# Patient Record
Sex: MALE | Race: WHITE | Employment: OTHER | URBAN - METROPOLITAN AREA
[De-identification: names, ages, dates, MRNs, and addresses within clinical notes are randomized per-mention and may not be internally consistent; named-entity substitution may affect disease eponyms.]

---

## 2017-02-08 ENCOUNTER — APPOINTMENT (OUTPATIENT)
Dept: ULTRASOUND IMAGING | Facility: CLINIC | Age: 82
End: 2017-02-08
Attending: PHYSICIAN ASSISTANT
Payer: MEDICARE

## 2017-02-08 ENCOUNTER — HOSPITAL ENCOUNTER (EMERGENCY)
Facility: CLINIC | Age: 82
Discharge: HOME | End: 2017-02-08
Attending: PHYSICIAN ASSISTANT | Admitting: PHYSICIAN ASSISTANT
Payer: MEDICARE

## 2017-02-08 ENCOUNTER — APPOINTMENT (OUTPATIENT)
Dept: GENERAL RADIOLOGY | Facility: CLINIC | Age: 82
End: 2017-02-08
Attending: PHYSICIAN ASSISTANT
Payer: MEDICARE

## 2017-02-08 VITALS
RESPIRATION RATE: 15 BRPM | TEMPERATURE: 97.6 F | BODY MASS INDEX: 21.66 KG/M2 | HEIGHT: 65 IN | SYSTOLIC BLOOD PRESSURE: 118 MMHG | DIASTOLIC BLOOD PRESSURE: 72 MMHG | WEIGHT: 130 LBS | OXYGEN SATURATION: 99 % | HEART RATE: 142 BPM

## 2017-02-08 DIAGNOSIS — I47.10 PAROXYSMAL SUPRAVENTRICULAR TACHYCARDIA (H): ICD-10-CM

## 2017-02-08 DIAGNOSIS — M25.473 ANKLE EDEMA: ICD-10-CM

## 2017-02-08 DIAGNOSIS — E87.6 HYPOKALEMIA: ICD-10-CM

## 2017-02-08 LAB
ANION GAP SERPL CALCULATED.3IONS-SCNC: 11 MMOL/L (ref 3–14)
BASOPHILS # BLD AUTO: 0 10E9/L (ref 0–0.2)
BASOPHILS NFR BLD AUTO: 0.2 %
BUN SERPL-MCNC: 37 MG/DL (ref 7–30)
CALCIUM SERPL-MCNC: 8.7 MG/DL (ref 8.5–10.1)
CHLORIDE SERPL-SCNC: 105 MMOL/L (ref 94–109)
CO2 SERPL-SCNC: 23 MMOL/L (ref 20–32)
CREAT SERPL-MCNC: 1.52 MG/DL (ref 0.66–1.25)
CRP SERPL-MCNC: 22.4 MG/L (ref 0–8)
DIFFERENTIAL METHOD BLD: ABNORMAL
EOSINOPHIL # BLD AUTO: 0.1 10E9/L (ref 0–0.7)
EOSINOPHIL NFR BLD AUTO: 1.2 %
ERYTHROCYTE [DISTWIDTH] IN BLOOD BY AUTOMATED COUNT: 14.2 % (ref 10–15)
ERYTHROCYTE [SEDIMENTATION RATE] IN BLOOD BY WESTERGREN METHOD: 26 MM/H (ref 0–20)
GFR SERPL CREATININE-BSD FRML MDRD: 43 ML/MIN/1.7M2
GLUCOSE SERPL-MCNC: 122 MG/DL (ref 70–99)
HCT VFR BLD AUTO: 38.6 % (ref 40–53)
HGB BLD-MCNC: 12.5 G/DL (ref 13.3–17.7)
IMM GRANULOCYTES # BLD: 0 10E9/L (ref 0–0.4)
IMM GRANULOCYTES NFR BLD: 0.2 %
INTERPRETATION ECG - MUSE: NORMAL
LYMPHOCYTES # BLD AUTO: 1.2 10E9/L (ref 0.8–5.3)
LYMPHOCYTES NFR BLD AUTO: 13.5 %
MAGNESIUM SERPL-MCNC: 2.2 MG/DL (ref 1.6–2.3)
MCH RBC QN AUTO: 31.4 PG (ref 26.5–33)
MCHC RBC AUTO-ENTMCNC: 32.4 G/DL (ref 31.5–36.5)
MCV RBC AUTO: 97 FL (ref 78–100)
MONOCYTES # BLD AUTO: 0.6 10E9/L (ref 0–1.3)
MONOCYTES NFR BLD AUTO: 6.9 %
NEUTROPHILS # BLD AUTO: 6.6 10E9/L (ref 1.6–8.3)
NEUTROPHILS NFR BLD AUTO: 78 %
NRBC # BLD AUTO: 0 10*3/UL
NRBC BLD AUTO-RTO: 0 /100
NT-PROBNP SERPL-MCNC: 843 PG/ML (ref 0–1800)
PLATELET # BLD AUTO: 217 10E9/L (ref 150–450)
POTASSIUM SERPL-SCNC: 3.1 MMOL/L (ref 3.4–5.3)
RBC # BLD AUTO: 3.98 10E12/L (ref 4.4–5.9)
SODIUM SERPL-SCNC: 139 MMOL/L (ref 133–144)
TROPONIN I SERPL-MCNC: NORMAL UG/L (ref 0–0.04)
WBC # BLD AUTO: 8.5 10E9/L (ref 4–11)

## 2017-02-08 PROCEDURE — 96374 THER/PROPH/DIAG INJ IV PUSH: CPT

## 2017-02-08 PROCEDURE — A9270 NON-COVERED ITEM OR SERVICE: HCPCS | Mod: GY | Performed by: PHYSICIAN ASSISTANT

## 2017-02-08 PROCEDURE — 71020 XR CHEST 2 VW: CPT

## 2017-02-08 PROCEDURE — 85025 COMPLETE CBC W/AUTO DIFF WBC: CPT | Performed by: PHYSICIAN ASSISTANT

## 2017-02-08 PROCEDURE — 25000132 ZZH RX MED GY IP 250 OP 250 PS 637: Mod: GY | Performed by: PHYSICIAN ASSISTANT

## 2017-02-08 PROCEDURE — 99285 EMERGENCY DEPT VISIT HI MDM: CPT | Mod: 25

## 2017-02-08 PROCEDURE — 73610 X-RAY EXAM OF ANKLE: CPT | Mod: RT

## 2017-02-08 PROCEDURE — 80048 BASIC METABOLIC PNL TOTAL CA: CPT | Performed by: PHYSICIAN ASSISTANT

## 2017-02-08 PROCEDURE — 83735 ASSAY OF MAGNESIUM: CPT | Performed by: PHYSICIAN ASSISTANT

## 2017-02-08 PROCEDURE — 92960 CARDIOVERSION ELECTRIC EXT: CPT

## 2017-02-08 PROCEDURE — 40000065 ZZH STATISTIC EKG NON-CHARGEABLE

## 2017-02-08 PROCEDURE — 84484 ASSAY OF TROPONIN QUANT: CPT | Performed by: PHYSICIAN ASSISTANT

## 2017-02-08 PROCEDURE — 83880 ASSAY OF NATRIURETIC PEPTIDE: CPT | Performed by: PHYSICIAN ASSISTANT

## 2017-02-08 PROCEDURE — 85652 RBC SED RATE AUTOMATED: CPT | Performed by: PHYSICIAN ASSISTANT

## 2017-02-08 PROCEDURE — 86140 C-REACTIVE PROTEIN: CPT | Performed by: PHYSICIAN ASSISTANT

## 2017-02-08 PROCEDURE — 25000128 H RX IP 250 OP 636: Performed by: PHYSICIAN ASSISTANT

## 2017-02-08 PROCEDURE — 93971 EXTREMITY STUDY: CPT | Mod: RT

## 2017-02-08 PROCEDURE — 93005 ELECTROCARDIOGRAM TRACING: CPT

## 2017-02-08 RX ORDER — POTASSIUM CHLORIDE 1500 MG/1
20 TABLET, EXTENDED RELEASE ORAL ONCE
Status: COMPLETED | OUTPATIENT
Start: 2017-02-08 | End: 2017-02-08

## 2017-02-08 RX ORDER — ADENOSINE 3 MG/ML
6 INJECTION, SOLUTION INTRAVENOUS ONCE
Status: COMPLETED | OUTPATIENT
Start: 2017-02-08 | End: 2017-02-08

## 2017-02-08 RX ORDER — LIDOCAINE 40 MG/G
CREAM TOPICAL
Status: DISCONTINUED | OUTPATIENT
Start: 2017-02-08 | End: 2017-02-08 | Stop reason: HOSPADM

## 2017-02-08 RX ADMIN — ADENOSINE 6 MG: 3 INJECTION, SOLUTION INTRAVENOUS at 12:39

## 2017-02-08 RX ADMIN — POTASSIUM CHLORIDE 20 MEQ: 1500 TABLET, EXTENDED RELEASE ORAL at 14:31

## 2017-02-08 ASSESSMENT — ENCOUNTER SYMPTOMS
FEVER: 0
SHORTNESS OF BREATH: 0
PALPITATIONS: 1

## 2017-02-08 NOTE — DISCHARGE INSTRUCTIONS
Rest, elevate, wear walking boot for comfort.   Follow up with primary care in 2-3 days for recheck.   Return sooner for worsening swelling, problems breathing, skin redness, or any other new concerns.

## 2017-02-08 NOTE — ED PROVIDER NOTES
I saw this patient in conjunction with Yohana Cabral PA-C      CHIEF COMPLAINT:  Ankle pain and palpitations.      HISTORY OF PRESENT ILLNESS:  Loco Hawkins states he has had months of right lower extremity pain and swelling and was noted to have a racing heart just prior to evaluation.  He states that this has been a longstanding intermittent issue.  He denies chest pain or shortness of breath.      PAST MEDICAL HISTORY:  Reviewed.      PHYSICAL EXAMINATION:   GENERAL:  He is alert, interactive, appears younger than his stated age.   Nurses note and vital signs were reviewed.   CARDIOVASCULAR:  Tachycardic and regular.   MUSCULOSKELETAL:  Demonstrates 1+ edema to the right lower extremity and tenderness over the bilateral malleoli of the right ankle.   SKIN:  Warm and dry, with no skin rash.      RESULTS REVIEWED:  Electrocardiograms x2 were reviewed.  Initial demonstrates SVT.  Following administration of adenosine, resolution of SVT.      EMERGENCY DEPARTMENT COURSE AND MEDICAL DECISION MAKING:  Following presentation, history and physical examination was performed.  The patient was noted to be in SVT with EKG changes, therefore received electrical cardioversion without incident via adenosine, subsequently breaking the SVT and returning into a normal sinus rhythm with a rate in the 70s and 80s.  Subsequently, workup was undertaken on his leg which returned as largely unremarkable.  The patient was placed in a surgical Cam boot and will follow up with Orthopedics.  There are no signs of DVT, septic arthritis, necrotizing soft tissue infection, fracture or more concerning illness.  The patient denies chest pain or shortness of breath and I doubt acute coronary syndrome or pulmonary embolism.  Laboratory workup is largely unremarkable and the EKG changes resolved after rate control was initiated.  The patient will follow up with his primary care provider and cardiologist and will return if new symptoms develop.       IMPRESSION:   1.  Supraventricular tachycardia.   2.  Right lower extremity edema and pain.   3.  Mild hypokalemia.      PLAN:  I agree with the plan of BLAISE Cabral.         AVIVA WHEELER MD             D: 2017 15:36   T: 2017 16:06   MT: GARRETT      Name:     CARLOS POLK   MRN:      [id number]        Account:      [id number]   :      10/31/1921           Visit Date:   2017      Document: [id number]

## 2017-02-08 NOTE — ED AVS SNAPSHOT
Emergency Department    [address] [place] [id number]    Phone:  [phone]    Fax:  [phone]                                       Loco Hawkins   MRN: [id number]    Department:   Emergency Department   Date of Visit:  2/8/2017           After Visit Summary Signature Page     I have received my discharge instructions, and my questions have been answered. I have discussed any challenges I see with this plan with the nurse or doctor.    ..........................................................................................................................................  Patient/Patient Representative Signature      ..........................................................................................................................................  Patient Representative Print Name and Relationship to Patient    ..................................................               ................................................  Date                                            Time    ..........................................................................................................................................  Reviewed by Signature/Title    ...................................................              ..............................................  Date                                                            Time

## 2017-02-08 NOTE — ED PROVIDER NOTES
History     Chief Complaint:  Ankle Pain      HPI   Loco Hawkins is a 89 year old male with a history of paroxysmal SVT who presents with ankle pain. The patient states that he has had intermittent right ankle and foot swelling since last summer. He states that he awoke this morning with severe pain on the medial aspect of his right ankle, and was unable to bear weight on it without pain. He denies any falls or trauma to the ankle. He normally ambulates with a walker. The patient denies calf pain, chest pain, shortness of breath, or fevers. He does report a racing heart beat, and states that he has been medicated for tachycardia in the past, but does not remember the name of this medication. This typically goes away, however today seems to be persistent since he left for the hospital this morning.     Cardiac Risk Factors   Sex: male    Tobacco: Positive - former smoker  Hypertension: Negative  Diabetes: Negative  Hyperlipidemia: Negative  Family History: Negative    PE/DVT Risk Factors   Personal History: Negative  Recent Travel: Positive former smoker  Recent Surgery/Hospitalization: Negative  Tobacco: Negative  Family History: Negative  Hormone Use: Negative   Cancer: Positive - prostate  Trauma: Negative      Allergies:  Penicillins     Medications:  Aspirin ec 81 mg enteric coated tablet   Diltiazem cd coated beads (cardizem cd) 180 mg 24 hour release capsule   Digoxin (aka lanoxin) 125 mcg tablet   Hydrochlorothiazide (aka hydrodiuril) 25 mg tablet   Latanoprost (xalatan) 0.005 % eye drop solution   carbidopa-levodopa (sinemet)  mg tablet   Dorzolamide-timolol (cosopt) 22.3-6.8 mg/ml eye drop solution        Past Medical History:  Primary open angle glaucoma of both eyes, mild stage    Paroxysmal SVT (supraventricular tachycardia) ([facility])   Age-related macular degeneration, dry, both eyes   Pseudophakia  Pain, heel   Parkinson's disease ([facility])   Glaucoma   Senile macular degeneration  Cancer  "[facility]  Personal hx of malignant neoplasm of prostate   Arthritis     Past Surgical history:  Cataract removal    Family History:  Cancer - Mother   Macular degeneration - Sister    Social History:  Marital Status:   Presents to the ED alone  Tobacco Use: Former smoker  Alcohol Use: rarely  PCP: Evan Mena      Review of Systems   Constitutional: Negative for fever.   Respiratory: Negative for shortness of breath.    Cardiovascular: Positive for palpitations. Negative for chest pain.   Musculoskeletal:        Positive for right ankle/foot pain and swelling. Negative for calf pain   All other systems reviewed and are negative.        Physical Exam     Patient Vitals for the past 24 hrs:   BP Temp Temp src Pulse Heart Rate Resp SpO2 Height Weight   02/08/17 1436 - 97.6  F (36.4  C) - - - - - - -   02/08/17 1434 118/72 mmHg - - - 77 15 99 % - -   02/08/17 1430 99/70 mmHg - - - 82  98 % - -   02/08/17 1415 142/59 mmHg - - - 86 10 99 % - -   02/08/17 1300 125/57 mmHg - - - 86 14 99 % - -   02/08/17 1245 117/69 mmHg - - - 130 12 - - -   02/08/17 1230 125/81 mmHg - - - 137 9 98 % - -   02/08/17 1205 103/74 mmHg 99.5  F (37.5  C) Temporal 142 - 16 99 % 1.651 m (5' 5\") 58.968 kg (130 lb)      Physical Exam    Nursing note and vitals reviewed.     GENERAL: Alert, mild distress, non toxic appearing.   HEENT: Normal conjunctiva. No scleral icterus. MMM.   NECK: Supple.  CARDIAC: Tachycardiac and regular rhythm. Normal heart sounds. No murmurs, rubs, or gallops appreciated.  PULMONARY: CTA bilaterally. Normal breath sounds. No wheezing, crackles, or rhonchi appreciated. No stridor. No accessory muscle usage. Speaking full sentences without difficulty.   ABDOMEN: Soft, non distended abdomen. Non-tender. No rebound or guarding.   NEURO: Alert and oriented. Non-focal.   MUSCULOSKELETAL: Edema to right mid shin extending to ankle. Mild tenderness about right ankle. Active DF/PF intact at right ankle, normal ROM of all toes. " No calf tenderness bilaterally.  SKIN: Skin is warm and dry. No rashes. No pallor or jaundice. No overlying erythema or warmth of right ankle.   PSYCH: Normal affect and mood.     Emergency Department Course   ECG:  @ 1213  Indication: tachycardia   Vent. Rate 135 bpm. IL interval * ms. QRS duration 90 ms. QT/QTc 310/465 ms. P-R-T axis * 55 62.   Supraventricular tachycardia. Marked St abnormality, possible inferior subendocardial injury.  Abnormal ECG.   Read at 1215    @ 1250  Vent. Rate 89 bpm. IL interval 230 ms. QRS duration 88 ms. QT/QTc 354/430 ms. P-R-T axis 74 56 57.   Sinus rhythm with 1st degree AV block. Otherwise normal ECG.  Resolution of SVT from previous  Read @ 1257     Imaging:  Ankle x-ray G/E 3 views, right:    A true lateral view of the ankle is not provided. Diffuse  soft tissue swelling around the ankle. Tiny faint linear lucency in  the soft tissues of the joint space adjacent to the medial dome of the  talus could be chronic and would have an unusual appearance for an  acute fracture. Correlate clinically for any trauma or focal symptoms.  Tiny avulsion fracture not excluded. Posterior and inferior calcaneal  spurs.  Report per radiology.      Chest x-ray PA and LAT:   Nipple shadow versus possible nodule at the right lateral  lung base. Recommend repeat PA chest radiograph with nipple markers.  No pleural effusions. Hyperinflation. Otherwise negative.   Report per radiology.     US lower extremity venous duplex right:  1. No evidence of deep vein thrombosis or greater saphenous vein  thrombosis in the right leg.  2. Right popliteal cyst.  Report per radiology.     Radiographic findings were communicated with the patient who voiced understanding of the findings.      Laboratory:  CBC:  WBC 8.5, HGB 12.5 (L),     BMP: Potassium 3.1 (L), Glucose 122 (H), BUN 17 (H), GFR estimate 43 (L), otherwise WNL (Creatinine 1.52 (H))     ESR: 26 (H)    CRP inflammation: 22.4 (H)    (1230) Troponin  "I: <0.015     Nt probnp inpatient (BNP): 843    Magnesium: 2.2    Procedures:      Cardioversion:   Performed by Dr. Cedillo and Yohana Cabral PA-C    Pre Procedure Rhythm:  SVT  Consent: Consent given by: Patient who states understanding of the procedure being performed after discussing the  risks, benefits and alternatives.  Time out: Immediately prior to procedure a \"time out\" was called to verify the correct patient, procedure, equipment, support staff and site/side marked as required.  Vital signs, airway and pulse oximetry were monitored and remained stable throughout the procedure and sedation was maintained until the procedure was complete.     Procedure: The patient was placed in the supine position. Adenosine 6 mg IV administered. The Patient tolerated the procedure well with no immediate complications. ECG post procedure revealed sinus rhythm with first degree AV block. Patient felt much improved and no longer complained of palpitations.        Interventions:  (1239) Adenosine, 6 mg, IV injection  (1431) Potassium Chloride, 20 mEq, PO     Emergency Department Course:  Nursing notes and vitals reviewed.  I performed an exam of the patient as documented above.   EKG was done, interpretation as above.  IV inserted.   Blood was drawn from the patient. This was sent for laboratory testing, findings above.   The patient was sent for an ankle and chest x-ray as well as a right lower extremity ultrasound while in the emergency department, findings above.    (9435) Findings and plan explained to the patient. Patient discharged home with instructions regarding supportive care, medications, and reasons to return. The importance of close follow-up was reviewed. I personally reviewed the laboratory results with the patient and answered all related questions prior to discharge.        Impression & Plan      Medical Decision Making:  This is a 89 year old male who presents with concern for right foot and ankle swelling " since this past summer that seems to be worsening over the past few days. He denies any radiation of pain into his calf. He has had no fevers. Ultrasound of the right lower extremity reveals no evidence of DVT. He does have a right popliteal cyst which I suspect is incidental and not related to his symptoms as this does not appear burst. He is neurovascularly intact in this extremity. Very low suspicion for CHF given BNP within normal limits and this seems to be unilateral in nature. CXR shows no signs of pleural effusions. He has had no recent trauma to the ankle. X-rays do reveal soft tissue swelling and possible faint linear lucency in the soft tissues of the joint space adjacent to the medial dome of the talus. This would be an unusual appearance for an acute fracture and again he has had no trauma to this area; thus I have low suspicion that this truly represents a fracture. Inflammatory markers are slightly elevated. I have very low suspicion for septic joint given no fevers, no overlying redness, active DF/PF intact and normal white count. Given this, I do not feel that joint aspiration is indicated. I suspect that this may be related to ongoing arthritis versus potential gout. His creatinine is slightly elevated at 1.5 which seems to be his baseline, however given this we will hold off on antiinflammatories and have him use Tylenol for pain. RICE treatment reviewed with the patient. We will also put him in a Cam walking boot as he felt comfortable with this in place and will provide some immobilization to help with the swelling.     Incidentally on arrival, his heart rate was noted to be quite tachycardiac at 130. EKG showed signs consistent with SVT. Upon review of his charts in care everywhere it appears that he has a history of paroxysmal SVT. He does take diltiazem at baseline but typically does not have issues with this as it seems to spontaneously resolve after 10 seconds. This persisted, he reported  palpitations associated with this but no chest pain or shortness of breath. Given this, adenosine 6 mg administered and he converted back to normal sinus rhythm. EKG prior to conversion showed some ST elevation in inferior leads which I suspect is related to his tachycardia. His troponin was unremarkable and these resolved after the conversion. See above procedure note for further details.Potassium slightly decreased at 3.1. This was orally replaced here. Magnesium within normal limits.     I advised him to follow up with his PCP to ensure improving in the next few days. Reviewed reasons to return to ED, including worsening symptoms, fevers, skin redness, chest pain, shortness of breath, or any new concerns. The patient was in agreement with plan and discharged in satisfactory condition with all questions answered.       Diagnosis:    ICD-10-CM    1. Ankle edema, right M25.473    2. Paroxysmal supraventricular tachycardia (H) I47.1    3. Hypokalemia, 3.1 E87.6        Disposition:  Discharge to home.       Dionisio MEZA, am serving as a scribe on 2/8/2017 at 1:38 PM to personally document services performed by Yohana Cabral PA-C based on my observations and the provider's statements to me.    2/8/2017    EMERGENCY DEPARTMENT        Yohana Cabral PA-C  02/08/17 0590

## 2017-02-08 NOTE — ED AVS SNAPSHOT
Emergency Department    [address] [facility] [address]    Phone:  [phone]    Fax:  [phone]                                       Loco Hawkins   MRN: [id number]    Department:   Emergency Department   Date of Visit:  2/8/2017           Patient Information     Date Of Birth          10/31/1921        Your diagnoses for this visit were:     Ankle edema, right     Paroxysmal supraventricular tachycardia (H)     Hypokalemia, 3.1        You were seen by Yohana Cabral PA-C.      Follow-up Information     Follow up with  Emergency Department.    Specialty:  EMERGENCY MEDICINE    Why:  If symptoms worsen    Contact information:    [address] [facility] [address] [phone]        Follow up with Baljeet Londono MD In 3 days.    Specialty:  Family Practice    Contact information:    [address] [address]   [address]  [facility] [address] [phone]          Discharge Instructions       Rest, elevate, wear walking boot for comfort.   Follow up with primary care in 2-3 days for recheck.   Return sooner for worsening swelling, problems breathing, skin redness, or any other new concerns.         24 Hour Appointment Hotline       To make an appointment at any [facility] clinic, call [phone] ([phone]). If you don't have a family doctor or clinic, we will help you find one. [facility] clinics are conveniently located to serve the needs of you and your family.             Review of your medicines      Notice     You have not been prescribed any medications.            Procedures and tests performed during your visit     Ankle XR, G/E 3 views, right    Basic metabolic panel    CBC with platelets differential    CRP inflammation    Chest XR,  PA & LAT    EKG 12-lead, tracing only    Erythrocyte sedimentation rate auto    Magnesium    Nt probnp inpatient    Troponin I (now)    US Lower Extremity Venous Duplex Right      Orders Needing Specimen  Collection     None      Pending Results     No orders found from 2/7/2017 to 2/9/2017.            Pending Culture Results     No orders found from 2/7/2017 to 2/9/2017.       Test Results from your hospital stay           2/8/2017 12:48 PM - Interface, Flexilab Results      Component Results     Component Value Ref Range & Units Status    WBC 8.5 4.0 - 11.0 10e9/L Final    RBC Count 3.98 (L) 4.4 - 5.9 10e12/L Final    Hemoglobin 12.5 (L) 13.3 - 17.7 g/dL Final    Hematocrit 38.6 (L) 40.0 - 53.0 % Final    MCV 97 78 - 100 fl Final    MCH 31.4 26.5 - 33.0 pg Final    MCHC 32.4 31.5 - 36.5 g/dL Final    RDW 14.2 10.0 - 15.0 % Final    Platelet Count 217 150 - 450 10e9/L Final    Diff Method Automated Method  Final    % Neutrophils 78.0 % Final    % Lymphocytes 13.5 % Final    % Monocytes 6.9 % Final    % Eosinophils 1.2 % Final    % Basophils 0.2 % Final    % Immature Granulocytes 0.2 % Final    Nucleated RBCs 0 0 /100 Final    Absolute Neutrophil 6.6 1.6 - 8.3 10e9/L Final    Absolute Lymphocytes 1.2 0.8 - 5.3 10e9/L Final    Absolute Monocytes 0.6 0.0 - 1.3 10e9/L Final    Absolute Eosinophils 0.1 0.0 - 0.7 10e9/L Final    Absolute Basophils 0.0 0.0 - 0.2 10e9/L Final    Abs Immature Granulocytes 0.0 0 - 0.4 10e9/L Final    Absolute Nucleated RBC 0.0  Final         2/8/2017  1:03 PM - Interface, Flexilab Results      Component Results     Component Value Ref Range & Units Status    Sodium 139 133 - 144 mmol/L Final    Potassium 3.1 (L) 3.4 - 5.3 mmol/L Final    Chloride 105 94 - 109 mmol/L Final    Carbon Dioxide 23 20 - 32 mmol/L Final    Anion Gap 11 3 - 14 mmol/L Final    Glucose 122 (H) 70 - 99 mg/dL Final    Urea Nitrogen 37 (H) 7 - 30 mg/dL Final    Creatinine 1.52 (H) 0.66 - 1.25 mg/dL Final    GFR Estimate 43 (L) >60 mL/min/1.7m2 Final    Non  GFR Calc    GFR Estimate If Black 52 (L) >60 mL/min/1.7m2 Final    African American GFR Calc    Calcium 8.7 8.5 - 10.1 mg/dL Final         2/8/2017   2:12 PM - Interface, Radiant Ib      Narrative     ULTRASOUND LOWER EXTREMITY VENOUS DUPLEX RIGHT  2/8/2017 1:56 PM     HISTORY: Right leg swelling.    FINDINGS: Duplex ultrasound with waveform spectral analysis and color  flow imaging was performed.    There is normal compressibility, phasicity, augmentation, and patency,  of the right common femoral, superficial femoral, popliteal, and  visualized posterior tibial and peroneal veins, as well as the greater  saphenous vein, with no thrombus seen.    There is a 2.5 x 1.7 x 0.6 cm right popliteal cyst.        Impression     IMPRESSION:  1. No evidence of deep vein thrombosis or greater saphenous vein  thrombosis in the right leg.  2. Right popliteal cyst.    BOBBY LAZARO MD         2/8/2017  1:02 PM - Interface, Flexilab Results      Component Results     Component Value Ref Range & Units Status    Sed Rate 26 (H) 0 - 20 mm/h Final         2/8/2017  1:03 PM - Interface, Flexilab Results      Component Results     Component Value Ref Range & Units Status    CRP Inflammation 22.4 (H) 0.0 - 8.0 mg/L Final         2/8/2017  1:40 PM - Interface, Radiant Ib      Narrative     XR ANKLE RT G/E 3 VW  2/8/2017 1:25 PM    HISTORY:  ankle swelling    COMPARISON:  None.        Impression     IMPRESSION:  A true lateral view of the ankle is not provided. Diffuse  soft tissue swelling around the ankle. Tiny faint linear lucency in  the soft tissues of the joint space adjacent to the medial dome of the  talus could be chronic and would have an unusual appearance for an  acute fracture. Correlate clinically for any trauma or focal symptoms.  Tiny avulsion fracture not excluded. Posterior and inferior calcaneal  spurs.    MATTHEW PASCUAL MD         2/8/2017  1:18 PM - Interface, Flexilab Results      Component Results     Component Value Ref Range & Units Status    Troponin I ES  0.000 - 0.045 ug/L Final    <0.015  The 99th percentile for upper reference range is 0.045 ug/L.  Troponin  values in   the range of 0.045 - 0.120 ug/L may be associated with risks of adverse   clinical events.           2/8/2017  1:42 PM - Interface, Radiant Ib      Narrative     CHEST TWO VIEWS  2/8/2017 1:25 PM    HISTORY:  Evaluate for effusions.    COMPARISON:  None.        Impression     IMPRESSION:  Nipple shadow versus possible nodule at the right lateral  lung base. Recommend repeat PA chest radiograph with nipple markers.  No pleural effusions. Hyperinflation. Otherwise negative.     MATTHEW PASCUAL MD         2/8/2017  1:27 PM - Interface, Flexilab Results      Component Results     Component Value Ref Range & Units Status    N-Terminal Pro BNP Inpatient 843 0 - 1800 pg/mL Final    Reference range shown and results flagged as abnormal are suggested inpatient   cut points for confirming diagnosis if CHF in an acute setting. Establishing   a   baseline value for each individual patient is useful for follow-up. An   inpatient or emergency department NT-proPBNP <300 pg/mL effectively rules out   acute CHF, with 99% negative predictive value.  The outpatient non-acute reference range for ruling out CHF is:   0-125 pg/mL (age 18 to less than 75)   0-450 pg/mL (age 75 yrs and older)           2/8/2017  1:48 PM - Interface, Flexilab Results      Component Results     Component Value Ref Range & Units Status    Magnesium 2.2 1.6 - 2.3 mg/dL Final                Clinical Quality Measure: Blood Pressure Screening     Your blood pressure was checked while you were in the emergency department today. The last reading we obtained was  BP: 118/72 mmHg . Please read the guidelines below about what these numbers mean and what you should do about them.  If your systolic blood pressure (the top number) is less than 120 and your diastolic blood pressure (the bottom number) is less than 80, then your blood pressure is normal. There is nothing more that you need to do about it.  If your systolic blood pressure (the top number) is  "120-139 or your diastolic blood pressure (the bottom number) is 80-89, your blood pressure may be higher than it should be. You should have your blood pressure rechecked within a year by a primary care provider.  If your systolic blood pressure (the top number) is 140 or greater or your diastolic blood pressure (the bottom number) is 90 or greater, you may have high blood pressure. High blood pressure is treatable, but if left untreated over time it can put you at risk for heart attack, stroke, or kidney failure. You should have your blood pressure rechecked by a primary care provider within the next 4 weeks.  If your provider in the emergency department today gave you specific instructions to follow-up with your doctor or provider even sooner than that, you should follow that instruction and not wait for up to 4 weeks for your follow-up visit.        Thank you for choosing [facility]       Thank you for choosing [facility] for your care. Our goal is always to provide you with excellent care. Hearing back from our patients is one way we can continue to improve our services. Please take a few minutes to complete the written survey that you may receive in the mail after you visit with us. Thank you!        [employer] Information     [employer] lets you send messages to your doctor, view your test results, renew your prescriptions, schedule appointments and more. To sign up, go to [email or web address] . Click on \"Log in\" on the left side of the screen, which will take you to the Welcome page. Then click on \"Sign up Now\" on the right side of the page.     You will be asked to enter the access code listed below, as well as some personal information. Please follow the directions to create your username and password.     Your access code is: [email or web address]  Expires: 2017  2:42 PM     Your access code will  in 90 days. If you need help or a new code, please call your [facility] clinic or [phone].        Care EveryWhere " ID     This is your Care EveryWhere ID. This could be used by other organizations to access your [facility] medical records  [id number]        After Visit Summary       This is your record. Keep this with you and show to your community pharmacist(s) and doctor(s) at your next visit.

## 2017-05-15 ENCOUNTER — HOSPITAL ENCOUNTER (EMERGENCY)
Facility: CLINIC | Age: 82
Discharge: HOME | End: 2017-05-15
Attending: EMERGENCY MEDICINE | Admitting: EMERGENCY MEDICINE
Payer: MEDICARE

## 2017-05-15 ENCOUNTER — APPOINTMENT (OUTPATIENT)
Dept: GENERAL RADIOLOGY | Facility: CLINIC | Age: 82
End: 2017-05-15
Attending: EMERGENCY MEDICINE
Payer: MEDICARE

## 2017-05-15 VITALS
BODY MASS INDEX: 23.3 KG/M2 | TEMPERATURE: 97.1 F | RESPIRATION RATE: 16 BRPM | SYSTOLIC BLOOD PRESSURE: 135 MMHG | DIASTOLIC BLOOD PRESSURE: 78 MMHG | WEIGHT: 140 LBS | OXYGEN SATURATION: 99 % | HEART RATE: 75 BPM

## 2017-05-15 DIAGNOSIS — K59.00 CONSTIPATION, UNSPECIFIED CONSTIPATION TYPE: ICD-10-CM

## 2017-05-15 PROCEDURE — 99283 EMERGENCY DEPT VISIT LOW MDM: CPT

## 2017-05-15 PROCEDURE — 74020 XR ABDOMEN 2 VW: CPT

## 2017-05-15 RX ORDER — ASPIRIN 81 MG
100 TABLET, DELAYED RELEASE (ENTERIC COATED) ORAL 2 TIMES DAILY
Qty: 60 TABLET | Refills: 0 | Status: SHIPPED | OUTPATIENT
Start: 2017-05-15 | End: 2017-12-17

## 2017-05-15 RX ORDER — SENNOSIDES A AND B 8.6 MG/1
1 TABLET, FILM COATED ORAL 2 TIMES DAILY PRN
Qty: 28 TABLET | Refills: 0 | Status: SHIPPED | OUTPATIENT
Start: 2017-05-15 | End: 2017-05-29

## 2017-05-15 ASSESSMENT — ENCOUNTER SYMPTOMS
NAUSEA: 0
FEVER: 0
VOMITING: 0
SHORTNESS OF BREATH: 0
HEMATURIA: 0
BLOOD IN STOOL: 0
DYSURIA: 0
CONSTIPATION: 1

## 2017-05-15 NOTE — ED PROVIDER NOTES
History     Chief Complaint:  Constipation     HPI   Loco Hawkins is a 89 year old male with history of atrial fibrillation anticoagulated on Aspirin who presents to the emergency department today for evaluation of constipation. Patient states that he has not had a BM is approximately five days. Last few BM's have required strain. Patient reports no associated abdominal pain. He also notes that this has been a reoccurring issue for home, approximately every 6 months. Patient has had normal fluid and PO intake. He reports normal colored urine and urine decrease. Patient has not taken medication for this, but has eaten more fiber recently to try and manage these. He also denies chest pain, shortness of breath, fever, nausea, vomiting, or blood in stool. Patient's wife is currently in the hospital having some tests done.     Allergies:  No Known Drug Allergies    Medications:    Aspirin  Digoxin  Diltiazem  Sinemet  Xalatan  HCTZ  Citracal     Past Medical History:    Unspecified glaucoma      Essential hypertension, benign      Personal history of malignant neoplasm of prostate      Other and unspecified hyperlipidemia      Esophageal reflux      Atrial fibrillation    Paralysis agitans      Unspecified transient cerebral ischemia      Unspecified hearing loss      Parkinson disease      Past Surgical History:    Rotator cuff surgery  Appendectomy  Prostatectomy    Family History:    History reviewed. No pertinent family history.     Social History:  The patient was accompanied to the ED by no one.  Smoking Status: Former smoker  Alcohol Use: Positive  Marital Status:   [2]     Review of Systems   Constitutional: Negative for fever.   Respiratory: Negative for shortness of breath.    Cardiovascular: Negative for chest pain.   Gastrointestinal: Positive for constipation. Negative for blood in stool, nausea and vomiting.   Genitourinary: Positive for decreased urine volume. Negative for dysuria and  hematuria.   All other systems reviewed and are negative.    Physical Exam   First Vitals:  BP: 149/68  Heart Rate: 77  Temp: 97.1  F (36.2  C)  Resp: 18  Weight: 63.5 kg (140 lb)  SpO2: 98 %      Physical Exam  General: Alert and cooperative with exam. Patient in no distress. Normal mentation.  Head:  Scalp is NC/AT  Eyes:  No scleral icterus, PERRL  ENT:  The external nose and ears are normal. The oropharynx is normal and without erythema; mucus membranes are dry. Uvula midline, no evidence of deep space infection.  Neck:  Normal range of motion without rigidity.  CV:  Regular rate and rhythm    No pathologic murmur   Resp:  Breath sounds are clear bilaterally    Non-labored, no retractions or accessory muscle use  GI:  Abdomen is soft, no distension, no tenderness. No peritoneal signs. Well healed surgical incisions.   Rectal exam: Amelia rectal tone, no evidence of fecal impaction.   MS:  No lower extremity edema   Skin:  Warm and dry, No rash or lesions noted.  Neuro: Oriented x 3. No gross motor deficits.    Emergency Department Course     Imaging:  Radiology findings were communicated with the patient who voiced understanding of the findings.    Abdomen xray 2 views:  Nonobstructive gas pattern.  Reading per radiology    Emergency Department Course:  Nursing notes and vitals reviewed.  I performed an exam of the patient as documented above.   The patient was sent for a abdomen xray while in the emergency department, results above.     At 1630 the patient was rechecked and we discussed imaging and plan of care.    At 1659 the patient was rechecked. Symptomatic care was discussed for home.    I personally reviewed the treatment plan with the Patient and answered all related questions prior to discharge.    Impression & Plan      Medical Decision Making:  Loco Hawkins is a 89 year old male who presents for evaluation for decreased stool output without signs of serious intraabdominal problems. Signs and  symptoms are consistent with constipation. There are no signs at this point for a need for rectal disimpaction.  There are no signs of obstruction nor other intraabdominal catastrophe. Abdomen xray obtained and without unremarkable findings. I am going to send them home with instructions on medication management of this (miralax, senna, docusate, increased fluids; mag citrate and/or fleets enema PRN).  Patient is agreeable with this and questions are answered.     Diagnosis:    ICD-10-CM    1. Constipation, unspecified constipation type K59.00      Disposition:   Discharged to home. Plan for follow up with PCP    Discharge Medications:  New Prescriptions    DOCUSATE SODIUM (COLACE) 100 MG TABLET    Take 100 mg by mouth 2 times daily    SENNA (SENOKOT) 8.6 MG TABLET    Take 1 tablet by mouth 2 times daily as needed for constipation     Scribe Disclosure:  I, Shade Dawn, am serving as a scribe at 3:05 PM on 5/15/2017 to document services personally performed by Danial Oakley DO, based on my observations and the provider's statements to me.   EMERGENCY DEPARTMENT       Danial Oakley DO  05/16/17 4081

## 2017-05-15 NOTE — DISCHARGE INSTRUCTIONS

## 2017-05-15 NOTE — ED AVS SNAPSHOT
Emergency Department    [address] [place] [address]    Phone:  [phone]    Fax:  [phone]                                       Loco Hawkins   MRN: [id number]    Department:   Emergency Department   Date of Visit:  5/15/2017           After Visit Summary Signature Page     I have received my discharge instructions, and my questions have been answered. I have discussed any challenges I see with this plan with the nurse or doctor.    ..........................................................................................................................................  Patient/Patient Representative Signature      ..........................................................................................................................................  Patient Representative Print Name and Relationship to Patient    ..................................................               ................................................  Date                                            Time    ..........................................................................................................................................  Reviewed by Signature/Title    ...................................................              ..............................................  Date                                                            Time

## 2017-05-15 NOTE — ED AVS SNAPSHOT
Emergency Department    [address] [facility] [address]    Phone:  [phone]    Fax:  [phone]                                       Loco Hawkins   MRN: [id number]    Department:   Emergency Department   Date of Visit:  5/15/2017           Patient Information     Date Of Birth          10/31/1921        Your diagnoses for this visit were:     Constipation, unspecified constipation type        You were seen by Danial Oakley DO.      Follow-up Information     Follow up with Baljeet Londono MD In 4 days.    Specialty:  Family Practice    Contact information:    [facility] [address]  [address]  [facility] [address] [phone]          Follow up with  Emergency Department.    Specialty:  EMERGENCY MEDICINE    Why:  If symptoms worsen    Contact information:    [address] [facility] [address] [phone]        Discharge Instructions         Constipation (Adult)  Constipation means that you have bowel movements that are less frequent than usual. Stools often become very hard and difficult to pass.  Constipation is very common. At some point in life it affects almost everyone. Since everyone's bowel habits are different, what is constipation to one person may not be to another. Your healthcare provider may do tests to diagnose constipation. It depends on what he or she finds when evaluating you.    Symptoms of constipation include:    Abdominal pain    Bloating    Vomiting    Painful bowel movements    Itching, swelling, bleeding, or pain around the anus  Causes  Constipation can have many causes. These include:    Diet low in fiber    Too much dairy    Not drinking enough liquids    Lack of exercise or physical activity. This is especially true for older adults.    Changes in lifestyle or daily routine, including pregnancy, aging, work, and travel    Frequent use or misuse of laxatives    Ignoring the urge to have a bowel movement or  delaying it until later    Medicines, such as certain prescription pain medicines, iron supplements, antacids, certain antidepressants, and calcium supplements    Diseases like irritable bowel syndrome, bowel obstructions, stroke, diabetes, thyroid disease, Parkinson disease, hemorrhoids, and colon cancer  Complications  Potential complications of constipation can include:    Hemorrhoids    Rectal bleeding from hemorrhoids or anal fissures (skin tears)    Hernias    Dependency on laxatives    Chronic constipation    Fecal impaction    Bowel obstruction or perforation  Home care  All treatment should be done after talking with your healthcare provider. This is especially true if you have another medical problems, are taking prescription medicines, or are an older adult. Treatment most often involves lifestyle changes. You may also need medicines. Your healthcare provider will tell you which will work best for you. Follow the advice below to help avoid this problem in the future.  Lifestyle changes  These lifestyle changes can help prevent constipation:    Diet. Eat a high-fiber diet, with fresh fruit and vegetables, and reduce dairy intake, meats, and processed foods    Fluids. It's important to get enough fluids each day. Drink plenty of water when you eat more fiber. If you are on diet that limits the amount of fluid you can have, talk about this with your healthcare provider.    Regular exercise. Check with your healthcare provider first.  Medications  Take any medicines as directed. Some laxatives are safe to use only every now and then. Others can be taken on a regular basis. Talk with your doctor or pharmacist if you have questions.  Prescription pain medicines can cause constipation. If you are taking this kind of medicine, ask your healthcare provider if you should also take a stool softener.  Medicines you may take to treat constipation include:    Fiber supplements    Stool  softeners    Laxatives    Enemas    Rectal suppositories  Follow-up care  Follow up with your healthcare provider if symptoms don't get better in the next few days. You may need to have more tests or see a specialist.  Call 911  Call 911 if any of these occur:    Trouble breathing    Stiff, rigid abdomen that is severely painful to touch    Confusion    Fainting or loss of consciousness    Rapid heart rate    Chest pain  When to seek medical advice  Call your healthcare provider right away if any of these occur:    Fever over 100.4 F (38 C)    Failure to resume normal bowel movements    Pain in your abdomen or back gets worse    Nausea or vomiting    Swelling in your abdomen    Blood in the stool    Black, tarry stool    Involuntary weight loss    Weakness    8848-2048 [employer]. [address] [place] [address], [place] [address]. All rights reserved. This information is not intended as a substitute for professional medical care. Always follow your healthcare professional's instructions.    Recommend Miralax daily.    May also try magnesium citrate and or Fleets enema as needed      24 Hour Appointment Hotline       To make an appointment at any [facility] clinic, call [phone] ([phone]). If you don't have a family doctor or clinic, we will help you find one. [facility] clinics are conveniently located to serve the needs of you and your family.             Review of your medicines      START taking        Dose / Directions Last dose taken    docusate sodium 100 MG tablet   Commonly known as:  COLACE   Dose:  100 mg   Quantity:  60 tablet        Take 100 mg by mouth 2 times daily   Refills:  0        senna 8.6 MG tablet   Commonly known as:  SENOKOT   Dose:  1 tablet   Quantity:  28 tablet        Take 1 tablet by mouth 2 times daily as needed for constipation   Refills:  0                Prescriptions were sent or printed at these locations (2 Prescriptions)                   Other Prescriptions                 Printed at Department/Unit printer (2 of 2)         docusate sodium (COLACE) 100 MG tablet               senna (SENOKOT) 8.6 MG tablet                Procedures and tests performed during your visit     XR Abdomen 2 Views      Orders Needing Specimen Collection     None      Pending Results     No orders found from 5/13/2017 to 5/16/2017.            Pending Culture Results     No orders found from 5/13/2017 to 5/16/2017.            Pending Results Instructions     If you had any lab results that were not finalized at the time of your Discharge, you can call the ED Lab Result RN at [phone]. You will be contacted by this team for any positive Lab results or changes in treatment. The nurses are available 7 days a week from 10A to 6:30P.  You can leave a message 24 hours per day and they will return your call.        Test Results From Your Hospital Stay        5/15/2017  4:33 PM      Narrative     ABDOMEN TWO VIEWS   5/15/2017 3:40 PM     HISTORY: No bowel movement for 5 days; rule out evidence of  small-bowel obstruction.      COMPARISON: None.    FINDINGS: There are surgical clips in the pelvis. Bowel gas pattern is  nonobstructive. No evidence of free air. Degenerative changes noted of  the spine.        Impression     IMPRESSION: Nonobstructive gas pattern.    THADDEUS SHANNON MD                Clinical Quality Measure: Blood Pressure Screening     Your blood pressure was checked while you were in the emergency department today. The last reading we obtained was  BP: 149/68 . Please read the guidelines below about what these numbers mean and what you should do about them.  If your systolic blood pressure (the top number) is less than 120 and your diastolic blood pressure (the bottom number) is less than 80, then your blood pressure is normal. There is nothing more that you need to do about it.  If your systolic blood pressure (the top number) is 120-139 or your diastolic blood pressure (the bottom  "number) is 80-89, your blood pressure may be higher than it should be. You should have your blood pressure rechecked within a year by a primary care provider.  If your systolic blood pressure (the top number) is 140 or greater or your diastolic blood pressure (the bottom number) is 90 or greater, you may have high blood pressure. High blood pressure is treatable, but if left untreated over time it can put you at risk for heart attack, stroke, or kidney failure. You should have your blood pressure rechecked by a primary care provider within the next 4 weeks.  If your provider in the emergency department today gave you specific instructions to follow-up with your doctor or provider even sooner than that, you should follow that instruction and not wait for up to 4 weeks for your follow-up visit.        Thank you for choosing [facility]       Thank you for choosing [facility] for your care. Our goal is always to provide you with excellent care. Hearing back from our patients is one way we can continue to improve our services. Please take a few minutes to complete the written survey that you may receive in the mail after you visit with us. Thank you!        [employer] Information     [employer] lets you send messages to your doctor, view your test results, renew your prescriptions, schedule appointments and more. To sign up, go to [email or web address] . Click on \"Log in\" on the left side of the screen, which will take you to the Welcome page. Then click on \"Sign up Now\" on the right side of the page.     You will be asked to enter the access code listed below, as well as some personal information. Please follow the directions to create your username and password.     Your access code is: [email or web address]  Expires: 2017  5:08 PM     Your access code will  in 90 days. If you need help or a new code, please call your [facility] clinic or [phone].        Care EveryWhere ID     This is your Care EveryWhere ID. This could " be used by other organizations to access your [facility] medical records  [id number]        After Visit Summary       This is your record. Keep this with you and show to your community pharmacist(s) and doctor(s) at your next visit.

## 2017-12-17 ENCOUNTER — HOSPITAL ENCOUNTER (INPATIENT)
Facility: CLINIC | Age: 82
LOS: 3 days | Discharge: NURSING, REHAB OR LONG-TERM CARE | DRG: 194 | End: 2017-12-21
Attending: EMERGENCY MEDICINE | Admitting: INTERNAL MEDICINE
Payer: MEDICARE

## 2017-12-17 ENCOUNTER — APPOINTMENT (OUTPATIENT)
Dept: GENERAL RADIOLOGY | Facility: CLINIC | Age: 82
DRG: 194 | End: 2017-12-17
Attending: EMERGENCY MEDICINE
Payer: MEDICARE

## 2017-12-17 DIAGNOSIS — M77.41 METATARSALGIA OF RIGHT FOOT: ICD-10-CM

## 2017-12-17 DIAGNOSIS — M62.81 GENERALIZED MUSCLE WEAKNESS: ICD-10-CM

## 2017-12-17 DIAGNOSIS — J10.1 INFLUENZA A: Primary | ICD-10-CM

## 2017-12-17 DIAGNOSIS — E87.6 HYPOKALEMIA: ICD-10-CM

## 2017-12-17 LAB
ALBUMIN SERPL-MCNC: 3.4 G/DL (ref 3.4–5)
ALBUMIN UR-MCNC: 10 MG/DL
ALP SERPL-CCNC: 46 U/L (ref 40–150)
ALT SERPL W P-5'-P-CCNC: 10 U/L (ref 0–70)
ANION GAP SERPL CALCULATED.3IONS-SCNC: 6 MMOL/L (ref 3–14)
APPEARANCE UR: CLEAR
AST SERPL W P-5'-P-CCNC: 46 U/L (ref 0–45)
BASOPHILS # BLD AUTO: 0 10E9/L (ref 0–0.2)
BASOPHILS NFR BLD AUTO: 0.2 %
BILIRUB SERPL-MCNC: 0.7 MG/DL (ref 0.2–1.3)
BILIRUB UR QL STRIP: NEGATIVE
BUN SERPL-MCNC: 47 MG/DL (ref 7–30)
CALCIUM SERPL-MCNC: 8.6 MG/DL (ref 8.5–10.1)
CHLORIDE SERPL-SCNC: 101 MMOL/L (ref 94–109)
CO2 SERPL-SCNC: 29 MMOL/L (ref 20–32)
COLOR UR AUTO: YELLOW
CREAT SERPL-MCNC: 1.48 MG/DL (ref 0.66–1.25)
DIFFERENTIAL METHOD BLD: ABNORMAL
EOSINOPHIL # BLD AUTO: 0.1 10E9/L (ref 0–0.7)
EOSINOPHIL NFR BLD AUTO: 0.9 %
ERYTHROCYTE [DISTWIDTH] IN BLOOD BY AUTOMATED COUNT: 14.3 % (ref 10–15)
FLUAV+FLUBV AG SPEC QL: NEGATIVE
FLUAV+FLUBV AG SPEC QL: POSITIVE
GFR SERPL CREATININE-BSD FRML MDRD: 44 ML/MIN/1.7M2
GLUCOSE SERPL-MCNC: 83 MG/DL (ref 70–99)
GLUCOSE UR STRIP-MCNC: NEGATIVE MG/DL
HCT VFR BLD AUTO: 38.2 % (ref 40–53)
HGB BLD-MCNC: 12.5 G/DL (ref 13.3–17.7)
HGB UR QL STRIP: NEGATIVE
IMM GRANULOCYTES # BLD: 0 10E9/L (ref 0–0.4)
IMM GRANULOCYTES NFR BLD: 0.2 %
INTERPRETATION ECG - MUSE: NORMAL
KETONES UR STRIP-MCNC: NEGATIVE MG/DL
LEUKOCYTE ESTERASE UR QL STRIP: NEGATIVE
LYMPHOCYTES # BLD AUTO: 0.9 10E9/L (ref 0.8–5.3)
LYMPHOCYTES NFR BLD AUTO: 13.1 %
MCH RBC QN AUTO: 31.3 PG (ref 26.5–33)
MCHC RBC AUTO-ENTMCNC: 32.7 G/DL (ref 31.5–36.5)
MCV RBC AUTO: 96 FL (ref 78–100)
MONOCYTES # BLD AUTO: 1.1 10E9/L (ref 0–1.3)
MONOCYTES NFR BLD AUTO: 17.5 %
MUCOUS THREADS #/AREA URNS LPF: PRESENT /LPF
NEUTROPHILS # BLD AUTO: 4.4 10E9/L (ref 1.6–8.3)
NEUTROPHILS NFR BLD AUTO: 68.1 %
NITRATE UR QL: NEGATIVE
NRBC # BLD AUTO: 0 10*3/UL
NRBC BLD AUTO-RTO: 0 /100
PH UR STRIP: 5.5 PH (ref 5–7)
PLATELET # BLD AUTO: 199 10E9/L (ref 150–450)
POTASSIUM SERPL-SCNC: 3.8 MMOL/L (ref 3.4–5.3)
PROT SERPL-MCNC: 8.2 G/DL (ref 6.8–8.8)
RBC # BLD AUTO: 4 10E12/L (ref 4.4–5.9)
RBC #/AREA URNS AUTO: 2 /HPF (ref 0–2)
SODIUM SERPL-SCNC: 136 MMOL/L (ref 133–144)
SOURCE: ABNORMAL
SP GR UR STRIP: 1.02 (ref 1–1.03)
SPECIMEN SOURCE: ABNORMAL
UROBILINOGEN UR STRIP-MCNC: NORMAL MG/DL (ref 0–2)
WBC # BLD AUTO: 6.5 10E9/L (ref 4–11)
WBC #/AREA URNS AUTO: <1 /HPF (ref 0–2)

## 2017-12-17 PROCEDURE — A9270 NON-COVERED ITEM OR SERVICE: HCPCS | Mod: GY | Performed by: INTERNAL MEDICINE

## 2017-12-17 PROCEDURE — 93005 ELECTROCARDIOGRAM TRACING: CPT

## 2017-12-17 PROCEDURE — 99285 EMERGENCY DEPT VISIT HI MDM: CPT | Mod: 25

## 2017-12-17 PROCEDURE — 87804 INFLUENZA ASSAY W/OPTIC: CPT | Performed by: EMERGENCY MEDICINE

## 2017-12-17 PROCEDURE — 85025 COMPLETE CBC W/AUTO DIFF WBC: CPT | Performed by: EMERGENCY MEDICINE

## 2017-12-17 PROCEDURE — 80053 COMPREHEN METABOLIC PANEL: CPT | Performed by: EMERGENCY MEDICINE

## 2017-12-17 PROCEDURE — 71020 XR CHEST 2 VW: CPT

## 2017-12-17 PROCEDURE — 81001 URINALYSIS AUTO W/SCOPE: CPT | Performed by: EMERGENCY MEDICINE

## 2017-12-17 PROCEDURE — 25000132 ZZH RX MED GY IP 250 OP 250 PS 637: Mod: GY | Performed by: EMERGENCY MEDICINE

## 2017-12-17 PROCEDURE — 99220 ZZC INITIAL OBSERVATION CARE,LEVL III: CPT | Performed by: INTERNAL MEDICINE

## 2017-12-17 PROCEDURE — A9270 NON-COVERED ITEM OR SERVICE: HCPCS | Mod: GY | Performed by: EMERGENCY MEDICINE

## 2017-12-17 PROCEDURE — G0378 HOSPITAL OBSERVATION PER HR: HCPCS

## 2017-12-17 PROCEDURE — 25000132 ZZH RX MED GY IP 250 OP 250 PS 637: Mod: GY | Performed by: INTERNAL MEDICINE

## 2017-12-17 RX ORDER — OSELTAMIVIR PHOSPHATE 30 MG/1
30 CAPSULE ORAL DAILY
Status: DISCONTINUED | OUTPATIENT
Start: 2017-12-18 | End: 2017-12-21 | Stop reason: HOSPADM

## 2017-12-17 RX ORDER — CARBIDOPA AND LEVODOPA 25; 100 MG/1; MG/1
2 TABLET ORAL 3 TIMES DAILY
COMMUNITY

## 2017-12-17 RX ORDER — ONDANSETRON 4 MG/1
4 TABLET, ORALLY DISINTEGRATING ORAL EVERY 6 HOURS PRN
Status: DISCONTINUED | OUTPATIENT
Start: 2017-12-17 | End: 2017-12-21 | Stop reason: HOSPADM

## 2017-12-17 RX ORDER — MULTIPLE VITAMINS W/ MINERALS TAB 9MG-400MCG
1 TAB ORAL DAILY
COMMUNITY

## 2017-12-17 RX ORDER — AMOXICILLIN 250 MG
1 CAPSULE ORAL 2 TIMES DAILY PRN
Status: DISCONTINUED | OUTPATIENT
Start: 2017-12-17 | End: 2017-12-21 | Stop reason: HOSPADM

## 2017-12-17 RX ORDER — ONDANSETRON 2 MG/ML
4 INJECTION INTRAMUSCULAR; INTRAVENOUS EVERY 6 HOURS PRN
Status: DISCONTINUED | OUTPATIENT
Start: 2017-12-17 | End: 2017-12-21 | Stop reason: HOSPADM

## 2017-12-17 RX ORDER — DOCUSATE SODIUM 100 MG/1
100 CAPSULE, LIQUID FILLED ORAL 2 TIMES DAILY
Status: DISCONTINUED | OUTPATIENT
Start: 2017-12-17 | End: 2017-12-21 | Stop reason: HOSPADM

## 2017-12-17 RX ORDER — HYDROCHLOROTHIAZIDE 25 MG/1
25 TABLET ORAL DAILY
Status: DISCONTINUED | OUTPATIENT
Start: 2017-12-18 | End: 2017-12-19

## 2017-12-17 RX ORDER — NALOXONE HYDROCHLORIDE 0.4 MG/ML
.1-.4 INJECTION, SOLUTION INTRAMUSCULAR; INTRAVENOUS; SUBCUTANEOUS
Status: DISCONTINUED | OUTPATIENT
Start: 2017-12-17 | End: 2017-12-21 | Stop reason: HOSPADM

## 2017-12-17 RX ORDER — ASPIRIN 81 MG/1
81 TABLET ORAL DAILY
Status: DISCONTINUED | OUTPATIENT
Start: 2017-12-18 | End: 2017-12-21 | Stop reason: HOSPADM

## 2017-12-17 RX ORDER — DIGOXIN 125 MCG
125 TABLET ORAL
Status: DISCONTINUED | OUTPATIENT
Start: 2017-12-18 | End: 2017-12-21 | Stop reason: HOSPADM

## 2017-12-17 RX ORDER — DORZOLAMIDE HYDROCHLORIDE AND TIMOLOL MALEATE 20; 5 MG/ML; MG/ML
1 SOLUTION/ DROPS OPHTHALMIC 2 TIMES DAILY
COMMUNITY

## 2017-12-17 RX ORDER — DILTIAZEM HYDROCHLORIDE 180 MG/1
180 CAPSULE, COATED, EXTENDED RELEASE ORAL DAILY
Status: ON HOLD | COMMUNITY
End: 2019-01-29

## 2017-12-17 RX ORDER — ACETAMINOPHEN 325 MG/1
650 TABLET ORAL EVERY 4 HOURS PRN
Status: DISCONTINUED | OUTPATIENT
Start: 2017-12-17 | End: 2017-12-21 | Stop reason: HOSPADM

## 2017-12-17 RX ORDER — LATANOPROST 50 UG/ML
1 SOLUTION/ DROPS OPHTHALMIC AT BEDTIME
Status: DISCONTINUED | OUTPATIENT
Start: 2017-12-17 | End: 2017-12-21 | Stop reason: HOSPADM

## 2017-12-17 RX ORDER — CARBIDOPA AND LEVODOPA 25; 100 MG/1; MG/1
2 TABLET ORAL 3 TIMES DAILY
Status: DISCONTINUED | OUTPATIENT
Start: 2017-12-17 | End: 2017-12-21 | Stop reason: HOSPADM

## 2017-12-17 RX ORDER — AMOXICILLIN 250 MG
2 CAPSULE ORAL 2 TIMES DAILY PRN
Status: DISCONTINUED | OUTPATIENT
Start: 2017-12-17 | End: 2017-12-21 | Stop reason: HOSPADM

## 2017-12-17 RX ORDER — ACETAMINOPHEN 650 MG/1
650 SUPPOSITORY RECTAL EVERY 4 HOURS PRN
Status: DISCONTINUED | OUTPATIENT
Start: 2017-12-17 | End: 2017-12-21 | Stop reason: HOSPADM

## 2017-12-17 RX ORDER — ASPIRIN 81 MG/1
81 TABLET ORAL DAILY
COMMUNITY

## 2017-12-17 RX ORDER — LATANOPROST 50 UG/ML
1 SOLUTION/ DROPS OPHTHALMIC AT BEDTIME
COMMUNITY

## 2017-12-17 RX ORDER — DIGOXIN 125 MCG
125 TABLET ORAL
Status: ON HOLD | COMMUNITY
End: 2019-01-29

## 2017-12-17 RX ORDER — ASPIRIN 81 MG
100 TABLET, DELAYED RELEASE (ENTERIC COATED) ORAL 2 TIMES DAILY
COMMUNITY

## 2017-12-17 RX ORDER — DILTIAZEM HYDROCHLORIDE 180 MG/1
180 CAPSULE, EXTENDED RELEASE ORAL DAILY
Status: DISCONTINUED | OUTPATIENT
Start: 2017-12-18 | End: 2017-12-21 | Stop reason: HOSPADM

## 2017-12-17 RX ORDER — OSELTAMIVIR PHOSPHATE 30 MG/1
30 CAPSULE ORAL ONCE
Status: COMPLETED | OUTPATIENT
Start: 2017-12-17 | End: 2017-12-17

## 2017-12-17 RX ADMIN — OSELTAMIVIR PHOSPHATE 30 MG: 30 CAPSULE ORAL at 16:13

## 2017-12-17 RX ADMIN — CARBIDOPA AND LEVODOPA 2 TABLET: 25; 100 TABLET ORAL at 21:37

## 2017-12-17 RX ADMIN — DOCUSATE SODIUM 100 MG: 100 CAPSULE, LIQUID FILLED ORAL at 21:37

## 2017-12-17 RX ADMIN — VITAMIN D, TAB 1000IU (100/BT) 1000 UNITS: 25 TAB at 21:37

## 2017-12-17 ASSESSMENT — ENCOUNTER SYMPTOMS
VOMITING: 0
WEAKNESS: 1
RHINORRHEA: 1
NAUSEA: 0
FATIGUE: 1
COUGH: 1
CHILLS: 0
SORE THROAT: 0
DIARRHEA: 0
HEADACHES: 0
SHORTNESS OF BREATH: 0
MYALGIAS: 1
ABDOMINAL PAIN: 0
CONSTIPATION: 0
NUMBNESS: 0
DIAPHORESIS: 0
FEVER: 0

## 2017-12-17 ASSESSMENT — ACTIVITIES OF DAILY LIVING (ADL)
TRANSFERRING: 1-->ASSISTIVE EQUIPMENT
FALL_HISTORY_WITHIN_LAST_SIX_MONTHS: NO
DRESS: 2-->ASSISTIVE PERSON
AMBULATION: 1-->ASSISTIVE EQUIPMENT
COGNITION: 1 - ATTENTION OR MEMORY DEFICITS
BATHING: 2-->ASSISTIVE PERSON
TOILETING: 1-->ASSISTIVE EQUIPMENT
RETIRED_COMMUNICATION: 0-->UNDERSTANDS/COMMUNICATES WITHOUT DIFFICULTY
RETIRED_EATING: 0-->INDEPENDENT
SWALLOWING: 0-->SWALLOWS FOODS/LIQUIDS WITHOUT DIFFICULTY

## 2017-12-17 NOTE — ED PROVIDER NOTES
History     Chief Complaint:  Generalized weakness    HPI   History is limited due to the condition of the patient. History is provided by the patient's nephew.     Loco Hawkins is a 89 year old male who presents with his nephew to the ED for evaluation of generalized weakness. The patient's nephew reports that his wife is recovering from a recent heart valve replacement 4 weeks ago and thus the patient has been caring for himself since then. He does live in an assisted living facility where there are health care aides available at all times, however, there are no nurses this weekend. A couple days ago he began to develop a cold that his wife previously had. Today, his nephew reports that he was unable to stand up with his walker at all whereas, typically he is able to do this with slight difficulty. The patient does note that the back of his left leg has become increasingly painful while transferring from his chair. He denies any nausea, vomiting, diarrhea, constipation, abdominal pain, recent fall/trauma, headache, urinary problems, or any other symptoms.    Allergies:  No known drug allergies    Medications:    Colace     Past Medical History:    Dementia  Parkinson's disease  iamSPRAIN rotator cuff    Past Surgical History:    History reviewed. No pertinent surgical history.    Family History:    History reviewed. No pertinent family history.     Social History:  Smoking status: Unknown  Alcohol use: No  Marital Status:       Review of Systems   Constitutional: Positive for fatigue. Negative for chills, diaphoresis and fever.   HENT: Positive for congestion and rhinorrhea. Negative for sore throat.    Respiratory: Positive for cough. Negative for shortness of breath.    Gastrointestinal: Negative for abdominal pain, constipation, diarrhea, nausea and vomiting.   Genitourinary: Negative.    Musculoskeletal: Positive for myalgias (left hamstring).   Neurological: Positive for weakness. Negative for  "numbness and headaches.   All other systems reviewed and are negative.    Physical Exam   Patient Vitals for the past 24 hrs:   BP Temp Temp src Heart Rate Resp SpO2 Height Weight   12/17/17 1530 136/59 - - - - - - -   12/17/17 1500 144/61 - - - - - - -   12/17/17 1242 131/51 98.1  F (36.7  C) Oral 68 14 99 % 1.702 m (5' 7\") 61.2 kg (135 lb)     Physical Exam  GENERAL: well developed, pleasant  HEAD: atraumatic  EYES: pupils reactive, extraocular muscles intact, conjunctivae normal  ENT:  mucus membranes moist  NECK:  trachea midline, normal range of motion  RESPIRATORY: no tachypnea, breath sounds clear to auscultation   CVS: normal S1/S2, no murmurs, intact distal pulses  ABDOMEN: soft, nontender, nondistention  MUSCULOSKELETAL: no deformities  SKIN: warm and dry, no acute rashes or ulceration  NEURO: GCS 15, cranial nerves intact, alert and oriented x3  PSYCH:  Mood/affect normal    Emergency Department Course   ECG (14:51:13):  Rate 55 bpm. WI interval 172. QRS duration 82. QT/QTc 410/392. P-R-T axes -13 51 58. Sinus bradycardia. Low voltage QRS. Borderline ECG. Interpreted at 1700 by Declan Robles MD.    Imaging:  Radiographic findings were communicated with the patient and family who voiced understanding of the findings.    X-ray Chest, 2 views:  IMPRESSION: Negative.  Result per radiology.     Laboratory:  CBC: HGB 12.5 (L) o/w WNL (WBC 6.5, )   CMP: BUN 47 (H), Creatinine 1.48 (H), GFR 44 (L), AST 46 (H) o/w WNL   Influenza A/B antigen: Positive for Influenza A    Interventions:  1613: Tamiflu 30 mg capsule PO    Emergency Department Course:  Past medical records, nursing notes, and vitals reviewed.  1410: I performed an exam of the patient and obtained history, as documented above. GCS 15.    IV inserted and blood drawn.    The patient was sent for a x-ray while in the emergency department, findings above.    Findings and plan explained to the Patient and nephew who consents to admission.     1520: " Discussed the patient with Dr. Hall, who will admit the patient to a observation bed for further monitoring, evaluation, and treatment.     Impression & Plan      Medical Decision Making:  Loco Hawkins is a 89 year old male who presents with increased weakness with recent exposure to someone with a cough. He is now unable to ambulate due to the weakness. Broad differential is considered. Patient has Influenza A and is given Tamiflu, dose corrected for his age and Creatinine clearance. I spoke with the hospitalist regarding admission.    Diagnosis:    ICD-10-CM    1. Influenza A J10.1 Comprehensive metabolic panel   2. Generalized muscle weakness M62.81        Disposition:  Admitted to Dr. Hall.      Clarissa Guzman  12/17/2017    EMERGENCY DEPARTMENT  I, Clarissa Guzman, am serving as a scribe at 2:10 PM on 12/17/2017 to document services personally performed by Declan Robles MD based on my observations and the provider's statements to me.        Declan Robles MD  12/21/17 0141

## 2017-12-17 NOTE — H&P
[facility]    History and Physical  Hospitalist       Date of Admission:  12/17/2017    Assessment & Plan   Loco Hawkins is a 89 year old male with a history of Parkinson's Disease and dementia who presented with generalized weakness the past 2-3 days and was found to be influenza A positive    Influenza A  Patient was influenza A positive in the ED.  This is the likely cause of the patient's acute symptoms including his generalized weakness and cough.  His vitals were normal in the ED and he was satting well on room air.  A CXR did not show any signs of infiltrate  - Admission under obs status  - Tamiflu 30 mg BID given CrCl  - Tylenol PRN   - PT/OT/Social work consults to help with placement    Parkinson's Disease  Dementia  Patient has no formal diagnosis for dementia or more specifically Lewy Body Dementia.  - Will continue PTA Sinemet     Paroxysmal SVT   [facility] (notes in Epic).  Well controlled on Digoxin and Diltiazem.  Currently in NSR on telemetry and EKG from today showed sinus bradycardia.   - Will continue Digoxin and Diltiazem     HTN   Well controlled at this time  - Continue PTA HCTZ     DVT Prophylaxis: Pneumatic Compression Devices  Code Status: Unknown, patient has advanced directives per his nephew but he does not know what they are.  He will try to bring the paperwork in later today.     Disposition: Expected discharge in 1-2 days once evaluated by PT/OT and possible escalation in outpatient cares can be arranged.    Juan Luis Hall DO    Primary Care Physician   Baljeet Londono    Chief Complaint   Generalized weakness    History is obtained from the patient and his nephew.  His history is somewhat limited given his dementia    History of Present Illness   Loco Hawkins is a 89 year old male with a history of parkinsons disease who presents with generalized weakness for the past 2-3 days.  Patient has also had a non-productive  cough, subjective shortness of breath, chills and body aches, most prominent in left leg).  He has not had any reported fevers, chest pain, headaches, sore throat, leg swelling.  Of note, the patient's wife has been ill the past couple of weeks with URI symptoms.  It is unknown if the patient received his influenza vaccination this year.     In the ED the patient was found to be influenza A positive and was started on Tamiflu.     Past Medical History    I have reviewed this patient's medical history and updated it with pertinent information if needed.   Past Medical History:   Diagnosis Date     Dementia      Parkinson's disease (H)    Paroxysmal SVT  HTN     Past Surgical History   I have reviewed this patient's surgical history and updated it with pertinent information if needed.  Unable to obtain due to dementia    Prior to Admission Medications   Prior to Admission Medications   Prescriptions Last Dose Informant Patient Reported? Taking?   ASPIRIN EC PO 12/17/2017 at am  Yes Yes   Sig: Take 81 mg by mouth daily   DIGOXIN PO 12/15/2017  Yes Yes   Sig: Take 125 mcg by mouth five times a week Monday-Friday   HYDROCHLOROTHIAZIDE PO 12/17/2017 at am  Yes Yes   Sig: Take 25 mg by mouth daily   carbidopa-levodopa (SINEMET)  MG per tablet   Yes Yes   Sig: Take 2 tablets by mouth   diltiazem (CARTIA XT) 180 MG 24 hr capsule 12/17/2017 at am  Yes Yes   Sig: Take 180 mg by mouth daily   dorzolamide-timolol (COSOPT) 2-0.5 % ophthalmic solution 12/17/2017 at am x 1 dose  Yes Yes   Sig: Place 1 drop into both eyes 2 times daily   latanoprost (XALATAN) 0.005 % ophthalmic solution 12/16/2017 at pm  Yes Yes   Sig: Place 1 drop into both eyes At Bedtime   multivitamin, therapeutic with minerals (MULTI-VITAMIN) TABS tablet 12/17/2017 at am  Yes Yes   Sig: Take 1 tablet by mouth daily      Facility-Administered Medications: None     Allergies   No Known Allergies    Social History   I have reviewed this patient's social  history and updated it with pertinent information if needed. He has never used smokeless tobacco. He does not drink alcohol or use illicit drugs.  Currently lives in an assisted living with his wife facility but does not have weekend care.    Family History   I have reviewed this patient's family history and updated it with pertinent information if needed.   Unable to obtain due to dementia     Review of Systems   The 10 point Review of Systems is negative other than noted in the HPI    Physical Exam   Temp: 98.1  F (36.7  C) Temp src: Oral BP: 144/61   Heart Rate: 68 Resp: 14 SpO2: 99 % O2 Device: None (Room air)    Vital Signs with Ranges  Temp:  [98.1  F (36.7  C)] 98.1  F (36.7  C)  Heart Rate:  [68] 68  Resp:  [14] 14  BP: (131-144)/(51-61) 144/61  SpO2:  [99 %] 99 %  135 lbs 0 oz    Constitutional: Awake, alert, cooperative, no apparent distress.  Eyes: Conjunctiva and pupils examined and normal.  HEENT: Moist mucous membranes, normal dentition.  Respiratory: Poor respiratory effort but clear to auscultation bilaterally, no crackles or wheezing.  Cardiovascular: Regular rate and rhythm, normal S1 and S2, and no murmur noted.  GI: Soft, non-distended, non-tender, normal bowel sounds.  Lymph/Hematologic: No abnormal bruising  Skin: No rashes, no cyanosis, no edema.  Musculoskeletal: No joint swelling, erythema or tenderness.  Neurologic: 4/5 strength to all 4 extremities   Psychiatric: Alert, no obvious anxiety or depression.    Data   Data reviewed today:  I personally reviewed   CXR:  No obvious infiltrates.  No cardiomegaly.  No pleural effusions  EKG:  Sinus bradycardia.  Artifact present.  No obvious ST elevations.    Recent Labs  Lab 12/17/17  1420   WBC 6.5   HGB 12.5*   MCV 96         POTASSIUM 3.8   CHLORIDE 101   CO2 29   BUN 47*   CR 1.48*   ANIONGAP 6   DON 8.6   GLC 83   ALBUMIN 3.4   PROTTOTAL 8.2   BILITOTAL 0.7   ALKPHOS 46   ALT 10   AST 46*       Imaging:  Recent Results (from the  past 24 hour(s))   XR Chest 2 Views    Narrative    CHEST TWO VIEWS   12/17/2017 2:43 PM     HISTORY: Shortness of breath.     COMPARISON: 2/8/2017.    FINDINGS: Negative chest. No interval change.      Impression    IMPRESSION: Negative.

## 2017-12-17 NOTE — IP AVS SNAPSHOT
` `     [place] [address] [facility]: [phone]            Medication Administration Report for oLco Hawkins as of 12/20/17 1321   Legend:    Given Hold Not Given Due Canceled Entry Other Actions    Time Time (Time) Time  Time-Action       Inactive    Active    Linked        Medications 12/14/17 12/15/17 12/16/17 12/17/17 12/18/17 12/19/17 12/20/17    acetaminophen (TYLENOL) Suppository 650 mg  Dose: 650 mg Freq: EVERY 4 HOURS PRN Route: RE  PRN Reason: mild pain  Start: 12/17/17 1852   Admin Instructions: Alternate ibuprofen (if ordered) with acetaminophen.  Maximum acetaminophen dose from all sources = 75 mg/kg/day not to exceed 4 grams/day.               acetaminophen (TYLENOL) tablet 650 mg  Dose: 650 mg Freq: EVERY 4 HOURS PRN Route: PO  PRN Reason: mild pain  Start: 12/17/17 1852   Admin Instructions: Alternate ibuprofen (if ordered) with acetaminophen.  Maximum acetaminophen dose from all sources = 75 mg/kg/day not to exceed 4 grams/day.         0358 (650 mg)-Given       1111 (650 mg)-Given       1604 (650 mg)-Given       2118 (650 mg)-Given        0156 (650 mg)-Given [C]       1028 (650 mg)-Given            aspirin EC EC tablet 81 mg  Dose: 81 mg Freq: DAILY Route: PO  Start: 12/18/17 0900        0839 (81 mg)-Given        1012 (81 mg)-Given        0851 (81 mg)-Given           carbidopa-levodopa (SINEMET)  MG per tablet 2 tablet  Dose: 2 tablet Freq: 3 TIMES DAILY Route: PO  Start: 12/17/17 2200       2137 (2 tablet)-Given        0839 (2 tablet)-Given       1604 (2 tablet)-Given       2117 (2 tablet)-Given        1011 (2 tablet)-Given       1709 (2 tablet)-Given       2127 (2 tablet)-Given        0850 (2 tablet)-Given       [ ] 1600       [ ] 2200           cholecalciferol (vitamin D3) tablet 1,000 Units  Dose: 1,000 Units Freq: 2 TIMES DAILY Route: PO  Start: 12/17/17 2100       2137 (1,000 Units)-Given        0839 (1,000 Units)-Given       2117 (1,000 Units)-Given        1011  (1,000 Units)-Given       2127 (1,000 Units)-Given        0851 (1,000 Units)-Given       [ ] 2100           digoxin (LANOXIN) tablet 125 mcg  Dose: 125 mcg Freq: Once per day on Mon Tue Wed Thu Fri Route: PO  Start: 12/18/17 0900        1111 (125 mcg)-Given        1012 (125 mcg)-Given        0854 (125 mcg)-Given           diltiazem (DILACOR XR) 24 hr capsule 180 mg  Dose: 180 mg Freq: DAILY Route: PO  Start: 12/18/17 0900   Admin Instructions: DO NOT CRUSH.    Hold if SBP < 110, DBP < 40         (0946)-Not Given        (0830)-Not Given        0854 (180 mg)-Given           docusate sodium (COLACE) capsule 100 mg  Dose: 100 mg Freq: 2 TIMES DAILY Route: PO  Start: 12/17/17 2100       2137 (100 mg)-Given        0839 (100 mg)-Given       (2307)-Not Given [C]        1011 (100 mg)-Given       2127 (100 mg)-Given        0851 (100 mg)-Given       [ ] 2100           latanoprost (XALATAN) 0.005 % ophthalmic solution 1 drop  Dose: 1 drop Freq: AT BEDTIME Route: Both Eyes  Start: 12/17/17 2200   Admin Instructions: Refrigerated product.        (2221)-Not Given        2117 (1 drop)-Given        2255 (1 drop)-Given        [ ] 2200           naloxone (NARCAN) injection 0.1-0.4 mg  Dose: 0.1-0.4 mg Freq: EVERY 2 MIN PRN Route: IV  PRN Reason: opioid reversal  Start: 12/17/17 1852   Admin Instructions: For respiratory rate LESS than or EQUAL to 8.  Partial reversal dose:  0.1 mg titrated q 2 minutes for Analgesia Side Effects Monitoring Sedation Level of 3 (frequently drowsy, arousable, drifts to sleep during conversation).Full reversal dose:  0.4 mg bolus for Analgesia Side Effects Monitoring Sedation Level of 4 (somnolent, minimal or no response to stimulation).  For ordered doses up to 2mg give IVP. Give each 0.4mg over 15 seconds in emergency situations. For non-emergent situations further dilute in 9mL of NS to facilitate titration of response.               ondansetron (ZOFRAN-ODT) ODT tab 4 mg  Dose: 4 mg Freq: EVERY 6 HOURS  PRN Route: PO  PRN Reasons: nausea,vomiting  Start: 12/17/17 1852   Admin Instructions: This is Step 1 of nausea and vomiting management.  If nausea not resolved in 15 minutes, go to Step 2 prochlorperazine (COMPAZINE). Do not push through foil backing. Peel back foil and gently remove. Place on tongue immediately. Administration with liquid unnecessary              Or  ondansetron (ZOFRAN) injection 4 mg  Dose: 4 mg Freq: EVERY 6 HOURS PRN Route: IV  PRN Reasons: nausea,vomiting  Start: 12/17/17 1852   Admin Instructions: This is Step 1 of nausea and vomiting management.  If nausea not resolved in 15 minutes, go to Step 2 prochlorperazine (COMPAZINE).  Irritant. For ordered doses up to 4 mg, give IV Push undiluted over 2-5 minutes.               oseltamivir (TAMIFLU) capsule 30 mg  Dose: 30 mg Freq: DAILY Route: PO  Indications of Use: INFLUENZA  Start: 12/18/17 0900   Admin Instructions: Dose adjusted for CRCL = 25.3ml/min         0839 (30 mg)-Given        1011 (30 mg)-Given        0851 (30 mg)-Given           potassium chloride (KLOR-CON) Packet 20-40 mEq  Dose: 20-40 mEq Freq: EVERY 2 HOURS PRN Route: ORAL OR FEED  PRN Reason: potassium supplementation  Start: 12/19/17 0946   Admin Instructions: Use if unable to tolerate tablets.  If Serum K+ 3.0-3.3, dose = 60 mEq po total dose (40 mEq x1 followed in 2 hours by 20 mEq x1). Recheck K+ level 4 hours after dose and the next AM.  If Serum K+ 2.5-2.9, dose = 80 mEq po total dose (40 mEq Q2H x2). Recheck K+ level 4 hours after dose and the next AM.  If Serum K+ less than 2.5, See IV order.  Dissolve packet contents in 4-8 ounces of cold water or juice.               potassium chloride 10 mEq in 100 mL intermittent infusion with 10 mg lidocaine  Dose: 10 mEq Freq: EVERY 1 HOUR PRN Route: IV  PRN Reason: potassium supplementation  Start: 12/19/17 0946   Admin Instructions: Infuse via PERIPHERAL LINE. Use potassium with lidocaine for pain with peripheral  administration.  If Serum K+ 3.0-3.3, dose = 10 mEq/hr x4 doses (40 mEq IV total dose). Recheck K+ level 2 hours after dose and the next AM.  If Serum K+ less than 3.0, dose = 10 mEq/hr x6 doses (60 mEq IV total dose). Recheck K+ level 2 hours after dose and the next AM.               potassium chloride 10 mEq in 100 mL sterile water intermittent infusion (premix)  Dose: 10 mEq Freq: EVERY 1 HOUR PRN Route: IV  PRN Reason: potassium supplementation  Start: 12/19/17 0946   Admin Instructions: Infuse via PERIPHERAL LINE or CENTRAL LINE. Use for central line replacement if patient weight less than 65 kg, if patient is on TPN with high potassium content or if unit does not stock 20 mEq bags.   If Serum K+ 3.0-3.3, dose = 10 mEq/hr x4 doses (40 mEq IV total dose). Recheck K+ level 2 hours after dose and the next AM.   If Serum K+ less than 3.0, dose = 10 mEq/hr x6 doses (60 mEq IV total dose). Recheck K+ level 2 hours after dose and the next AM.               potassium chloride 20 mEq in 50 mL intermittent infusion  Dose: 20 mEq Freq: EVERY 1 HOUR PRN Route: IV  PRN Reason: potassium supplementation  Start: 12/19/17 0946   Admin Instructions: Infuse via CENTRAL LINE Only. May need EKG if less than 65 kg or on TPN - Max rate is 0.3 mEq/kg/hr for patients not on EKG monitoring.   If Serum K+ 3.0-3.3, dose = 20 mEq/hr x2 doses (40 mEq IV total dose). Recheck K+ level 2 hours after dose and the next AM.  If Serum K+ less than 3.0, dose = 20 mEq/hr x3 doses (60 mEq IV total dose). Recheck K+ level 2 hours after dose and the next AM.               potassium chloride SA (K-DUR/KLOR-CON M) CR tablet 20-40 mEq  Dose: 20-40 mEq Freq: EVERY 2 HOURS PRN Route: PO  PRN Reason: potassium supplementation  Start: 12/19/17 0946   Admin Instructions: Use if able to take PO.   If Serum K+ 3.0-3.3, dose = 60 mEq po total dose (40 mEq x1 followed in 2 hours by 20 mEq x1). Recheck K+ level 4 hours after dose and the next AM.  If Serum K+  2.5-2.9, dose = 80 mEq po total dose (40 mEq Q2H x2). Recheck K+ level 4 hours after dose and the next AM.  If Serum K+ less than 2.5, See IV order.  DO NOT CRUSH          1028 (40 mEq)-Given       1336 (20 mEq)-Given            senna-docusate (SENOKOT-S;PERICOLACE) 8.6-50 MG per tablet 1 tablet  Dose: 1 tablet Freq: 2 TIMES DAILY PRN Route: PO  PRN Reason: constipation  Start: 12/17/17 1852   Admin Instructions: If no bowel movement in 24 hours, increase to 2 tablets PO.  Hold for loose stools.  This is the first step of a three step constipation treatment.         2118 (1 tablet)-Given            Or  senna-docusate (SENOKOT-S;PERICOLACE) 8.6-50 MG per tablet 2 tablet  Dose: 2 tablet Freq: 2 TIMES DAILY PRN Route: PO  PRN Reason: constipation  Start: 12/17/17 1852   Admin Instructions: Hold for loose stools.  This is the first step of a three step constipation treatment.                     Completed Medications  Medications 12/14/17 12/15/17 12/16/17 12/17/17 12/18/17 12/19/17 12/20/17         Dose: 0.5 mL Freq: PRIOR TO DISCHARGE Route: IM  Start: 12/19/17 1000   End: 12/20/17 1058   Admin Instructions: Administer when afebrile (less than 100.4F) x 24 hours, or Prior to Discharge.  If not administering when scheduled , change the due time by following the instructions in the reference link below.  If patient refuses vaccine, chart as Vaccine Refused.           1058 (0.5 mL)-Given             Dose: 30 mg Freq: ONCE Route: PO  Indications of Use: INFLUENZA  Start: 12/17/17 1513   End: 12/17/17 1613       1613 (30 mg)-Given             Discontinued Medications  Medications 12/14/17 12/15/17 12/16/17 12/17/17 12/18/17 12/19/17 12/20/17         Dose: 25 mg Freq: DAILY Route: PO  Start: 12/18/17 0900   End: 12/19/17 1011   Admin Instructions: Hold if SBP < 110, DBP < 40         (0947)-Not Given        1011-Med Discontinued  (1014)-Not Given

## 2017-12-17 NOTE — IP AVS SNAPSHOT
MRN:[id number]                      After Visit Summary   12/17/2017    Loco Hawkins    MRN: [id number]           Thank you!     Thank you for choosing [facility] for your care. Our goal is always to provide you with excellent care. Hearing back from our patients is one way we can continue to improve our services. Please take a few minutes to complete the written survey that you may receive in the mail after you visit with us. Thank you!        Patient Information     Date Of Birth          10/31/1921        Designated Caregiver       Most Recent Value    Caregiver    Will someone help with your care after discharge? no    Name of designated caregiver -- [Home Health]      About your hospital stay     You were admitted on:  December 17, 2017 You last received care in the:  [facility] [address] [facility]    You were discharged on:  December 20, 2017        Reason for your hospital stay       Influenza causing generalized weakness                  Who to Call     For medical emergencies, please call 911.  For non-urgent questions about your medical care, please call your primary care provider or clinic, [phone]          Attending Provider     Provider Specialty    Declan Robles MD Emergency Medicine    Hobson, Juan Luis Otero, DO Internal Medicine    RobkaranStef, DO Internal Medicine       Primary Care Provider Office Phone # Fax #    Baljeet Londono [phone]      After Care Instructions     Activity - Up with nursing assistance           Activity - Up with nursing assistance           Additional Discharge Instructions       Please follow up with your orthopedic surgeon for further treatment of the right foot pain  Will need follow up bmp in 3 days            Advance Diet as Tolerated       Follow this diet upon discharge: Orders Placed This Encounter      Regular Diet Adult            Fall precautions           General info for SNF       Length  "of Stay Estimate: Short Term Care: Estimated # of Days <30  Condition at Discharge: Stable  Level of care:skilled   Rehabilitation Potential: Fair  Admission H&P remains valid and up-to-date: Yes  Recent Chemotherapy: N/A  Use Nursing Home Standing Orders: Yes            Mantoux instructions       Give two-step Mantoux (PPD) Per Facility Policy Yes                  Follow-up Appointments     Follow Up and recommended labs and tests       Follow up with snf physician.  The following labs/tests are recommended: bmp within 3 days.                  Additional Services     Nutrition Services Adult IP Consult       Reason:  Elderly patient            Occupational Therapy Adult Consult       Evaluate and treat as clinically indicated.    Reason:  Generalized weakness            Physical Therapy Adult Consult       Evaluate and treat as clinically indicated.    Reason:  Generalized weaknees                  Pending Results     No orders found from 12/15/2017 to 12/18/2017.            Statement of Approval     Ordered          12/20/17 0820  I have reviewed and agree with all the recommendations and orders detailed in this document.  EFFECTIVE NOW     Approved and electronically signed by:  Stef Pineda DO           12/19/17 1024  I have reviewed and agree with all the recommendations and orders detailed in this document.  EFFECTIVE NOW     Approved and electronically signed by:  Stef Pineda DO             Admission Information     Date & Time Provider Department Dept. Phone    12/17/2017 Stef Pineda DO [facility] [address] [facility] [phone]      Your Vitals Were     Blood Pressure Pulse Temperature Respirations Height Weight    109/62 (BP Location: Right arm) 62 98.5  F (36.9  C) (Axillary) 16 1.702 m (5' 7\") 61.2 kg (135 lb)    Pulse Oximetry BMI (Body Mass Index)                97% 21.14 kg/m2          MyChart Information     [employer] lets you send " "messages to your doctor, view your test results, renew your prescriptions, schedule appointments and more. To sign up, go to [email or web address] . Click on \"Log in\" on the left side of the screen, which will take you to the Welcome page. Then click on \"Sign up Now\" on the right side of the page.     You will be asked to enter the access code listed below, as well as some personal information. Please follow the directions to create your username and password.     Your access code is: [email or web address]  Expires: 3/19/2018 11:26 AM     Your access code will  in 90 days. If you need help or a new code, please call your [facility] clinic or [phone].        Care EveryWhere ID     This is your Care EveryWhere ID. This could be used by other organizations to access your [facility] medical records  [id number]        Equal Access to Services     JANE PAYAN : Elina Matos, waleo barrera, mary kaalmada cande, neelima alarcon . So [facility] [phone].    ATENCIÓN: Si habla español, tiene a holliday disposición servicios gratuitos de asistencia lingüística. Sherice al [phone].    We comply with applicable federal civil rights laws and [place] laws. We do not discriminate on the basis of race, color, national origin, age, disability, sex, sexual orientation, or gender identity.               Review of your medicines      START taking        Dose / Directions    oseltamivir 30 MG capsule   Commonly known as:  TAMIFLU   Indication:  Flu        Dose:  30 mg   Take 1 capsule (30 mg) by mouth daily for 3 days   Quantity:  2 capsule   Refills:  0       potassium chloride 20 MEQ Packet   Commonly known as:  KLOR-CON   Used for:  Hypokalemia        Dose:  20 mEq   20 mEq by Oral or Feeding Tube route daily for 3 days   Quantity:  3 packet   Refills:  0       senna-docusate 8.6-50 MG per tablet   Commonly known as:  SENOKOT-S;PERICOLACE   Used for:  Generalized muscle weakness        " Dose:  2 tablet   Take 2 tablets by mouth 2 times daily as needed for constipation   Quantity:  100 tablet   Refills:  0         CONTINUE these medicines which have NOT CHANGED        Dose / Directions    ASPIRIN EC PO        Dose:  81 mg   Take 81 mg by mouth daily   Refills:  0       CARTIA  MG 24 hr capsule   Generic drug:  diltiazem        Dose:  180 mg   Take 180 mg by mouth daily   Refills:  0       DIGOXIN PO        Dose:  125 mcg   Take 125 mcg by mouth five times a week Monday-Friday   Refills:  0       DOCUSATE SODIUM PO        Dose:  100 mg   Take 100 mg by mouth 2 times daily   Refills:  0       dorzolamide-timolol 2-0.5 % ophthalmic solution   Commonly known as:  COSOPT        Dose:  1 drop   Place 1 drop into both eyes 2 times daily   Refills:  0       latanoprost 0.005 % ophthalmic solution   Commonly known as:  XALATAN        Dose:  1 drop   Place 1 drop into both eyes At Bedtime   Refills:  0       Multi-vitamin Tabs tablet        Dose:  1 tablet   Take 1 tablet by mouth daily   Refills:  0       SINEMET  MG per tablet   Generic drug:  carbidopa-levodopa        Dose:  2 tablet   Take 2 tablets by mouth 3 times daily   Refills:  0       VITAMIN D (CHOLECALCIFEROL) PO        Dose:  1 tablet   Take 1 tablet by mouth 2 times daily (OTC: Nurse did not know dose)   Refills:  0         STOP taking     HYDROCHLOROTHIAZIDE PO                Where to get your medicines      Some of these will need a paper prescription and others can be bought over the counter. Ask your nurse if you have questions.     You don't need a prescription for these medications     oseltamivir 30 MG capsule    potassium chloride 20 MEQ Packet    senna-docusate 8.6-50 MG per tablet               ANTIBIOTIC INSTRUCTION     You've Been Prescribed an Antibiotic - Now What?  Your healthcare team thinks that you or your loved one might have an infection. Some infections can be treated with antibiotics, which are powerful,  life-saving drugs. Like all medications, antibiotics have side effects and should only be used when necessary. There are some important things you should know about your antibiotic treatment.      Your healthcare team may run tests before you start taking an antibiotic.    Your team may take samples (e.g., from your blood, urine or other areas) to run tests to look for bacteria. These test can be important to determine if you need an antibiotic at all and, if you do, which antibiotic will work best.      Within a few days, your healthcare team might change or even stop your antibiotic.    Your team may start you on an antibiotic while they are working to find out what is making you sick.    Your team might change your antibiotic because test results show that a different antibiotic would be better to treat your infection.    In some cases, once your team has more information, they learn that you do not need an antibiotic at all. They may find out that you don't have an infection, or that the antibiotic you're taking won't work against your infection. For example, an infection caused by a virus can't be treated with antibiotics. Staying on an antibiotic when you don't need it is more likely to be harmful than helpful.      You may experience side effects from your antibiotic.    Like all medications, antibiotics have side effects. Some of these can be serious.    Let you healthcare team know if you have any known allergies when you are admitted to the hospital.    One significant side effect of nearly all antibiotics is the risk of severe and sometimes deadly diarrhea caused by Clostridium difficile (C. Difficile). This occurs when a person takes antibiotics because some good germs are destroyed. Antibiotic use allows C. diificile to take over, putting patients at high risk for this serious infection.    As a patient or caregiver, it is important to understand your or your loved one's antibiotic treatment. It is  especially important for caregivers to speak up when patients can't speak for themselves. Here are some important questions to ask your healthcare team.    What infection is this antibiotic treating and how do you know I have that infection?    What side effects might occur from this antibiotic?    How long will I need to take this antibiotic?    Is it safe to take this antibiotic with other medications or supplements (e.g., vitamins) that I am taking?     Are there any special directions I need to know about taking this antibiotic? For example, should I take it with food?    How will I be monitored to know whether my infection is responding to the antibiotic?    What tests may help to make sure the right antibiotic is prescribed for me?      Information provided by:  www.cdc.gov/getsmart  U.S. Department of Health and Human Services  Centers for disease Control and Prevention  National Center for Emerging and Zoonotic Infectious Diseases  Division of Healthcare Quality Promotion         Protect others around you: Learn how to safely use, store and throw away your medicines at www.disposemymeds.org.             Medication List: This is a list of all your medications and when to take them. Check marks below indicate your daily home schedule. Keep this list as a reference.      Medications           Morning Afternoon Evening Bedtime As Needed    ASPIRIN EC PO   Take 81 mg by mouth daily   Last time this was given:  81 mg on 12/20/2017  8:51 AM                                CARTIA  MG 24 hr capsule   Take 180 mg by mouth daily   Generic drug:  diltiazem                                DIGOXIN PO   Take 125 mcg by mouth five times a week Monday-Friday   Last time this was given:  125 mcg on 12/20/2017  8:54 AM                                DOCUSATE SODIUM PO   Take 100 mg by mouth 2 times daily   Last time this was given:  100 mg on 12/20/2017  8:51 AM                                dorzolamide-timolol 2-0.5 %  ophthalmic solution   Commonly known as:  COSOPT   Place 1 drop into both eyes 2 times daily                                latanoprost 0.005 % ophthalmic solution   Commonly known as:  XALATAN   Place 1 drop into both eyes At Bedtime   Last time this was given:  1 drop on 12/19/2017 10:55 PM                                Multi-vitamin Tabs tablet   Take 1 tablet by mouth daily                                oseltamivir 30 MG capsule   Commonly known as:  TAMIFLU   Take 1 capsule (30 mg) by mouth daily for 3 days   Last time this was given:  30 mg on 12/20/2017  8:51 AM                                potassium chloride 20 MEQ Packet   Commonly known as:  KLOR-CON   20 mEq by Oral or Feeding Tube route daily for 3 days                                senna-docusate 8.6-50 MG per tablet   Commonly known as:  SENOKOT-S;PERICOLACE   Take 2 tablets by mouth 2 times daily as needed for constipation   Last time this was given:  1 tablet on 12/18/2017  9:18 PM                                SINEMET  MG per tablet   Take 2 tablets by mouth 3 times daily   Last time this was given:  2 tablets on 12/20/2017  8:50 AM   Generic drug:  carbidopa-levodopa                                VITAMIN D (CHOLECALCIFEROL) PO   Take 1 tablet by mouth 2 times daily (OTC: Nurse did not know dose)   Last time this was given:  1,000 Units on 12/20/2017  8:51 AM                                          More Information        The Flu (Influenza)     The virus that causes the flu spreads through the air in droplets when someone who has the flu coughs, sneezes, laughs, or talks.   The flu (influenza) is an infection that affects your respiratory tract. This tract is made up of your mouth, nose, and lungs, and the passages between them. Unlike a cold, the flu can make you very ill. And it can lead to pneumonia, a serious lung infection. The flu can have serious complications and even cause death.  Who is at risk for the flu?  Anyone can get  the flu. But you are more likely to become infected if you:    Have a weakened immune system    Work in a healthcare setting where you may be exposed to flu germs    Live or work with someone who has the flu    Haven t had an annual flu shot  How does the flu spread?  The flu is caused by a virus. The virus spreads through the air in droplets when someone who has the flu coughs, sneezes, laughs, or talks. You can become infected when you inhale these viruses directly. You can also become infected when you touch a surface on which the droplets have landed and then transfer the germs to your eyes, nose, or mouth. Touching used tissues, or sharing utensils, drinking glasses, or a toothbrush from an infected person can expose you to flu viruses, too.  What are the symptoms of the flu?  Flu symptoms tend to come on quickly and may last a few days to a few weeks. They include:    Fever usually higher than 100.4 F  (38 C) and chills    Sore throat and headache    Dry cough    Runny nose    Tiredness and weakness    Muscle aches  Who is at risk for flu complications?  For some people, the flu can be very serious. The risk for complications is greater for:    Children younger than age 5    Adults ages 65 and older    People with a chronic illness such as diabetes or heart, kidney, or lung disease    People who live in a nursing home or long-term care facility   How is the flu treated?  The flu usually gets better after 7 days or so. In some cases, your healthcare provider may prescribe an antiviral medicine. This may help you get well a little sooner. For the medicine to help, you need to take it as soon as possible (ideally within 48 hours) after your symptoms start. If you develop pneumonia or other serious illness, you may need to stay in the hospital.  Easing flu symptoms    Drink lots of fluids such as water, juice, and warm soup. A good rule is to drink enough so that you urinate your normal amount.    Get plenty of  rest.    Ask your healthcare provider what to take for fever and pain.    Call your provider if your fever is 100.4 F (38 C) or higher, or you become dizzy, lightheaded, or short of breath.  Taking steps to protect others    Wash your hands often, especially after coughing or sneezing. Or clean your hands with an alcohol-based hand  containing at least 60% alcohol.    Cough or sneeze into a tissue. Then throw the tissue away and wash your hands. If you don t have a tissue, cough and sneeze into your elbow.    Stay home until at least 24 hours after you no longer have a fever or chills. Be sure the fever isn t being hidden by fever-reducing medicine.    Don t share food, utensils, drinking glasses, or a toothbrush with others.    Ask your healthcare provider if others in your household should get antiviral medicine to help them avoid infection.  How can the flu be prevented?    One of the best ways to avoid the flu is to get a flu vaccine each year. The virus that causes the flu changes from year to year. For that reason, healthcare providers recommend getting the flu vaccine each year, as soon as it's available in your area. The vaccine is given as a shot. Your healthcare provider can tell you which vaccine is right for you. A nasal spray is also available but is not recommended for the 1152-4952 flu season. The CDC says this is because the nasal spray did not seem to protect against the flu over the last several flu seasons. In the past, it was meant for people ages 2 to 49.    Wash your hands often. Frequent handwashing is a proven way to help prevent infection.    Carry an alcohol-based hand gel containing at least 60% alcohol. Use it when you can't use soap and water. Then wash your hands as soon as you can.    Avoid touching your eyes, nose, and mouth.    At home and work, clean phones, computer keyboards, and toys often with disinfectant wipes.    If possible, avoid close contact with others who have  the flu or symptoms of the flu.  Handwashing tips  Handwashing is one of the best ways to prevent many common infections. If you are caring for or visiting someone with the flu, wash your hands each time you enter and leave the room. Follow these steps:    Use warm water and plenty of soap. Rub your hands together well.    Clean the whole hand, including under your nails, between your fingers, and up the wrists.    Wash for at least 15 seconds.    Rinse, letting the water run down your fingers, not up your wrists.    Dry your hands well. Use a paper towel to turn off the faucet and open the door.  Using alcohol-based hand   Alcohol-based hand  are also a good choice. Use them when you can't use soap and water. Follow these steps:    Squeeze about a tablespoon of gel into the palm of one hand.    Rub your hands together briskly, cleaning the backs of your hands, the palms, between your fingers, and up the wrists.    Rub until the gel is gone and your hands are completely dry.  Preventing the flu in healthcare settings  The flu is a special concern for people in hospitals and long-term care facilities. To help prevent the spread of flu, many hospitals and nursing homes take these steps:    Healthcare providers wash their hands or use an alcohol-based hand  before and after treating each patient.    People with the flu have private rooms and bathrooms or share a room with someone with the same infection.    People who are at high risk for the flu but don't have it are encouraged to get the flu and pneumonia vaccines.    All healthcare workers are encouraged or required to get flu shots.   Date Last Reviewed: 12/1/2016 2000-2017 [employer]. [address] [place] [address], [place] [address]. All rights reserved. This information is not intended as a substitute for professional medical care. Always follow your healthcare professional's instructions.                What is  Metatarsalgia?  Metatarsalgia is pain in the ball of your foot. It is often caused by wearing shoes with thin soles and high heels. This puts extra pressure on the bones in the ball of the foot. Standing or walking on a hard surface for long periods also puts added pressure on the bones, causing pain. The pain can occur under any of the five metatarsal bones, although it's most common in the second. Bent or twisted toes and bunions can make the problem worse. So can being overweight. Sometimes high arches or arthritis can also cause metatarsalgia.    Inside the ball of your foot  The long bones in the middle of your foot are called the metatarsal bones. Each metatarsal bone ends in the ball of the foot. When you walk, these bones bear the weight of your body as your foot pushes off the ground. If there is more pressure on the end of one bone, it presses on the skin beneath it. This causes pain and inflammation in the ball of the foot (metatarsalgia). A callus (a hard growth of skin) may also form on the ball of the foot.    Symptoms  The most common symptom of metatarsalgia is pain in the ball of the foot. It may feel as if you have a stone in your shoe. The ball of the foot may also become red and inflamed, and a callus may form under the end of the metatarsal bone.   Date Last Reviewed: 9/29/2015 2000-2017 [employer] [address] [place] [address], [place] [address]. All rights reserved. This information is not intended as a substitute for professional medical care. Always follow your healthcare professional's instructions.

## 2017-12-17 NOTE — IP AVS SNAPSHOT
Loco Hawkins #[id number] (CSN: [id number])  (89 year old M)  (Adm: 17)     [id number]               [place] [address] [facility]: [phone]            Patient Demographics     Patient Name Sex          Age SSN Address Phone    RossTristanLoco E Male 10/31/1921 (89 year old) [id number]   [place] [address] [phone] (Home)      Emergency Contact(s)     Name Relation Home Work Mobile    Gissell Hawkins Spouse [phone]      Yeison Bolanos Relative [phone]      Admission Information     Attending Provider Admitting Provider Admission Type Admission Date/Time    Stef Pineda, Stef Berger, DO Emergency 17  1354    Discharge Date Hospital Service Auth/Cert Status Service Area     Hospitalist [facility] HEALTH SERVICES    Unit Room/Bed Admission Status       [facility] MED SPEC UNIT 6625/6625-01 Admission (Confirmed)       Admission     Complaint    Influenza A, Influenza      Hospital Account     Name Acct ID Class Status Primary Coverage    Loco Hawkins [id number] Inpatient Open MEDICARE - MEDICARE FOR HB SUPPLEMENT            Guarantor Account (for Hospital Account #[id number])     Name Relation to Pt Service Area Active? Acct Type    Loco Hawkins  FCS Yes Personal/Family    Address Phone          [address]   [place] [address] [phone](H)              Coverage Information (for Hospital Account #[id number])     1. MEDICARE/MEDICARE FOR HB SUPPLEMENT     F/O Payor/Plan Precert #    MEDICARE/MEDICARE FOR HB SUPPLEMENT     Subscriber Subscriber #    Loco Hawkins [id number]    Address Phone    [employer]  [address]  [place] [address] [phone]          2. [employer] PLAN     F/O Payor/Plan Precert #    [employer]ED[employer]AN     Subscriber Subscriber #    Loco Hawkins [id number]    Address  "Phone    [address]  [place] [address] [phone]                                                      INTERAGENCY TRANSFER FORM - PHYSICIAN ORDERS   12/17/2017                       [place] [address] [facility]IT: [phone]            Attending Provider: Stef Pineda DO     Allergies:  No Known Allergies    Infection:  None   Service:  HOSPITALIST    Ht:  1.702 m (5' 7\")   Wt:  61.2 kg (135 lb)   Admission Wt:  61.2 kg (135 lb)    BMI:  21.14 kg/m 2   BSA:  1.7 m 2            ED Clinical Impression     Diagnosis Description Comment Added By Time Added    Influenza A [J10.1] Influenza A [J10.1]  Declan Robles MD 12/17/2017  3:11 PM    Generalized muscle weakness [M62.81] Generalized muscle weakness [M62.81]  Declan Robles MD 12/17/2017  3:11 PM      Hospital Problems as of 12/21/2017              Priority Class Noted POA    Influenza A Medium  12/17/2017 Yes    Influenza Medium  12/18/2017 Yes      Non-Hospital Problems as of 12/21/2017              Priority Class Noted    iamSPRAIN ROTATOR CUFF Medium  8/2/2005      Code Status History     Date Active Date Inactive Code Status Order ID Comments User Context    12/19/2017 10:22 AM  Full Code [id number]  Stef Pineda DO Outpatient    12/18/2017  9:56 AM 12/19/2017 10:22 AM Full Code [id number]  Stef Pineda DO Inpatient      Current Code Status     Date Active Code Status Order ID Comments User Context       Prior      Summary of Visit     Reason for your hospital stay       Influenza causing generalized weakness                Medication Review      START taking        Dose / Directions Comments    oseltamivir 30 MG capsule   Commonly known as:  TAMIFLU   Indication:  Flu        Dose:  30 mg   Take 1 capsule (30 mg) by mouth daily for 3 days   Quantity:  2 capsule   Refills:  0        potassium chloride 20 MEQ Packet   Commonly known as:  KLOR-CON   Used for:  Hypokalemia        Dose:  20 mEq   20 " mEq by Oral or Feeding Tube route daily for 3 days   Quantity:  3 packet   Refills:  0        senna-docusate 8.6-50 MG per tablet   Commonly known as:  SENOKOT-S;PERICOLACE   Used for:  Generalized muscle weakness        Dose:  2 tablet   Take 2 tablets by mouth 2 times daily as needed for constipation   Quantity:  100 tablet   Refills:  0          CONTINUE these medications which have NOT CHANGED        Dose / Directions Comments    ASPIRIN EC PO        Dose:  81 mg   Take 81 mg by mouth daily   Refills:  0        CARTIA  MG 24 hr capsule   Generic drug:  diltiazem        Dose:  180 mg   Take 180 mg by mouth daily   Refills:  0        DIGOXIN PO        Dose:  125 mcg   Take 125 mcg by mouth five times a week Monday-Friday   Refills:  0        DOCUSATE SODIUM PO        Dose:  100 mg   Take 100 mg by mouth 2 times daily   Refills:  0        dorzolamide-timolol 2-0.5 % ophthalmic solution   Commonly known as:  COSOPT        Dose:  1 drop   Place 1 drop into both eyes 2 times daily   Refills:  0        latanoprost 0.005 % ophthalmic solution   Commonly known as:  XALATAN        Dose:  1 drop   Place 1 drop into both eyes At Bedtime   Refills:  0        Multi-vitamin Tabs tablet        Dose:  1 tablet   Take 1 tablet by mouth daily   Refills:  0        SINEMET  MG per tablet   Generic drug:  carbidopa-levodopa        Dose:  2 tablet   Take 2 tablets by mouth 3 times daily   Refills:  0        VITAMIN D (CHOLECALCIFEROL) PO        Dose:  1 tablet   Take 1 tablet by mouth 2 times daily (OTC: Nurse did not know dose)   Refills:  0          STOP taking     HYDROCHLOROTHIAZIDE PO                   After Care     Activity - Up with nursing assistance           Activity - Up with nursing assistance           Additional Discharge Instructions       Please follow up with your orthopedic surgeon for further treatment of the right foot pain  Will need follow up bmp in 3 days       Advance Diet as Tolerated       Follow  this diet upon discharge: Orders Placed This Encounter      Regular Diet Adult       Fall precautions           General info for SNF       Length of Stay Estimate: Short Term Care: Estimated # of Days <30  Condition at Discharge: Stable  Level of care:skilled   Rehabilitation Potential: Fair  Admission H&P remains valid and up-to-date: Yes  Recent Chemotherapy: N/A  Use Nursing Home Standing Orders: Yes       Mantoux instructions       Give two-step Mantoux (PPD) Per Facility Policy Yes             Referrals     Nutrition Services Adult IP Consult       Reason:  Elderly patient       Occupational Therapy Adult Consult       Evaluate and treat as clinically indicated.    Reason:  Generalized weakness       Physical Therapy Adult Consult       Evaluate and treat as clinically indicated.    Reason:  Generalized weaknees             Follow-Up Appointment Instructions     Follow Up and recommended labs and tests       Follow up with [facility] physician.  The following labs/tests are recommended: bmp within 3 days.             Statement of Approval     Ordered          12/21/17 0759  I have reviewed and agree with all the recommendations and orders detailed in this document.  EFFECTIVE NOW     Approved and electronically signed by:  Stef Pineda DO           12/20/17 0820  I have reviewed and agree with all the recommendations and orders detailed in this document.  EFFECTIVE NOW     Approved and electronically signed by:  Stef Pineda DO           12/19/17 1024  I have reviewed and agree with all the recommendations and orders detailed in this document.  EFFECTIVE NOW     Approved and electronically signed by:  Stef Pineda DO                                                 INTERAGENCY TRANSFER FORM - NURSING   12/17/2017                       [place] [address] [facility]IT: [phone]            Attending Provider: Stef Pineda DO     Allergies:  No  "Known Allergies    Infection:  None   Service:  HOSPITALIST    Ht:  1.702 m (5' 7\")   Wt:  61.2 kg (135 lb)   Admission Wt:  61.2 kg (135 lb)    BMI:  21.14 kg/m 2   BSA:  1.7 m 2            Advance Directives        Does patient have a scanned Advance Directive/ACP document in EPIC?           Yes        Immunizations     Name Date      Influenza (High Dose) 3 valent vaccine 12/20/17       ASSESSMENT     Discharge Profile Flowsheet     EXPECTED DISCHARGE     SKIN      Expected Discharge Date  12/21/17 (tcu if mri stable) 12/20/17 1414   Inspection of bony prominences  Full 12/20/17 2243    DISCHARGE NEEDS ASSESSMENT     Inspection under devices  Full 12/20/17 2243    Equipment Currently Used at Home  walker, rolling;wheelchair, manual 12/18/17 1347   Skin WDL  ex 12/20/17 2243    Transportation Available  family or friend will provide 12/18/17 1347   Skin Color/Characteristics  bruised (ecchymotic) 12/20/17 2243    # of Referrals Placed by CTS  Post Acute Facilities;Transportation 12/21/17 1108   Skin Temperature  warm 12/20/17 2243    GASTROINTESTINAL (ADULT,PEDIATRIC,OB)     Skin Moisture  dry 12/20/17 2243    GI WDL  WDL 12/20/17 2243   Skin Elasticity  slow return to original state 12/20/17 2243    All Quadrants Bowel Sounds  audible and normoactive 12/20/17 2243   Skin Integrity  bruise(s);scab(s);wound(s) 12/20/17 2243    Last Bowel Movement  12/19/17 12/19/17 1630   Additional Documentation  Wound (LDA) 12/19/17 0626    GI Signs/Symptoms  fecal incontinence 12/19/17 1630   SAFETY      Passing flatus  yes 12/20/17 2243   Safety WDL  WDL 12/20/17 2243    COMMUNICATION ASSESSMENT     Safety Factors  call light in reach;ID band on;bed in low position;upper side rails raised x 2 12/20/17 2243    Patient's communication style  spoken language (English or Bilingual) 12/17/17 1249   All Alarms  alarm(s) activated and audible 12/20/17 2122                 Assessment WDL (Within Defined Limits) Definitions         " "  Safety WDL     Effective: 09/28/15    Row Information: <b>WDL Definition:</b> Bed in low position, wheels locked; call light in reach; upper side rails up x 2; ID band on<br> <font color=\"gray\"><i>Item=AS safety wdl>>List=AS safety wdl>>Version=F14</i></font>      Skin WDL     Effective: 09/28/15    Row Information: <b>WDL Definition:</b> Warm; dry; intact; elastic; without discoloration; pressure points without redness<br> <font color=\"gray\"><i>Item=AS skin wdl>>List=AS skin wdl>>Version=F14</i></font>      Vitals     Vital Signs Flowsheet     VITAL SIGNS     Pain Management Interventions  medication offered but refused;relaxation techniques promoted 12/20/17 0031    Temp  97.8  F (36.6  C) 12/21/17 0734   Pain Intervention(s)  Medication (See eMAR);Repositioned 12/19/17 1620    Temp src  Oral 12/21/17 0734   Response to Interventions  Decrease in pain 12/19/17 1338    Resp  16 12/21/17 0734   ANALGESIA SIDE EFFECTS MONITORING      Pulse  59 12/21/17 0252   Side Effects Monitoring: Respiratory Quality  R 12/19/17 1620    Heart Rate  55 12/21/17 0939   Side Effects Monitoring: Respiratory Depth  N 12/19/17 1620    Pulse/Heart Rate Source  Monitor 12/21/17 0734   Side Effects Monitoring: Sedation Level  1 12/19/17 1620    BP  120/53 12/21/17 0734   HEIGHT AND WEIGHT      BP Location  Right arm 12/21/17 0734   Height  1.702 m (5' 7\") 12/17/17 1243    OXYGEN THERAPY     Height Method  Stated 12/17/17 1243    SpO2  98 % 12/21/17 0734   Weight  61.2 kg (135 lb) 12/17/17 1243    O2 Device  None (Room air) 12/21/17 0734   BSA (Calculated - sq m)  1.7 12/17/17 1243    PAIN/COMFORT     BMI (Calculated)  21.19 12/17/17 1243    Patient Currently in Pain  denies 12/21/17 0252   POSITIONING      Preferred Pain Scale  word (verbal rating pain scale) 12/20/17 0031   Body Position  side-lying, left 12/21/17 1112    0-10 Pain Scale  3 12/19/17 1828   Head of Bed (HOB)  HOB at 30-45 degrees 12/21/17 1112    Word Pain Scale  2 " 12/20/17 0031   Positioning/Transfer Devices  pillows;in use 12/21/17 1112    Pain Location  Back 12/20/17 0031   DAILY CARE      Pain Orientation  Right 12/19/17 1620   Activity Management  activity adjusted per tolerance 12/21/17 0601    Pain Descriptors  -- (chronic) 12/20/17 0031   Activity Assistance Provided  assistance, 2 people 12/21/17 0601            Patient Lines/Drains/Airways Status    Active LINES/DRAINS/AIRWAYS     Name: Placement date: Placement time: Site: Days: Last dressing change:    Peripheral IV 12/17/17 Right Lower forearm 12/17/17   1419   Lower forearm   3     Pressure Injury 12/18/17 Posterior Buttocks small non-blanchable red area on buttocks, mepilex applied. 12/18/17   0400    3     Wound 12/18/17 Toe (Comment  which one) Scabbing wound on right 3rd toe 12/18/17   2322   Toe (Comment  which one)   2             Patient Lines/Drains/Airways Status    Active PICC/CVC     None            Intake/Output Detail Report     Date Intake   Output Net    Shift P.O. IV Piggyback Total Urine Total       Noc 12/19/17 2300 - 12/20/17 0659 -- -- -- -- -- 0    Day 12/20/17 0700 - 12/20/17 1459 -- -- -- -- -- 0    Nancy 12/20/17 1500 - 12/20/17 2259 -- -- -- -- -- 0    Noc 12/20/17 2300 - 12/21/17 0659 -- -- -- -- -- 0    Day 12/21/17 0700 - 12/21/17 1459 240 -- 240 -- -- 240      Last Void/BM       Most Recent Value    Urine Occurrence 1 at 12/21/2017 0910    Stool Occurrence 0 at 12/20/2017 1050      Case Management/Discharge Planning     Case Management/Discharge Planning Flowsheet     REFERRAL INFORMATION     DISCHARGE PLANNING      # of Referrals Placed by CTS  Post Acute Facilities;Transportation 12/21/17 1108   Transportation Available  family or friend will provide 12/18/17 1347    Post Acute Facilities  TCU 12/21/17 1108   FINAL RESOURCES      CTS Assigned to Case  Madina Huan 12/21/17 1108   Equipment Currently Used at Home  walker, rolling;wheelchair, manual 12/18/17 1347    LIVING  ENVIRONMENT     ABUSE RISK SCREEN      Lives With  spouse (Gissell) 12/18/17 1344   QUESTION TO PATIENT:  Has a member of your family or a partner(now or in the past) intimidated, hurt, manipulated, or controlled you in any way?  patient declined to answer or is unable to answer 12/17/17 1251    Living Arrangements  assisted living 12/18/17 1345   QUESTION TO PATIENT: Do you feel safe going back to the place where you are living?  patient declined to answer or is unable to answer 12/17/17 1251    COPING/STRESS     OBSERVATION: Is there reason to believe there has been maltreatment of a vulnerable adult (ie. Physical/Sexual/Emotional abuse, self neglect, lack of adequate food, shelter, medical care, or financial exploitation)?  no 12/17/17 1251    Major Change/Loss/Stressor  none 12/17/17 1928   (R) MENTAL HEALTH SUICIDE RISK      EXPECTED DISCHARGE     Are you depressed or being treated for depression?  No 12/17/17 2040    Expected Discharge Date  12/21/17 (tcu if mri stable) 12/20/17 1414                       [place] [address] [facility]TY UNIT: [phone]            Medication Administration Report for Loco Hawkins as of 12/21/17 1123   Legend:    Given Hold Not Given Due Canceled Entry Other Actions    Time Time (Time) Time  Time-Action       Inactive    Active    Linked        Medications 12/15/17 12/16/17 12/17/17 12/18/17 12/19/17 12/20/17 12/21/17    acetaminophen (TYLENOL) Suppository 650 mg  Dose: 650 mg Freq: EVERY 4 HOURS PRN Route: RE  PRN Reason: mild pain  Start: 12/17/17 1852   Admin Instructions: Alternate ibuprofen (if ordered) with acetaminophen.  Maximum acetaminophen dose from all sources = 75 mg/kg/day not to exceed 4 grams/day.               acetaminophen (TYLENOL) tablet 650 mg  Dose: 650 mg Freq: EVERY 4 HOURS PRN Route: PO  PRN Reason: mild pain  Start: 12/17/17 1852   Admin Instructions: Alternate ibuprofen (if ordered) with acetaminophen.  Maximum acetaminophen dose from  all sources = 75 mg/kg/day not to exceed 4 grams/day.        0358 (650 mg)-Given       1111 (650 mg)-Given       1604 (650 mg)-Given       2118 (650 mg)-Given        0156 (650 mg)-Given [C]       1028 (650 mg)-Given             aspirin EC EC tablet 81 mg  Dose: 81 mg Freq: DAILY Route: PO  Start: 12/18/17 0900       0839 (81 mg)-Given        1012 (81 mg)-Given        0851 (81 mg)-Given        0849 (81 mg)-Given           carbidopa-levodopa (SINEMET)  MG per tablet 2 tablet  Dose: 2 tablet Freq: 3 TIMES DAILY Route: PO  Start: 12/17/17 2200      2137 (2 tablet)-Given        0839 (2 tablet)-Given       1604 (2 tablet)-Given       2117 (2 tablet)-Given        1011 (2 tablet)-Given       1709 (2 tablet)-Given       2127 (2 tablet)-Given        0850 (2 tablet)-Given       1702 (2 tablet)-Given       2107 (2 tablet)-Given        0848 (2 tablet)-Given       [ ] 1600       [ ] 2200           cholecalciferol (vitamin D3) tablet 1,000 Units  Dose: 1,000 Units Freq: 2 TIMES DAILY Route: PO  Start: 12/17/17 2100      2137 (1,000 Units)-Given        0839 (1,000 Units)-Given       2117 (1,000 Units)-Given        1011 (1,000 Units)-Given       2127 (1,000 Units)-Given        0851 (1,000 Units)-Given       2107 (1,000 Units)-Given        0849 (1,000 Units)-Given       [ ] 2100           digoxin (LANOXIN) tablet 125 mcg  Dose: 125 mcg Freq: Once per day on Mon Tue Wed Thu Fri Route: PO  Start: 12/18/17 0900       1111 (125 mcg)-Given        1012 (125 mcg)-Given        0854 (125 mcg)-Given        (0938)-Not Given           diltiazem (DILACOR XR) 24 hr capsule 180 mg  Dose: 180 mg Freq: DAILY Route: PO  Start: 12/18/17 0900   Admin Instructions: DO NOT CRUSH.    Hold if SBP < 110, DBP < 40        (0946)-Not Given        (0830)-Not Given        0854 (180 mg)-Given        (0849)-Not Given [C]           docusate sodium (COLACE) capsule 100 mg  Dose: 100 mg Freq: 2 TIMES DAILY Route: PO  Start: 12/17/17 4424      2132 (100  mg)-Given        0839 (100 mg)-Given       (2307)-Not Given [C]        1011 (100 mg)-Given       2127 (100 mg)-Given        0851 (100 mg)-Given       2107 (100 mg)-Given        0848 (100 mg)-Given       [ ] 2100           latanoprost (XALATAN) 0.005 % ophthalmic solution 1 drop  Dose: 1 drop Freq: AT BEDTIME Route: Both Eyes  Start: 12/17/17 2200   Admin Instructions: Refrigerated product.       (2221)-Not Given        2117 (1 drop)-Given        2255 (1 drop)-Given        2107 (1 drop)-Given        [ ] 2200           naloxone (NARCAN) injection 0.1-0.4 mg  Dose: 0.1-0.4 mg Freq: EVERY 2 MIN PRN Route: IV  PRN Reason: opioid reversal  Start: 12/17/17 1852   Admin Instructions: For respiratory rate LESS than or EQUAL to 8.  Partial reversal dose:  0.1 mg titrated q 2 minutes for Analgesia Side Effects Monitoring Sedation Level of 3 (frequently drowsy, arousable, drifts to sleep during conversation).Full reversal dose:  0.4 mg bolus for Analgesia Side Effects Monitoring Sedation Level of 4 (somnolent, minimal or no response to stimulation).  For ordered doses up to 2mg give IVP. Give each 0.4mg over 15 seconds in emergency situations. For non-emergent situations further dilute in 9mL of NS to facilitate titration of response.               ondansetron (ZOFRAN-ODT) ODT tab 4 mg  Dose: 4 mg Freq: EVERY 6 HOURS PRN Route: PO  PRN Reasons: nausea,vomiting  Start: 12/17/17 1852   Admin Instructions: This is Step 1 of nausea and vomiting management.  If nausea not resolved in 15 minutes, go to Step 2 prochlorperazine (COMPAZINE). Do not push through foil backing. Peel back foil and gently remove. Place on tongue immediately. Administration with liquid unnecessary              Or  ondansetron (ZOFRAN) injection 4 mg  Dose: 4 mg Freq: EVERY 6 HOURS PRN Route: IV  PRN Reasons: nausea,vomiting  Start: 12/17/17 1852   Admin Instructions: This is Step 1 of nausea and vomiting management.  If nausea not resolved in 15 minutes, go  to Step 2 prochlorperazine (COMPAZINE).  Irritant. For ordered doses up to 4 mg, give IV Push undiluted over 2-5 minutes.               oseltamivir (TAMIFLU) capsule 30 mg  Dose: 30 mg Freq: DAILY Route: PO  Indications of Use: INFLUENZA  Start: 12/18/17 0900   Admin Instructions: Dose adjusted for CRCL = 25.3ml/min        0839 (30 mg)-Given        1011 (30 mg)-Given        0851 (30 mg)-Given        0849 (30 mg)-Given           potassium chloride (KLOR-CON) Packet 20-40 mEq  Dose: 20-40 mEq Freq: EVERY 2 HOURS PRN Route: ORAL OR FEED  PRN Reason: potassium supplementation  Start: 12/19/17 0946   Admin Instructions: Use if unable to tolerate tablets.  If Serum K+ 3.0-3.3, dose = 60 mEq po total dose (40 mEq x1 followed in 2 hours by 20 mEq x1). Recheck K+ level 4 hours after dose and the next AM.  If Serum K+ 2.5-2.9, dose = 80 mEq po total dose (40 mEq Q2H x2). Recheck K+ level 4 hours after dose and the next AM.  If Serum K+ less than 2.5, See IV order.  Dissolve packet contents in 4-8 ounces of cold water or juice.               potassium chloride 10 mEq in 100 mL intermittent infusion with 10 mg lidocaine  Dose: 10 mEq Freq: EVERY 1 HOUR PRN Route: IV  PRN Reason: potassium supplementation  Start: 12/19/17 0946   Admin Instructions: Infuse via PERIPHERAL LINE. Use potassium with lidocaine for pain with peripheral administration.  If Serum K+ 3.0-3.3, dose = 10 mEq/hr x4 doses (40 mEq IV total dose). Recheck K+ level 2 hours after dose and the next AM.  If Serum K+ less than 3.0, dose = 10 mEq/hr x6 doses (60 mEq IV total dose). Recheck K+ level 2 hours after dose and the next AM.               potassium chloride 10 mEq in 100 mL sterile water intermittent infusion (premix)  Dose: 10 mEq Freq: EVERY 1 HOUR PRN Route: IV  PRN Reason: potassium supplementation  Start: 12/19/17 0946   Admin Instructions: Infuse via PERIPHERAL LINE or CENTRAL LINE. Use for central line replacement if patient weight less than 65 kg, if  patient is on TPN with high potassium content or if unit does not stock 20 mEq bags.   If Serum K+ 3.0-3.3, dose = 10 mEq/hr x4 doses (40 mEq IV total dose). Recheck K+ level 2 hours after dose and the next AM.   If Serum K+ less than 3.0, dose = 10 mEq/hr x6 doses (60 mEq IV total dose). Recheck K+ level 2 hours after dose and the next AM.               potassium chloride 20 mEq in 50 mL intermittent infusion  Dose: 20 mEq Freq: EVERY 1 HOUR PRN Route: IV  PRN Reason: potassium supplementation  Start: 12/19/17 0946   Admin Instructions: Infuse via CENTRAL LINE Only. May need EKG if less than 65 kg or on TPN - Max rate is 0.3 mEq/kg/hr for patients not on EKG monitoring.   If Serum K+ 3.0-3.3, dose = 20 mEq/hr x2 doses (40 mEq IV total dose). Recheck K+ level 2 hours after dose and the next AM.  If Serum K+ less than 3.0, dose = 20 mEq/hr x3 doses (60 mEq IV total dose). Recheck K+ level 2 hours after dose and the next AM.               potassium chloride SA (K-DUR/KLOR-CON M) CR tablet 20-40 mEq  Dose: 20-40 mEq Freq: EVERY 2 HOURS PRN Route: PO  PRN Reason: potassium supplementation  Start: 12/19/17 0946   Admin Instructions: Use if able to take PO.   If Serum K+ 3.0-3.3, dose = 60 mEq po total dose (40 mEq x1 followed in 2 hours by 20 mEq x1). Recheck K+ level 4 hours after dose and the next AM.  If Serum K+ 2.5-2.9, dose = 80 mEq po total dose (40 mEq Q2H x2). Recheck K+ level 4 hours after dose and the next AM.  If Serum K+ less than 2.5, See IV order.  DO NOT CRUSH         1028 (40 mEq)-Given       1336 (20 mEq)-Given             senna-docusate (SENOKOT-S;PERICOLACE) 8.6-50 MG per tablet 1 tablet  Dose: 1 tablet Freq: 2 TIMES DAILY PRN Route: PO  PRN Reason: constipation  Start: 12/17/17 1852   Admin Instructions: If no bowel movement in 24 hours, increase to 2 tablets PO.  Hold for loose stools.  This is the first step of a three step constipation treatment.        2118 (1 tablet)-Given              Or  senna-docusate (SENOKOT-S;PERICOLACE) 8.6-50 MG per tablet 2 tablet  Dose: 2 tablet Freq: 2 TIMES DAILY PRN Route: PO  PRN Reason: constipation  Start: 12/17/17 1852   Admin Instructions: Hold for loose stools.  This is the first step of a three step constipation treatment.                     Completed Medications  Medications 12/15/17 12/16/17 12/17/17 12/18/17 12/19/17 12/20/17 12/21/17         Dose: 0.5 mL Freq: PRIOR TO DISCHARGE Route: IM  Start: 12/19/17 1000   End: 12/20/17 1058   Admin Instructions: Administer when afebrile (less than 100.4F) x 24 hours, or Prior to Discharge.  If not administering when scheduled , change the due time by following the instructions in the reference link below.  If patient refuses vaccine, chart as Vaccine Refused.          1058 (0.5 mL)-Given           Discontinued Medications  Medications 12/15/17 12/16/17 12/17/17 12/18/17 12/19/17 12/20/17 12/21/17         Dose: 25 mg Freq: DAILY Route: PO  Start: 12/18/17 0900   End: 12/19/17 1011   Admin Instructions: Hold if SBP < 110, DBP < 40        (0947)-Not Given        1011-Med Discontinued  (1014)-Not Given                      INTERAGENCY TRANSFER FORM - NOTES (H&P, Discharge Summary, Consults, Procedures, Therapies)   12/17/2017                       [place] [address] [facility]: [phone]               History & Physicals      H&P by Juan Luis Hall DO at 12/17/2017  3:48 PM     Author:  Juan Luis Hall DO Service:  Hospitalist Author Type:  Physician    Filed:  12/17/2017  4:50 PM Date of Service:  12/17/2017  3:48 PM Creation Time:  12/17/2017  3:48 PM    Status:  Signed :  Juan Luis Hall DO (Physician)         [facility]    History and Physical  Hospitalist       Date of Admission:  12/17/2017    Assessment & Plan   Loco Hawkins is a 89 year old male with a history of Parkinson's Disease and dementia who presented with generalized weakness the  past 2-3 days and was found to be influenza A positive    Influenza A  Patient was influenza A positive in the ED.  This is the likely cause of the patient's acute symptoms including his generalized weakness and cough.  His vitals were normal in the ED and he was satting well on room air.  A CXR did not show any signs of infiltrate  -[JH1.1] Admission under obs status  - Tamiflu 30 mg BID given CrCl  - Tylenol PRN   - PT/OT/Social work consults to help with placement[JH1.2]    Parkinson's Disease  Dementia  Patient has no formal diagnosis for dementia or more specifically Lewy Body Dementia.[JH1.1]  - Will continue PTA Sinemet     Paroxysmal SVT   [facility] (notes in Epic).  Well controlled on Digoxin and Diltiazem.  Currently in NSR on telemetry and EKG from today showed sinus bradycardia.   - Will continue Digoxin and Diltiazem[JH1.2]     HTN[JH1.1]   Well controlled at this time  - Continue PTA HCTZ[JH1.2]     DVT Prophylaxis:[JH1.1] Pneumatic Compression Devices[JH1.2]  Code Status:[JH1.1] Unknown, patient has advanced directives per his nephew but he does not know what they are.  He will try to bring the paperwork in later today.[JH1.2]     Disposition: Expected discharge in[JH1.1] 1-2[JH1.2] days once[JH1.1] evaluated by PT/OT and possible escalation in outpatient cares can be arranged[JH1.2].    Juan Luis Hall,     Primary Care Physician   Baljeet Londono    Chief Complaint   Generalized weakness    History is obtained from the patient and his nephew.  His history is somewhat limited given his dementia    History of Present Illness   Loco Hawkins is a 89 year old male with a history of parkinsons disease who presents with[JH1.1] generalized weakness for the past 2-3 days.  Patient has also had a non-productive cough, subjective shortness of breath, chills and body aches, most prominent in left leg).  He has not had any reported fevers, chest pain, headaches,  sore throat, leg swelling.  Of note, the patient's wife has been ill the past couple of weeks with URI symptoms.  It is unknown if the patient received his influenza vaccination this year.     In the ED the patient was found to be influenza A positive and was started on Tamiflu.[JH1.2]     Past Medical History[JH1.1]    I have reviewed this patient's medical history and updated it with pertinent information if needed.[JH1.2]   Past Medical History:   Diagnosis Date     Dementia      Parkinson's disease (H)[JH1.3]    Paroxysmal SVT  HTN[JH1.2]     Past Surgical History[JH1.1]   I have reviewed this patient's surgical history and updated it with pertinent information if needed.  Unable to obtain due to dementia[JH1.2]    Prior to Admission Medications   Prior to Admission Medications   Prescriptions Last Dose Informant Patient Reported? Taking?   ASPIRIN EC PO 12/17/2017 at am  Yes Yes   Sig: Take 81 mg by mouth daily   DIGOXIN PO 12/15/2017  Yes Yes   Sig: Take 125 mcg by mouth five times a week Monday-Friday   HYDROCHLOROTHIAZIDE PO 12/17/2017 at am  Yes Yes   Sig: Take 25 mg by mouth daily   carbidopa-levodopa (SINEMET)  MG per tablet   Yes Yes   Sig: Take 2 tablets by mouth   diltiazem (CARTIA XT) 180 MG 24 hr capsule 12/17/2017 at am  Yes Yes   Sig: Take 180 mg by mouth daily   dorzolamide-timolol (COSOPT) 2-0.5 % ophthalmic solution 12/17/2017 at am x 1 dose  Yes Yes   Sig: Place 1 drop into both eyes 2 times daily   latanoprost (XALATAN) 0.005 % ophthalmic solution 12/16/2017 at pm  Yes Yes   Sig: Place 1 drop into both eyes At Bedtime   multivitamin, therapeutic with minerals (MULTI-VITAMIN) TABS tablet 12/17/2017 at am  Yes Yes   Sig: Take 1 tablet by mouth daily      Facility-Administered Medications: None     Allergies   No Known Allergies    Social History[JH1.1]   I have reviewed this patient's social history and updated it with pertinent information if needed.[JH1.2] He has never used smokeless  tobacco. He does not drink alcohol or use illicit drugs.[JH1.4]  Currently lives in an assisted living with his wife facility but does not have weekend care.[JH1.2]    Family History[JH1.1]   I have reviewed this patient's family history and updated it with pertinent information if needed.   Unable to obtain due to dementia[JH1.2]     Review of Systems[JH1.1]   The 10 point Review of Systems is negative other than noted in the HPI[JH1.2]    Physical Exam   Temp: 98.1  F (36.7  C) Temp src: Oral BP: 144/61   Heart Rate: 68 Resp: 14 SpO2: 99 % O2 Device: None (Room air)    Vital Signs with Ranges  Temp:  [98.1  F (36.7  C)] 98.1  F (36.7  C)  Heart Rate:  [68] 68  Resp:  [14] 14  BP: (131-144)/(51-61) 144/61  SpO2:  [99 %] 99 %  135 lbs 0 oz    Constitutional: Awake, alert, cooperative, no apparent distress.  Eyes: Conjunctiva and pupils examined and normal.  HEENT: Moist mucous membranes, normal dentition.  Respiratory:[JH1.1] Poor respiratory effort but c[JH1.2]lear to auscultation bilaterally, no crackles or wheezing.  Cardiovascular: Regular rate and rhythm, normal S1 and S2, and no murmur noted.  GI: Soft, non-distended, non-tender, normal bowel sounds.  Lymph/Hematologic: No a[JH1.1]bnormal bruising[JH1.2]  Skin: No rashes, no cyanosis, no edema.  Musculoskeletal: No joint swelling, erythema or tenderness.  Neurologic:[JH1.1] 4/5 strength to all 4 extremities[JH1.2]   Psychiatric: Alert, no obvious anxiety or depression.    Data   Data reviewed today:  I personally reviewed[JH1.1]   CXR:  No obvious infiltrates.  No cardiomegaly.  No pleural effusions  EKG:  Sinus bradycardia.  Artifact present.  No obvious ST elevations.[JH1.2]    Recent Labs  Lab 12/17/17  1420   WBC 6.5   HGB 12.5*   MCV 96         POTASSIUM 3.8   CHLORIDE 101   CO2 29   BUN 47*   CR 1.48*   ANIONGAP 6   DON 8.6   GLC 83   ALBUMIN 3.4   PROTTOTAL 8.2   BILITOTAL 0.7   ALKPHOS 46   ALT 10   AST 46*       Imaging:  Recent Results  (from the past 24 hour(s))   XR Chest 2 Views    Narrative    CHEST TWO VIEWS   12/17/2017 2:43 PM     HISTORY: Shortness of breath.     COMPARISON: 2/8/2017.    FINDINGS: Negative chest. No interval change.      Impression    IMPRESSION: Negative.[JH1.1]         Revision History        User Key Date/Time User Provider Type Action    > JH1.4 12/17/2017  4:50 PM Juan Luis Hall, DO Physician Sign     JH1.3 12/17/2017  4:18 PM Juan Luis Hall,  Physician      JH1.2 12/17/2017  4:11 PM Juan Luis Hall, DO Physician      JH1.1 12/17/2017  3:48 PM Juan Luis Hall,  Physician                   Discharge Summaries     No notes of this type exist for this encounter.      Consult Notes     No notes of this type exist for this encounter.         Progress Notes - Physician (Notes for yesterday and today)      ED Provider Notes by Declan Robles MD at 12/17/2017 12:25 PM     Author:  Declan Robles MD Service:  Emergency Medicine Author Type:  Physician    Filed:  12/21/2017  1:41 AM Date of Service:  12/17/2017 12:25 PM Creation Time:  12/17/2017  2:07 PM    Status:  Signed :  Declan Robles MD (Physician)           History     Chief Complaint:  Generalized weakness    HPI   History is limited due to the condition of the patient. History is provided by the patient's nephew.     Loco Hawkins is a 89 year old male who presents with his nephew to the ED for evaluation of generalized weakness. The patient's nephew reports that his wife is recovering from a recent heart valve replacement 4 weeks ago and thus the patient has been caring for himself since then. He does live in an assisted living facility where there are health care aides available at all times, however, there are no nurses this weekend. A couple days ago he began to develop a cold that his wife previously had. Today, his nephew reports that he was unable to stand up with his walker at all whereas, typically he is able  "to do this with slight difficulty. The patient does note that the back of his left leg has become increasingly painful while transferring from his chair. He denies any nausea, vomiting, diarrhea, constipation, abdominal pain, recent fall/trauma, headache, urinary problems, or any other symptoms.    Allergies:  No known drug allergies    Medications:    Colace     Past Medical History:    Dementia  Parkinson's disease  iamSPRAIN rotator cuff    Past Surgical History:    History reviewed. No pertinent surgical history.    Family History:    History reviewed. No pertinent family history.     Social History:  Smoking status: Unknown  Alcohol use: No  Marital Status:       Review of Systems   Constitutional: Positive for fatigue. Negative for chills, diaphoresis and fever.   HENT: Positive for congestion and rhinorrhea. Negative for sore throat.    Respiratory: Positive for cough. Negative for shortness of breath.    Gastrointestinal: Negative for abdominal pain, constipation, diarrhea, nausea and vomiting.   Genitourinary: Negative.    Musculoskeletal: Positive for myalgias (left hamstring).   Neurological: Positive for weakness. Negative for numbness and headaches.   All other systems reviewed and are negative.    Physical Exam[EO1.1]   Patient Vitals for the past 24 hrs:   BP Temp Temp src Heart Rate Resp SpO2 Height Weight   12/17/17 1530 136/59 - - - - - - -   12/17/17 1500 144/61 - - - - - - -   12/17/17 1242 131/51 98.1  F (36.7  C) Oral 68 14 99 % 1.702 m (5' 7\") 61.2 kg (135 lb)[EO1.2]     Physical Exam  GENERAL: well developed, pleasant  HEAD: atraumatic  EYES: pupils reactive, extraocular muscles intact, conjunctivae normal  ENT:  mucus membranes moist  NECK:  trachea midline, normal range of motion  RESPIRATORY: no tachypnea, breath sounds clear to auscultation   CVS: normal S1/S2, no murmurs, intact distal pulses  ABDOMEN: soft, nontender, nondistention  MUSCULOSKELETAL: no deformities  SKIN: warm and " dry, no acute rashes or ulceration  NEURO: GCS 15, cranial nerves intact, alert and oriented x3  PSYCH:  Mood/affect normal    Emergency Department Course[EO1.1]   ECG (14:51:13):  Rate 55 bpm. IN interval 172. QRS duration 82. QT/QTc 410/392. P-R-T axes -13 51 58. Sinus bradycardia. Low voltage QRS. Borderline ECG. Interpreted[EO1.3] at 1700[EO1.4] by Declan Robles MD.[EO1.3]    Imaging:[EO1.1]  Radiographic findings were communicated with the patient and family who voiced understanding of the findings.    X-ray Chest, 2 views:  IMPRESSION: Negative.  Result per radiology.[EO1.5]     Laboratory:[EO1.1]  CBC:[EO1.5] HGB 12.5 (L) o/w[EO1.6] WNL (WBC 6.5, )   CMP: BUN 47 (H), Creatinine 1.48 (H), GFR 44 (L), AST 46 (H) o/w WNL   Influenza A/B antigen: Positive for Influenza A[EO1.5]    Interventions:[EO1.1]  1613:[EO1.3] Tamiflu 30 mg capsule PO[EO1.5]    Emergency Department Course:[EO1.1]  Past medical records, nursing notes, and vitals reviewed.  1410: I performed an exam of the patient and obtained history, as documented above. GCS 15.    IV inserted and blood drawn.    The patient was sent for a x-ray while in the emergency department, findings above.[EO1.5]    Findings and plan explained to the[EO1.7] Patient and nephew[EO1.5] who consents to admission.     1520: Discussed the patient with Dr. Hall, who will admit the patient to a[EO1.7] observation[EO1.5] bed for further monitoring, evaluation, and treatment.[EO1.7]     Impression & Plan      Medical Decision Making:[EO1.1]  Loco Hawkins is a 89 year old male who presents with increased weakness with recent exposure to someone with a cough. He is now unable to ambulate due to the weakness. Broad differential is considered. Patient has Influenza A and is given Tamiflu, dose corrected for his age and Creatinine clearance. I spoke with the hospitalist regarding admission.[EO1.3]    Diagnosis:[EO1.1]    ICD-10-CM    1. Influenza A J10.1  Comprehensive metabolic panel   2. Generalized muscle weakness M62.81[EO1.8]        Disposition:[EO1.1]  Admitted to Dr. Hall.[EO1.5]      Clarissa Guzman  12/17/2017    EMERGENCY DEPARTMENT  I, Clarissa Guzman, am serving as a scribe at 2:10 PM on 12/17/2017 to document services personally performed by Declan Robles MD based on my observations and the provider's statements to me.[EO1.1]        Declan Robles MD  12/21/17 0141  [SA1.1]     Revision History        User Key Date/Time User Provider Type Action    > SA1.1 12/21/2017  1:41 AM Declan Robles MD Physician Sign     EO1.4 12/17/2017  5:02 PM Och, Clarissa Scribe Share     EO1.2 12/17/2017  4:23 PM Och, Clarissa Scribe Share     EO1.8 12/17/2017  4:21 PM Och, Clarissa Scribe      EO1.3 12/17/2017  4:20 PM Och, Clarissa Scribe      EO1.6 12/17/2017  3:51 PM Och, Clarissa Scribe Share     EO1.5 12/17/2017  3:35 PM Och, Clarissa Scribe Share     EO1.7 12/17/2017  3:20 PM Och, Clarissa Scribe Share     EO1.1 12/17/2017  2:58 PM Och, Clarissa Scribe Share            Progress Notes by Stef Pineda DO at 12/20/2017  2:09 PM     Author:  Stef Pineda DO Service:  Hospitalist Author Type:  Physician    Filed:  12/20/2017  2:19 PM Date of Service:  12/20/2017  2:09 PM Creation Time:  12/20/2017  2:09 PM    Status:  Signed :  Stef Pineda DO (Physician)         [facility]    Hospitalist Progress Note    Assessment & Plan   Loco Hawkins is a 89 year old male with parkinson's and probable dementia who was admitted on 12/17/2017 with generalized weakness, cough, and body aches. He was found to have influenza infection.    Summary:   Influenza A  Patient was influenza A positive in the ED.  This is the likely cause of the patient's acute symptoms including his generalized weakness and cough.  His vitals were normal in the ED and he was satting well on room air.  A CXR did not show any signs of infiltrate  - Tamiflu renally dosed  -  Tylenol PRN   - PT/OT/Social work consults to help with placement  - placement in TCU per therapies    Right metatarsalgia: longstanding issue, per wife has seen > 5 doctors for this. Has chronic achilles contracture, [facility].   - fu with outpatient ortho  - PT    RLE weakness: chronic per report, but it appears to be worsening as he is not really able to ambulate. Received records from his neurologist from [facility] from 1 year ago indicating that it was likely from the [facility] but also could be radicular. Had minimal disc bulging on previous MRI of the l spine done in 2013. Given the concerns for radicular involvement it would be prudent to obtain a repeat MRI to make sure there is no impingement occurring given he is not currently able to ambulate.  - would hold off on discharge today  - MRI of the l spine without contrast  - I discussed this with his nephew his POA and he is agreeable.  - urged nephew to help set up outpatient neurology appointment on discharge     Parkinson's Disease  Dementia  Patient has no formal diagnosis for dementia or more specifically Lewy Body Dementia.  - Will continue PTA Sinemet      Paroxysmal SVT   [facility] (notes in Epic).  Well controlled on Digoxin and Diltiazem.  Currently in NSR on telemetry and EKG from today showed sinus bradycardia.   - Will continue Digoxin and Diltiazem      HTN   Well controlled at this time  - can d/c his HCTZ at this point, his renal function will not handle    DVT PPX: scds      Stef Pineda, DO  Text Page  (7am to 6pm)  Interval History   Doing well, but still weak on the right especially. Unchanged. Per wife this is chronic, but worsening. Sees a neurologist at [facility].    -Data reviewed today: I reviewed all new labs and imaging results over the last 24 hours.     Physical Exam   Temp: 98.5  F (36.9  C) Temp src: Axillary BP: 109/62 Pulse: 62 Heart  Rate: 62 Resp: 16 SpO2: 97 % O2 Device: None (Room air)    Vitals:    12/17/17 1242   Weight: 61.2 kg (135 lb)     Vital Signs with Ranges  Temp:  [98.1  F (36.7  C)-98.5  F (36.9  C)] 98.5  F (36.9  C)  Pulse:  [62-65] 62  Heart Rate:  [62-64] 62  Resp:  [15-16] 16  BP: (109-111)/(51-62) 109/62  SpO2:  [96 %-98 %] 97 %  I/O last 3 completed shifts:  In: 590 [P.O.:590]  Out: 50 [Urine:50]    Constitutional: Awake, alert, cooperative, no apparent distress  Respiratory: Clear to auscultation bilaterally, no crackles or wheezing  Cardiovascular: Regular rate and rhythm, normal S1 and S2, and no murmur noted  GI: Normal bowel sounds, soft, non-distended, non-tender  Skin/Integumen: No rashes, no cyanosis, no edema  Neuro: alert and oriented x2. Right sided dorsi and plantarflexors are 2/5 and this is consistent with examination over the past few days. No sensory deficits. Muscle strength of the left foot intact, 5/5  Extremities: right foot with DP and PT pulses dopplered today. No signs of erythema, swelling, or duskiness. Minimal point tenderness on palpation of dorsal metatarsals.    Medications        aspirin EC EC tablet 81 mg  81 mg Oral Daily     latanoprost  1 drop Both Eyes At Bedtime     oseltamivir  30 mg Oral Daily     carbidopa-levodopa  2 tablet Oral TID     digoxin (LANOXIN) tablet 125 mcg  125 mcg Oral Once per day on Mon Tue Wed Thu Fri     diltiazem  180 mg Oral Daily     docusate sodium (COLACE) capsule 100 mg  100 mg Oral BID     cholecalciferol  1,000 Units Oral BID       Data     Recent Labs  Lab 12/20/17  0853 12/19/17  1725 12/19/17  0859 12/17/17  1420   WBC  --   --  5.2 6.5   HGB  --   --  11.8* 12.5*   MCV  --   --  95 96   PLT  --   --  163 199     --  138 136   POTASSIUM 4.0 4.4 3.2* 3.8   CHLORIDE 105  --  102 101   CO2 29  --  28 29   BUN 34*  --  41* 47*   CR 1.27*  --  1.50* 1.48*   ANIONGAP 5  --  8 6   DON 8.2*  --  8.0* 8.6   GLC 83  --  90 83   ALBUMIN  --   --   --  3.4    PROTTOTAL  --   --   --  8.2   BILITOTAL  --   --   --  0.7   ALKPHOS  --   --   --  46   ALT  --   --   --  10   AST  --   --   --  46*       Imaging:   No results found for this or any previous visit (from the past 24 hour(s)).[ES1.1]     Revision History        User Key Date/Time User Provider Type Action    > ES1.1 12/20/2017  2:19 PM Stef Pineda DO Physician Sign                  Procedure Notes     No notes of this type exist for this encounter.         Progress Notes - Therapies (Notes from 12/18/17 through 12/21/17)      Progress Notes by Bryanna Noel PT at 12/18/2017  3:49 PM     Author:  Bryanna Noel PT Service:  (none) Author Type:  Physical Therapist    Filed:  12/18/2017  3:49 PM Date of Service:  12/18/2017  3:49 PM Creation Time:  12/18/2017  3:49 PM    Status:  Signed :  Bryanna Noel PT (Physical Therapist)            12/18/17 1338   Quick Adds   Type of Visit Initial PT Evaluation   Living Environment   Lives With spouse  (Gissell)   Living Arrangements assisted living   Number of Stairs to Enter Home 0   Number of Stairs Within Home 0   Transportation Available family or friend will provide   Living Environment Comment Pt and his wife receive assist 3 times/week for household tasks.    Self-Care   Usual Activity Tolerance good   Current Activity Tolerance poor   Regular Exercise no   Equipment Currently Used at Home walker, rolling;wheelchair, manual   Activity/Exercise/Self-Care Comment Pt reports utilizing wheelchair for the past 3 weeks d/t R ankle pain.    Functional Level Prior   Ambulation 1-->assistive equipment   Transferring 1-->assistive equipment   Fall history within last six months no   General Information   Onset of Illness/Injury or Date of Surgery - Date 12/17/17   Referring Physician Juan Luis Hall DO   Patient/Family Goals Statement None stated.    Pertinent History of Current Problem (include personal factors and/or comorbidities  "that impact the POC) 89 y/o male admitted under observation status with influenza A. PMH including Parkinson's and probable dementia.    Precautions/Limitations fall precautions  (Droplet precautions)   General Observations Pt in supine upon arrival of therapist.    General Info Comments Ambulate with assist.    Cognitive Status Examination   Orientation person;place  (\"December 2018\")   Level of Consciousness alert   Follows Commands and Answers Questions 75% of the time;unable to follow multi-step instructions   Personal Safety and Judgment impaired   Pain Assessment   Patient Currently in Pain (R ankle pain: 8/10 )   Integumentary/Edema   Integumentary/Edema Comments Noted R ankle edema.    Posture    Posture Comments Noted forward head and shoulder posture upon sitting EOB and standing at FWW.    Range of Motion (ROM)   ROM Comment B LEs WFL, limited R ankle ROM d/t pain.    Strength   Strength Comments Not formally assessed. Noted generalized B LE weakness, at least 3/5 grossly in B LEs reflected with transfers.    Bed Mobility   Bed Mobility Comments Supine-sit requiring Petra of 2.    Transfer Skills   Transfer Comments Sit <> stand with FWW and modA of 2.    Gait   Gait Comments Pt unable to ambulate.    Balance   Balance Comments Noted fair sitting balance and poor standing balance with noted posterior lean.    Sensory Examination   Sensory Perception Comments Pt denied numbness/tingling in B LEs.    General Therapy Interventions   Planned Therapy Interventions balance training;bed mobility training;gait training;ROM;strengthening;transfer training   Clinical Impression   Criteria for Skilled Therapeutic Intervention yes, treatment indicated   PT Diagnosis Difficulty with functional mobility.    Influenced by the following impairments Generalized weakness, Decreased activity tolerance, Pain   Functional limitations due to impairments Limited functional mobility requiring assist of 2 and mechanical lift.  " "  Clinical Presentation Evolving/Changing   Clinical Presentation Rationale Based on PMH, current presentation, and social support.    Clinical Decision Making (Complexity) Low complexity   Therapy Frequency` 3 times/week   Predicted Duration of Therapy Intervention (days/wks) 5 days   Anticipated Discharge Disposition Transitional Care Facility   Risk & Benefits of therapy have been explained Yes   Patient, Family & other staff in agreement with plan of care Yes   [facility] AM-PAC  \"6 Clicks\" V.2 Basic Mobility Inpatient Short Form   1. Turning from your back to your side while in a flat bed without using bedrails? 3 - A Little   2. Moving from lying on your back to sitting on the side of a flat bed without using bedrails? 2 - A Lot   3. Moving to and from a bed to a chair (including a wheelchair)? 1 - Total   4. Standing up from a chair using your arms (e.g., wheelchair, or bedside chair)? 2 - A Lot   5. To walk in hospital room? 1 - Total   6. Climbing 3-5 steps with a railing? 1 - Total   Basic Mobility Raw Score (Score out of 24.Lower scores equate to lower levels of function) 10   Total Evaluation Time   Total Evaluation Time (Minutes) 15[JH1.1]        Revision History        User Key Date/Time User Provider Type Action    > JH1.1 12/18/2017  3:49 PM Bryanna Noel, PT Physical Therapist Sign                                                      INTERAGENCY TRANSFER FORM - LAB / IMAGING / EKG / EMG RESULTS   12/17/2017                       [place] [address] [facility]: [phone]            Unresulted Labs (24h ago through future)    Start       Ordered    12/20/17 0600  Basic metabolic panel  AM DRAW,   Routine      12/19/17 1426    Unscheduled  Potassium  (Potassium Replacement - \"Standard\" - For K levels less than 3.4 mmol/L - UU,UR,UA,RH,SH,PH,WY )  CONDITIONAL (SPECIFY),   Routine     Comments:  Obtain Potassium Level for these conditions:  *IF no potassium result within 24 " hours before initiation of order set, draw potassium level with next lab collect.    *2 HOURS AFTER last IV potassium replacement dose and 4 hours after an oral replacement dose.  *Next morning after potassium dose.     Repeat Potassium Replacement if necessary.    12/19/17 0946         Lab Results - 3 Days      Basic metabolic panel [[id number]] (Abnormal)  Resulted: 12/21/17 1007, Result status: Final result    Ordering provider: Stef Pineda, DO  12/21/17 0000 Resulting lab: [facility]    Specimen Information    Type Source Collected On   Blood  12/21/17 0913          Components       Value Reference Range Flag Lab   Sodium 138 133 - 144 mmol/L  FrStHsLb   Potassium 3.8 3.4 - 5.3 mmol/L  FrStHsLb   Chloride 107 94 - 109 mmol/L  FrStHsLb   Carbon Dioxide 24 20 - 32 mmol/L  FrStHsLb   Anion Gap 7 3 - 14 mmol/L  FrStHsLb   Glucose 87 70 - 99 mg/dL  FrStHsLb   Urea Nitrogen 40 7 - 30 mg/dL H FrStHsLb   Creatinine 1.11 0.66 - 1.25 mg/dL  FrStHsLb   GFR Estimate 61 >60 mL/min/1.7m2  FrStHsLb   Comment:  Non  GFR Calc   GFR Estimate If Black 74 >60 mL/min/1.7m2  FrStHsLb   Comment:  African American GFR Calc   Calcium 8.2 8.5 - 10.1 mg/dL L FrStHsLb            Basic metabolic panel [[id number]] (Abnormal)  Resulted: 12/20/17 0936, Result status: Final result    Ordering provider: Stef Pineda, DO  12/20/17 0002 Resulting lab: [facility]    Specimen Information    Type Source Collected On   Blood  12/20/17 0853          Components       Value Reference Range Flag Lab   Sodium 139 133 - 144 mmol/L  FrStHsLb   Potassium 4.0 3.4 - 5.3 mmol/L  FrStHsLb   Chloride 105 94 - 109 mmol/L  FrStHsLb   Carbon Dioxide 29 20 - 32 mmol/L  FrStHsLb   Anion Gap 5 3 - 14 mmol/L  FrStHsLb   Glucose 83 70 - 99 mg/dL  FrStHsLb   Urea Nitrogen 34 7 - 30 mg/dL H FrStHsLb   Creatinine 1.27 0.66 - 1.25 mg/dL H FrStHsLb   GFR Estimate 53 >60 mL/min/1.7m2 L FrStHsLb   Comment:   Non  GFR Calc   GFR Estimate If Black 64 >60 mL/min/1.7m2  FrStHsLb   Comment:  African American GFR Calc   Calcium 8.2 8.5 - 10.1 mg/dL L FrStHsLb            Potassium [[id number]]  Resulted: 12/19/17 1745, Result status: Final result    Ordering provider: Stef Pineda, DO  12/19/17 1340 Resulting lab: [facility]    Specimen Information    Type Source Collected On   Blood  12/19/17 1725          Components       Value Reference Range Flag Lab   Potassium 4.4 3.4 - 5.3 mmol/L  FrStHsLb            Basic metabolic panel [[id number]] (Abnormal)  Resulted: 12/19/17 0933, Result status: Final result    Ordering provider: Stef Pineda, DO  12/19/17 0002 Resulting lab: [facility]    Specimen Information    Type Source Collected On   Blood  12/19/17 0859          Components       Value Reference Range Flag Lab   Sodium 138 133 - 144 mmol/L  FrStHsLb   Potassium 3.2 3.4 - 5.3 mmol/L L FrStHsLb   Chloride 102 94 - 109 mmol/L  FrStHsLb   Carbon Dioxide 28 20 - 32 mmol/L  FrStHsLb   Anion Gap 8 3 - 14 mmol/L  FrStHsLb   Glucose 90 70 - 99 mg/dL  FrStHsLb   Urea Nitrogen 41 7 - 30 mg/dL H FrStHsLb   Creatinine 1.50 0.66 - 1.25 mg/dL H FrStHsLb   GFR Estimate 43 >60 mL/min/1.7m2 L FrStHsLb   Comment:  Non  GFR Calc   GFR Estimate If Black 52 >60 mL/min/1.7m2 L FrStHsLb   Comment:  African American GFR Calc   Calcium 8.0 8.5 - 10.1 mg/dL L FrStHsLb            CBC with platelets [[id number]] (Abnormal)  Resulted: 12/19/17 0917, Result status: Final result    Ordering provider: Stef Pineda, DO  12/19/17 0002 Resulting lab: [facility]    Specimen Information    Type Source Collected On   Blood  12/19/17 0859          Components       Value Reference Range Flag Lab   WBC 5.2 4.0 - 11.0 10e9/L  FrStHsLb   RBC Count 3.84 4.4 - 5.9 10e12/L L FrStHsLb   Hemoglobin 11.8 13.3 - 17.7 g/dL L FrStHsLb   Hematocrit 36.3 40.0 -  53.0 % L FrStHsLb   MCV 95 78 - 100 fl  FrStHsLb   MCH 30.7 26.5 - 33.0 pg  FrStHsLb   MCHC 32.5 31.5 - 36.5 g/dL  FrStHsLb   RDW 14.0 10.0 - 15.0 %  FrStHsLb   Platelet Count 163 150 - 450 10e9/L  FrStHsLb            Testing Performed By     Lab - Abbreviation Name Director Address Valid Date Range    14 - FrStHsLb [facility] Unknown [address] [place] [address] 05/08/15 1057 - Present               Imaging Results - 3 Days      MR Lumbar Spine w/o Contrast [[id number]]  Resulted: 12/20/17 1843, Result status: Preliminary result    Ordering provider: Stef Pineda,   12/20/17 1353 Resulted by: Wilber Cespedes MD    Performed: 12/20/17 1522 - 12/20/17 1537 Resulting lab: RADIOLOGY RESULTS    Narrative:       MR LUMBAR SPINE WITHOUT CONTRAST   12/20/2017 3:37 PM    HISTORY: Right leg weakness.    TECHNIQUE: Sagittal T1 and T2 and STIR, axial proton density and T2  images.    COMPARISON: None.    FINDINGS: Five functional lumbar vertebral segments are assumed. The  caudal thecal sac contents appear intrinsically normal. Alignment in  the sagittal plane is normal. Vertebral bone marrow signal is  unremarkable.    T12-L1: Early signal loss and subtle disc bulging with no disc  protrusion or central or foraminal stenosis. Posterior facets are  normal.    L1-L2: Signal loss without disc space narrowing. Minimal disc bulging  and no disc protrusion or central or foraminal stenosis. Posterior  facets are unremarkable.    L2-L3: Early signal loss and minimal disc bulging without disc  protrusion or central stenosis. Minimal right foraminal narrowing and  the left foramen is unremarkable. Posterior facets are normal.    L3-L4: Signal loss without disc space narrowing. Minimal  posterolateral disc bulging, right greater than left. No disc  protrusion or central or left foraminal stenosis. Minimal right  foraminal narrowing. Posterior facets show early degenerative change  on the  right.    L4-L5: Signal loss without disc space narrowing. Diffuse disc bulging  without disc protrusion or central stenosis. Very mild bilateral  foraminal narrowing. Mild posterior facet degenerative changes  bilaterally.    L5-S1: Signal loss with minimal posterior disc space narrowing. Mild  posterior disc bulging associated with annular fissuring and no acute  disc protrusion or significant central or foraminal stenosis. Minimal  posterior facet degenerative changes bilaterally.    Paraspinal soft tissues are unremarkable.      Impression:       IMPRESSION:   1. Multilevel early degenerative disc disease without acute disc  protrusion or significant central stenosis.  2. Minimal and mild foraminal narrowing at multiple levels as  described above.      XR Foot Right G/E 3 Views [[id number]]  Resulted: 12/18/17 1757, Result status: Final result    Ordering provider: Stef Pineda,   12/18/17 0956 Resulted by: Ricky Jang MD    Performed: 12/18/17 1749 - 12/18/17 1754 Resulting lab: RADIOLOGY RESULTS    Narrative:       XR FOOT RT G/E 3 VW 12/18/2017 5:54 PM    COMPARISON: None.    HISTORY: Foot pain.      Impression:       IMPRESSION: No fractures are seen in the right foot. Plantar calcaneal  spur and Achilles enthesopathy are seen.    RICKY JANG MD      Testing Performed By     Lab - Abbreviation Name Director Address Valid Date Range    104 - Rad Rslts RADIOLOGY RESULTS Unknown Unknown 02/16/05 1553 - Present            Encounter-Level Documents:     There are no encounter-level documents.      Order-Level Documents:     There are no order-level documents.

## 2017-12-17 NOTE — IP AVS SNAPSHOT
"` `           [place] [address] [facility]: [phone]                                              INTERAGENCY TRANSFER FORM - NURSING   2017                    Hospital Admission Date: 2017  CARLOS POLK   : 10/31/1921  Sex: Male        Attending Provider: Stef Pineda DO     Allergies:  No Known Allergies    Infection:  None   Service:  HOSPITALIST    Ht:  1.702 m (5' 7\")   Wt:  61.2 kg (135 lb)   Admission Wt:  61.2 kg (135 lb)    BMI:  21.14 kg/m 2   BSA:  1.7 m 2            Patient PCP Information     Provider PCP Type    Baljeet Londono MD General      Current Code Status     Date Active Code Status Order ID Comments User Context       Prior      Code Status History     Date Active Date Inactive Code Status Order ID Comments User Context    2017 10:22 AM  Full Code [id number]  Stef Pineda DO Outpatient    2017  9:56 AM 2017 10:22 AM Full Code [id number]  Stef iPneda DO Inpatient      Advance Directives        Does patient have a scanned Advance Directive/ACP document in EPIC?           Yes        Hospital Problems as of 2017              Priority Class Noted POA    Influenza A Medium  2017 Yes    Influenza Medium  2017 Yes      Non-Hospital Problems as of 2017              Priority Class Noted    iamSPRAIN ROTATOR CUFF Medium  2005      Immunizations     Name Date      Influenza (High Dose) 3 valent vaccine 17          END      ASSESSMENT     Discharge Profile Flowsheet     EXPECTED DISCHARGE     SKIN      Expected Discharge Date  17 (tcu) 17 1632   Inspection of bony prominences  Full 17 1246    DISCHARGE NEEDS ASSESSMENT     Inspection under devices  Full 17 1246    Equipment Currently Used at Home  walker, rolling;wheelchair, manual 17 1347   Skin WDL  ex 17 1246    Transportation Available  family or friend will provide 17 1347   Skin " "Color/Characteristics  bruised (ecchymotic);viet;pale 12/20/17 1246    # of Referrals Placed by CTS  Transportation;Post Acute Facilities;Senior Linkage Line 12/20/17 1201   Skin Temperature  warm 12/20/17 1246    GASTROINTESTINAL (ADULT,PEDIATRIC,OB)     Skin Moisture  dry;flaky 12/20/17 1246    GI WDL  WDL 12/20/17 1246   Skin Elasticity  slow return to original state 12/20/17 1246    All Quadrants Bowel Sounds  audible and normoactive 12/19/17 1630   Skin Integrity  bruise(s);scar(s);wound(s) 12/20/17 1246    Last Bowel Movement  12/19/17 12/19/17 1630   Additional Documentation  Wound (LDA) 12/19/17 0626    GI Signs/Symptoms  fecal incontinence 12/19/17 1630   SAFETY      Passing flatus  yes 12/19/17 1630   Safety WDL  WDL 12/20/17 1246    COMMUNICATION ASSESSMENT     Safety Factors  upper side rails raised x 2;call light in reach;bed in low position 12/20/17 1246    Patient's communication style  spoken language (English or Bilingual) 12/17/17 1249   All Alarms  alarm(s) activated and audible 12/20/17 1246                 Assessment WDL (Within Defined Limits) Definitions           Safety WDL     Effective: 09/28/15    Row Information: <b>WDL Definition:</b> Bed in low position, wheels locked; call light in reach; upper side rails up x 2; ID band on<br> <font color=\"gray\"><i>Item=AS safety wdl>>List=AS safety wdl>>Version=F14</i></font>      Skin WDL     Effective: 09/28/15    Row Information: <b>WDL Definition:</b> Warm; dry; intact; elastic; without discoloration; pressure points without redness<br> <font color=\"gray\"><i>Item=AS skin wdl>>List=AS skin wdl>>Version=F14</i></font>      Vitals     Vital Signs Flowsheet     VITAL SIGNS     Pain Management Interventions  medication offered but refused;relaxation techniques promoted 12/20/17 0031    Temp  98.5  F (36.9  C) 12/20/17 0759   Pain Intervention(s)  Medication (See eMAR);Repositioned 12/19/17 1620    Temp src  Axillary 12/20/17 0750   Response to " "Interventions  Decrease in pain 12/19/17 1338    Resp  16 12/20/17 0759   ANALGESIA SIDE EFFECTS MONITORING      Pulse  62 12/20/17 0854   Side Effects Monitoring: Respiratory Quality  R 12/19/17 1620    Heart Rate  62 12/20/17 0759   Side Effects Monitoring: Respiratory Depth  N 12/19/17 1620    Pulse/Heart Rate Source  Monitor 12/20/17 0854   Side Effects Monitoring: Sedation Level  1 12/19/17 1620    BP  109/62 12/20/17 0854   HEIGHT AND WEIGHT      BP Location  Right arm 12/20/17 0854   Height  1.702 m (5' 7\") 12/17/17 1243    OXYGEN THERAPY     Height Method  Stated 12/17/17 1243    SpO2  97 % 12/20/17 0759   Weight  61.2 kg (135 lb) 12/17/17 1243    O2 Device  None (Room air) 12/20/17 0759   BSA (Calculated - sq m)  1.7 12/17/17 1243    PAIN/COMFORT     BMI (Calculated)  21.19 12/17/17 1243    Patient Currently in Pain  no 12/20/17 1238   POSITIONING      Preferred Pain Scale  word (verbal rating pain scale) 12/20/17 0031   Body Position  turned;side-lying, right 12/20/17 0622    0-10 Pain Scale  3 12/19/17 1828   Head of Bed (HOB)  HOB at 20-30 degrees 12/20/17 1050    Word Pain Scale  2 12/20/17 0031   Positioning/Transfer Devices  in use;pillows 12/20/17 0622    Pain Location  Back 12/20/17 0031   DAILY CARE      Pain Orientation  Right 12/19/17 1620   Activity Management  activity adjusted per tolerance 12/20/17 0031    Pain Descriptors  -- (chronic) 12/20/17 0031   Activity Assistance Provided  assistance, 2 people 12/20/17 1050            Patient Lines/Drains/Airways Status    Active LINES/DRAINS/AIRWAYS     Name: Placement date: Placement time: Site: Days: Last dressing change:    Peripheral IV 12/17/17 Right Lower forearm 12/17/17   1419   Lower forearm   2     Pressure Injury 12/18/17 Posterior Buttocks small non-blanchable red area on buttocks, mepilex applied. 12/18/17   0400    2     Wound 12/18/17 Toe (Comment  which one) Scabbing wound on right 3rd toe 12/18/17   3872   Toe (Comment  which one)   " 1             Patient Lines/Drains/Airways Status    Active PICC/CVC     None            Intake/Output Detail Report     Date Intake   Output Net    Shift P.O. IV Piggyback Total Urine Total       Noc 12/18/17 2300 - 12/19/17 0659 -- -- -- -- -- 0    Day 12/19/17 0700 - 12/19/17 1459 540 -- 540 -- -- 540    Nancy 12/19/17 1500 - 12/19/17 2259 50 -- 50 50 50 0    Noc 12/19/17 2300 - 12/20/17 0659 -- -- -- -- -- 0    Day 12/20/17 0700 - 12/20/17 1459 -- -- -- -- -- 0      Last Void/BM       Most Recent Value    Urine Occurrence 1 at 12/20/2017 1050    Stool Occurrence 0 at 12/20/2017 1050      Case Management/Discharge Planning     Case Management/Discharge Planning Flowsheet     REFERRAL INFORMATION     DISCHARGE PLANNING      # of Referrals Placed by CTS  Transportation;Post Acute Facilities;Senior Linkage Line 12/20/17 1201   Transportation Available  family or friend will provide 12/18/17 1347    Post Acute Facilities  TCU 12/20/17 1201   FINAL RESOURCES      CTS Assigned to Case  Madina Pressley 12/20/17 1201   Equipment Currently Used at Home  walker, rolling;wheelchair, manual 12/18/17 1347    LIVING ENVIRONMENT     ABUSE RISK SCREEN      Lives With  spouse (Gissell) 12/18/17 1344   QUESTION TO PATIENT:  Has a member of your family or a partner(now or in the past) intimidated, hurt, manipulated, or controlled you in any way?  patient declined to answer or is unable to answer 12/17/17 1251    Living Arrangements  assisted living 12/18/17 1345   QUESTION TO PATIENT: Do you feel safe going back to the place where you are living?  patient declined to answer or is unable to answer 12/17/17 1251    COPING/STRESS     OBSERVATION: Is there reason to believe there has been maltreatment of a vulnerable adult (ie. Physical/Sexual/Emotional abuse, self neglect, lack of adequate food, shelter, medical care, or financial exploitation)?  no 12/17/17 1251    Major Change/Loss/Stressor  none 12/17/17 1928   (R) MENTAL HEALTH  SUICIDE RISK      EXPECTED DISCHARGE     Are you depressed or being treated for depression?  No 12/17/17 2040    Expected Discharge Date  12/20/17 (tcu) 12/19/17 7131

## 2017-12-17 NOTE — IP AVS SNAPSHOT
` ` Patient Information     Patient Name Sex     Loco Hawkins ([id number]) Male 10/31/1921       Room Bed    1709 [id number]      Patient Demographics     Address Phone    [address]   [place] [address] [phone] (Home)      Patient Ethnicity & Race     Ethnic Group Patient Race    American White      Emergency Contact(s)     Name Relation Home Work Mobile    Gissell Hawkins Spouse [phone]      Yeison Bolanos Relative [phone]      Documents on File        Status Date Received Description       Documents for the Patient    Insurance Card  05     Consent Form  05     Affiliate Privacy placeholder   phase3    Consent for EHR Access Received 17     External Medication Information Consent       Patient ID Received 05/15/17     [facility] Specified Other       Consent for Services/Privacy Notice - Hospital/Clinic Received 17     Privacy Notice - [place] Received 17     Insurance Card Received 05/15/17     Care Everywhere Prospective Auth Received 17     Advance Directives and Living Will Not Received  Validation of AD 2014    Advance Directives and Living Will Received 17 Health Care Directive 2014       Documents for the Encounter    CMS IM for Patient Signature       CMS RICCI for Patient Signature Received 17     Discharge Attachment   (INFLUENZA), THE FLU (ENGLISH)    Discharge Attachment   METATARSALGIA, WHAT IS (ENGLISH)      Admission Information     Attending Provider Admitting Provider Admission Type Admission Date/Time    Stef Pineda DO Steinbrueck, Eric Edward, DO Emergency 17  1354    Discharge Date Hospital Service Auth/Cert Status Service Area     Hospitalist [facility] SERVICES    Unit Room/Bed Admission Status       [address] UNIT [id number]/[id number] Admission (Confirmed)       Admission     Complaint    Influenza A, Influenza      Hospital Account     Name Acct ID Class  Status Primary Coverage    Loco Hawkins [id number] Inpatient Open MEDICARE - MEDICARE FOR HB SUPPLEMENT            Guarantor Account (for Hospital Account #[id number])     Name Relation to Pt Service Area Active? Acct Type    Loco Hawkins  FCS Yes Personal/Family    Address Phone          [address] [place] [address] [phone](H)              Coverage Information (for Hospital Account #[id number])     1. MEDICARE/MEDICARE FOR HB SUPPLEMENT     F/O Payor/Plan Precert #    MEDICARE/MEDICARE FOR HB SUPPLEMENT     Subscriber Subscriber #    Loco Hawkins [id number]    Address Phone    [employer]  [address]  [place] [address] [phone]          2. [employer] PLAN     F/O Payor/Plan Precert #    [employer]AN     Subscriber Subscriber #    Loco Hawkins [id number]    Address Phone    [address]  [place] [address] [phone]

## 2017-12-17 NOTE — IP AVS SNAPSHOT
"    [place] [address] [facility] UNIT: [phone]                                              INTERAGENCY TRANSFER FORM - LAB / IMAGING / EKG / EMG RESULTS   2017                    Hospital Admission Date: 2017  ACRLOS POLK   : 10/31/1921  Sex: Male        Attending Provider: Stef Pineda DO     Allergies:  No Known Allergies    Infection:  None   Service:  HOSPITALIST    Ht:  1.702 m (5' 7\")   Wt:  61.2 kg (135 lb)   Admission Wt:  61.2 kg (135 lb)    BMI:  21.14 kg/m 2   BSA:  1.7 m 2            Patient PCP Information     Provider PCP Type    Baljeet Londono MD General         Lab Results - 3 Days      Basic metabolic panel [[id number]] (Abnormal)  Resulted: 17 1007, Result status: Final result    Ordering provider: Stef Pineda DO  17 0000 Resulting lab: [facility]    Specimen Information    Type Source Collected On   Blood  17 0913          Components       Value Reference Range Flag Lab   Sodium 138 133 - 144 mmol/L  FrStHsLb   Potassium 3.8 3.4 - 5.3 mmol/L  FrStHsLb   Chloride 107 94 - 109 mmol/L  FrStHsLb   Carbon Dioxide 24 20 - 32 mmol/L  FrStHsLb   Anion Gap 7 3 - 14 mmol/L  FrStHsLb   Glucose 87 70 - 99 mg/dL  FrStHsLb   Urea Nitrogen 40 7 - 30 mg/dL H FrStHsLb   Creatinine 1.11 0.66 - 1.25 mg/dL  FrStHsLb   GFR Estimate 61 >60 mL/min/1.7m2  FrStHsLb   Comment:  Non  GFR Calc   GFR Estimate If Black 74 >60 mL/min/1.7m2  FrStHsLb   Comment:  African American GFR Calc   Calcium 8.2 8.5 - 10.1 mg/dL L FrStHsLb            Basic metabolic panel [[id number]] (Abnormal)  Resulted: 17 0936, Result status: Final result    Ordering provider: Stef Pineda DO  17 0002 Resulting lab: [facility]    Specimen Information    Type Source Collected On   Blood  17 0853          Components       Value Reference Range Flag Lab   Sodium 139 133 - 144 mmol/L  FrStHsLb "   Potassium 4.0 3.4 - 5.3 mmol/L  FrStHsLb   Chloride 105 94 - 109 mmol/L  FrStHsLb   Carbon Dioxide 29 20 - 32 mmol/L  FrStHsLb   Anion Gap 5 3 - 14 mmol/L  FrStHsLb   Glucose 83 70 - 99 mg/dL  FrStHsLb   Urea Nitrogen 34 7 - 30 mg/dL H FrStHsLb   Creatinine 1.27 0.66 - 1.25 mg/dL H FrStHsLb   GFR Estimate 53 >60 mL/min/1.7m2 L FrStHsLb   Comment:  Non  GFR Calc   GFR Estimate If Black 64 >60 mL/min/1.7m2  FrStHsLb   Comment:  African American GFR Calc   Calcium 8.2 8.5 - 10.1 mg/dL L FrStHsLb            Potassium [[id number]]  Resulted: 12/19/17 1745, Result status: Final result    Ordering provider: Stef Pineda,   12/19/17 1340 Resulting lab: [facility]    Specimen Information    Type Source Collected On   Blood  12/19/17 1725          Components       Value Reference Range Flag Lab   Potassium 4.4 3.4 - 5.3 mmol/L  FrStHsLb            Basic metabolic panel [[id number]] (Abnormal)  Resulted: 12/19/17 0933, Result status: Final result    Ordering provider: Stef Pineda,   12/19/17 0002 Resulting lab: [facility]    Specimen Information    Type Source Collected On   Blood  12/19/17 0859          Components       Value Reference Range Flag Lab   Sodium 138 133 - 144 mmol/L  FrStHsLb   Potassium 3.2 3.4 - 5.3 mmol/L L FrStHsLb   Chloride 102 94 - 109 mmol/L  FrStHsLb   Carbon Dioxide 28 20 - 32 mmol/L  FrStHsLb   Anion Gap 8 3 - 14 mmol/L  FrStHsLb   Glucose 90 70 - 99 mg/dL  FrStHsLb   Urea Nitrogen 41 7 - 30 mg/dL H FrStHsLb   Creatinine 1.50 0.66 - 1.25 mg/dL H FrStHsLb   GFR Estimate 43 >60 mL/min/1.7m2 L FrStHsLb   Comment:  Non  GFR Calc   GFR Estimate If Black 52 >60 mL/min/1.7m2 L FrStHsLb   Comment:  African American GFR Calc   Calcium 8.0 8.5 - 10.1 mg/dL L FrStHsLb            CBC with platelets [[id number]] (Abnormal)  Resulted: 12/19/17 0917, Result status: Final result    Ordering provider: Stef Pineda  "Maxi, DO  12/19/17 0002 Resulting lab: [facility]    Specimen Information    Type Source Collected On   Blood  12/19/17 0859          Components       Value Reference Range Flag Lab   WBC 5.2 4.0 - 11.0 10e9/L  FrStHsLb   RBC Count 3.84 4.4 - 5.9 10e12/L L FrStHsLb   Hemoglobin 11.8 13.3 - 17.7 g/dL L FrStHsLb   Hematocrit 36.3 40.0 - 53.0 % L FrStHsLb   MCV 95 78 - 100 fl  FrStHsLb   MCH 30.7 26.5 - 33.0 pg  FrStHsLb   MCHC 32.5 31.5 - 36.5 g/dL  FrStHsLb   RDW 14.0 10.0 - 15.0 %  FrStHsLb   Platelet Count 163 150 - 450 10e9/L  FrStHsLb            Testing Performed By     Lab - Abbreviation Name Director Address Valid Date Range    14 - FrStHsLb [facility] Unknown [address] [place] [address] 05/08/15 1057 - Present            Unresulted Labs (24h ago through future)    Start       Ordered    12/20/17 0600  Basic metabolic panel  AM DRAW,   Routine      12/19/17 1426    Unscheduled  Potassium  (Potassium Replacement - \"Standard\" - For K levels less than 3.4 mmol/L - UU,UR,UA,RH,SH,PH,WY )  CONDITIONAL (SPECIFY),   Routine     Comments:  Obtain Potassium Level for these conditions:  *IF no potassium result within 24 hours before initiation of order set, draw potassium level with next lab collect.    *2 HOURS AFTER last IV potassium replacement dose and 4 hours after an oral replacement dose.  *Next morning after potassium dose.     Repeat Potassium Replacement if necessary.    12/19/17 0946         Imaging Results - 3 Days      MR Lumbar Spine w/o Contrast [[id number]]  Resulted: 12/20/17 1843, Result status: Preliminary result    Ordering provider: Stef Pineda DO  12/20/17 1353 Resulted by: Wilber Cespedes MD    Performed: 12/20/17 1522 - 12/20/17 1537 Resulting lab: RADIOLOGY RESULTS    Narrative:       MR LUMBAR SPINE WITHOUT CONTRAST   12/20/2017 3:37 PM    HISTORY: Right leg weakness.    TECHNIQUE: Sagittal T1 and T2 and STIR, axial proton density and " T2  images.    COMPARISON: None.    FINDINGS: Five functional lumbar vertebral segments are assumed. The  caudal thecal sac contents appear intrinsically normal. Alignment in  the sagittal plane is normal. Vertebral bone marrow signal is  unremarkable.    T12-L1: Early signal loss and subtle disc bulging with no disc  protrusion or central or foraminal stenosis. Posterior facets are  normal.    L1-L2: Signal loss without disc space narrowing. Minimal disc bulging  and no disc protrusion or central or foraminal stenosis. Posterior  facets are unremarkable.    L2-L3: Early signal loss and minimal disc bulging without disc  protrusion or central stenosis. Minimal right foraminal narrowing and  the left foramen is unremarkable. Posterior facets are normal.    L3-L4: Signal loss without disc space narrowing. Minimal  posterolateral disc bulging, right greater than left. No disc  protrusion or central or left foraminal stenosis. Minimal right  foraminal narrowing. Posterior facets show early degenerative change  on the right.    L4-L5: Signal loss without disc space narrowing. Diffuse disc bulging  without disc protrusion or central stenosis. Very mild bilateral  foraminal narrowing. Mild posterior facet degenerative changes  bilaterally.    L5-S1: Signal loss with minimal posterior disc space narrowing. Mild  posterior disc bulging associated with annular fissuring and no acute  disc protrusion or significant central or foraminal stenosis. Minimal  posterior facet degenerative changes bilaterally.    Paraspinal soft tissues are unremarkable.      Impression:       IMPRESSION:   1. Multilevel early degenerative disc disease without acute disc  protrusion or significant central stenosis.  2. Minimal and mild foraminal narrowing at multiple levels as  described above.      XR Foot Right G/E 3 Views [[id number]]  Resulted: 12/18/17 1757, Result status: Final result    Ordering provider: Stef Pineda DO  12/18/17  0956 Resulted by: Ricky Jang MD    Performed: 12/18/17 1749 - 12/18/17 1754 Resulting lab: RADIOLOGY RESULTS    Narrative:       XR FOOT RT G/E 3 VW 12/18/2017 5:54 PM    COMPARISON: None.    HISTORY: Foot pain.      Impression:       IMPRESSION: No fractures are seen in the right foot. Plantar calcaneal  spur and Achilles enthesopathy are seen.    RICKY JANG MD      Testing Performed By     Lab - Abbreviation Name Director Address Valid Date Range    104 - Rad Rslts RADIOLOGY RESULTS Unknown Unknown 02/16/05 1553 - Present            Encounter-Level Documents:     There are no encounter-level documents.      Order-Level Documents:     There are no order-level documents.

## 2017-12-17 NOTE — ED NOTES
"[facility]  ED Nurse Handoff Report    ED Chief complaint: Generalized Weakness (cold, decrease of function the last couple weeks, unable to stand byself)      ED Diagnosis:   Final diagnoses:   Influenza A   Generalized muscle weakness       Code Status: Full Code    Allergies: No Known Allergies    Activity level - Baseline/Home:  Stand with Assist    Activity Level - Current:   Stand with Assist     Needed?: No    Isolation: Yes  Infection: Not Applicable  Influenza    Bariatric?: No    Vital Signs:   Vitals:    12/17/17 1242 12/17/17 1500   BP: 131/51 144/61   Resp: 14    Temp: 98.1  F (36.7  C)    TempSrc: Oral    SpO2: 99%    Weight: 61.2 kg (135 lb)    Height: 1.702 m (5' 7\")        Cardiac Rhythm: ,        Pain level:      Is this patient confused?: No    Patient Report: Initial Complaint: Increased weakness  Focused Assessment: See ED assessment  Tests Performed: See orders  Abnormal Results: See results  Treatments provided: See orders    Family Comments: Nephew here    OBS brochure/video discussed/provided to patient: na      ED Medications:   Medications   oseltamivir (TAMIFLU) capsule 30 mg (not administered)       Drips infusing?: no      ED NURSE PHONE NUMBER: *[id number]         "

## 2017-12-17 NOTE — IP AVS SNAPSHOT
"[place] [address] MEDICAL SPECIALTY UNIT: [phone]                                              INTERAGENCY TRANSFER FORM - PHYSICIAN ORDERS   2017                    Hospital Admission Date: 2017  CARLOS POLK   : 10/31/1921  Sex: Male        Attending Provider: Stef Pineda DO     Allergies:  No Known Allergies    Infection:  None   Service:  HOSPITALIST    Ht:  1.702 m (5' 7\")   Wt:  61.2 kg (135 lb)   Admission Wt:  61.2 kg (135 lb)    BMI:  21.14 kg/m 2   BSA:  1.7 m 2            Patient PCP Information     Provider PCP Type    Baljeet Londono MD General      ED Clinical Impression     Diagnosis Description Comment Added By Time Added    Influenza A [J10.1] Influenza A [J10.1]  Declan Robles MD 2017  3:11 PM    Generalized muscle weakness [M62.81] Generalized muscle weakness [M62.81]  Declan Robles MD 2017  3:11 PM      Hospital Problems as of 2017              Priority Class Noted POA    Influenza A Medium  2017 Yes    Influenza Medium  2017 Yes      Non-Hospital Problems as of 2017              Priority Class Noted    iamSPRAIN ROTATOR CUFF Medium  2005      Code Status History     Date Active Date Inactive Code Status Order ID Comments User Context    2017 10:22 AM  Full Code [id number]  Stef Pineda DO Outpatient    2017  9:56 AM 2017 10:22 AM Full Code [id number]  Stef Pineda DO Inpatient         Medication Review      START taking        Dose / Directions Comments    oseltamivir 30 MG capsule   Commonly known as:  TAMIFLU   Indication:  Flu        Dose:  30 mg   Take 1 capsule (30 mg) by mouth daily for 3 days   Quantity:  2 capsule   Refills:  0        potassium chloride 20 MEQ Packet   Commonly known as:  KLOR-CON   Used for:  Hypokalemia        Dose:  20 mEq   20 mEq by Oral or Feeding Tube route daily for 3 days   Quantity:  3 packet   Refills:  0        senna-docusate " 8.6-50 MG per tablet   Commonly known as:  SENOKOT-S;PERICOLACE   Used for:  Generalized muscle weakness        Dose:  2 tablet   Take 2 tablets by mouth 2 times daily as needed for constipation   Quantity:  100 tablet   Refills:  0          CONTINUE these medications which have NOT CHANGED        Dose / Directions Comments    ASPIRIN EC PO        Dose:  81 mg   Take 81 mg by mouth daily   Refills:  0        CARTIA  MG 24 hr capsule   Generic drug:  diltiazem        Dose:  180 mg   Take 180 mg by mouth daily   Refills:  0        DIGOXIN PO        Dose:  125 mcg   Take 125 mcg by mouth five times a week Monday-Friday   Refills:  0        DOCUSATE SODIUM PO        Dose:  100 mg   Take 100 mg by mouth 2 times daily   Refills:  0        dorzolamide-timolol 2-0.5 % ophthalmic solution   Commonly known as:  COSOPT        Dose:  1 drop   Place 1 drop into both eyes 2 times daily   Refills:  0        latanoprost 0.005 % ophthalmic solution   Commonly known as:  XALATAN        Dose:  1 drop   Place 1 drop into both eyes At Bedtime   Refills:  0        Multi-vitamin Tabs tablet        Dose:  1 tablet   Take 1 tablet by mouth daily   Refills:  0        SINEMET  MG per tablet   Generic drug:  carbidopa-levodopa        Dose:  2 tablet   Take 2 tablets by mouth 3 times daily   Refills:  0        VITAMIN D (CHOLECALCIFEROL) PO        Dose:  1 tablet   Take 1 tablet by mouth 2 times daily (OTC: Nurse did not know dose)   Refills:  0          STOP taking     HYDROCHLOROTHIAZIDE PO                   Summary of Visit     Reason for your hospital stay       Influenza causing generalized weakness             After Care     Activity - Up with nursing assistance           Activity - Up with nursing assistance           Additional Discharge Instructions       Please follow up with your orthopedic surgeon for further treatment of the right foot pain  Will need follow up bmp in 3 days       Advance Diet as Tolerated       Follow  this diet upon discharge: Orders Placed This Encounter      Regular Diet Adult       Fall precautions           General info for SNF       Length of Stay Estimate: Short Term Care: Estimated # of Days <30  Condition at Discharge: Stable  Level of care:skilled   Rehabilitation Potential: Fair  Admission H&P remains valid and up-to-date: Yes  Recent Chemotherapy: N/A  Use Nursing Home Standing Orders: Yes       Mantoux instructions       Give two-step Mantoux (PPD) Per Facility Policy Yes             Referrals     Nutrition Services Adult IP Consult       Reason:  Elderly patient       Occupational Therapy Adult Consult       Evaluate and treat as clinically indicated.    Reason:  Generalized weakness       Physical Therapy Adult Consult       Evaluate and treat as clinically indicated.    Reason:  Generalized weaknees             Follow-Up Appointment Instructions     Future Labs/Procedures    Follow Up and recommended labs and tests     Comments:    Follow up with [facility] physician.  The following labs/tests are recommended: bmp within 3 days.      Follow-Up Appointment Instructions     Follow Up and recommended labs and tests       Follow up with [facility] physician.  The following labs/tests are recommended: bmp within 3 days.             Statement of Approval     Ordered          12/20/17 0820  I have reviewed and agree with all the recommendations and orders detailed in this document.  EFFECTIVE NOW     Approved and electronically signed by:  Stef Pineda DO           12/19/17 1024  I have reviewed and agree with all the recommendations and orders detailed in this document.  EFFECTIVE NOW     Approved and electronically signed by:  Stef Pineda DO

## 2017-12-18 ENCOUNTER — APPOINTMENT (OUTPATIENT)
Dept: PHYSICAL THERAPY | Facility: CLINIC | Age: 82
DRG: 194 | End: 2017-12-18
Attending: INTERNAL MEDICINE
Payer: MEDICARE

## 2017-12-18 ENCOUNTER — APPOINTMENT (OUTPATIENT)
Dept: GENERAL RADIOLOGY | Facility: CLINIC | Age: 82
DRG: 194 | End: 2017-12-18
Attending: HOSPITALIST
Payer: MEDICARE

## 2017-12-18 PROBLEM — J11.1 INFLUENZA: Status: ACTIVE | Noted: 2017-12-18

## 2017-12-18 PROCEDURE — 97161 PT EVAL LOW COMPLEX 20 MIN: CPT | Mod: GP | Performed by: PHYSICAL THERAPIST

## 2017-12-18 PROCEDURE — A9270 NON-COVERED ITEM OR SERVICE: HCPCS | Mod: GY | Performed by: INTERNAL MEDICINE

## 2017-12-18 PROCEDURE — 40000193 ZZH STATISTIC PT WARD VISIT: Performed by: PHYSICAL THERAPIST

## 2017-12-18 PROCEDURE — G0378 HOSPITAL OBSERVATION PER HR: HCPCS

## 2017-12-18 PROCEDURE — 99232 SBSQ HOSP IP/OBS MODERATE 35: CPT | Performed by: HOSPITALIST

## 2017-12-18 PROCEDURE — 25000132 ZZH RX MED GY IP 250 OP 250 PS 637: Mod: GY | Performed by: INTERNAL MEDICINE

## 2017-12-18 PROCEDURE — 99212 OFFICE O/P EST SF 10 MIN: CPT

## 2017-12-18 PROCEDURE — 97530 THERAPEUTIC ACTIVITIES: CPT | Mod: GP | Performed by: PHYSICAL THERAPIST

## 2017-12-18 PROCEDURE — 99207 ZZC CDG-CODE CATEGORY CHANGED: CPT | Performed by: HOSPITALIST

## 2017-12-18 PROCEDURE — 73630 X-RAY EXAM OF FOOT: CPT | Mod: RT

## 2017-12-18 PROCEDURE — 12000000 ZZH R&B MED SURG/OB

## 2017-12-18 RX ADMIN — ACETAMINOPHEN 650 MG: 325 TABLET, FILM COATED ORAL at 03:58

## 2017-12-18 RX ADMIN — OSELTAMIVIR PHOSPHATE 30 MG: 30 CAPSULE ORAL at 08:39

## 2017-12-18 RX ADMIN — VITAMIN D, TAB 1000IU (100/BT) 1000 UNITS: 25 TAB at 08:39

## 2017-12-18 RX ADMIN — CARBIDOPA AND LEVODOPA 2 TABLET: 25; 100 TABLET ORAL at 08:39

## 2017-12-18 RX ADMIN — SENNOSIDES AND DOCUSATE SODIUM 1 TABLET: 8.6; 5 TABLET ORAL at 21:18

## 2017-12-18 RX ADMIN — ACETAMINOPHEN 650 MG: 325 TABLET, FILM COATED ORAL at 11:11

## 2017-12-18 RX ADMIN — CARBIDOPA AND LEVODOPA 2 TABLET: 25; 100 TABLET ORAL at 21:17

## 2017-12-18 RX ADMIN — CARBIDOPA AND LEVODOPA 2 TABLET: 25; 100 TABLET ORAL at 16:04

## 2017-12-18 RX ADMIN — ACETAMINOPHEN 650 MG: 325 TABLET, FILM COATED ORAL at 16:04

## 2017-12-18 RX ADMIN — DOCUSATE SODIUM 100 MG: 100 CAPSULE, LIQUID FILLED ORAL at 08:39

## 2017-12-18 RX ADMIN — VITAMIN D, TAB 1000IU (100/BT) 1000 UNITS: 25 TAB at 21:17

## 2017-12-18 RX ADMIN — ACETAMINOPHEN 650 MG: 325 TABLET, FILM COATED ORAL at 21:18

## 2017-12-18 RX ADMIN — ASPIRIN 81 MG: 81 TABLET, COATED ORAL at 08:39

## 2017-12-18 RX ADMIN — DIGOXIN 125 MCG: 125 TABLET ORAL at 11:11

## 2017-12-18 RX ADMIN — LATANOPROST 1 DROP: 50 SOLUTION OPHTHALMIC at 21:17

## 2017-12-18 NOTE — PLAN OF CARE
Problem: Patient Care Overview  Goal: Plan of Care/Patient Progress Review  PT: Orders received, evaluation completed, and treatment initiated. Patient is a 89 y/o male admitted under observation status with influenza A. Patient lives with his spouse in an assisted living facility. Patient reports completing functional mobility independently with use of a walker, most recently started requiring a wheelchair for functional ambulation due to R ankle pain. Patient spouse is recovering from a recent procedure, unclear if she can provide physical assist upon return home.     Discharge Planner PT   Patient plan for discharge: Pt agreeable to TCU prior to returning home with spouse.     Current status: Patient reported R ankle pain at rest. Patient performed supine-sit requiring Petra of 2. Sit <> stand with FWW and modA of 2, noted difficulty gaining upright posture due to posterior lean. Patient unable to bear weight through R LE to advance L LE. Patient transferred to bedside chair with sling and ceiling lift.     Barriers to return to prior living situation: Ankle pain, Fall risk, Level of assist currently requiring assist of 2 and mechanical lift, unclear if patient's spouse will be nadya to provide physical assist.     Recommendations for discharge: TCU    Rationale for recommendations: Pt will benefit from TCU prior to returning home with spouse in order to increase independence and safety prior to returning home with spouse        Entered by: Bryanna Noel 12/18/2017 2:52 PM

## 2017-12-18 NOTE — PLAN OF CARE
Problem: Patient Care Overview  Goal: Plan of Care/Patient Progress Review  Outcome: No Change  Pt disoriented to time. Total care/turn and repo. Needs assistance with feeding but swallows well. Placed mepilex on small wound on buttocks and put in WOC consult. VSS on RA (ex bradycardic at times). IV SL. On droplet precautions for Influenze A. LS diminished. PRN Tylenol used x1 for R foot pain. Regular diet. [facility] with no aid but was told someone will bring them tomorrow. On obs. PT/OT/SW to follow. Nurse will cont to monitor.

## 2017-12-18 NOTE — PROGRESS NOTES
"   12/18/17 6158   Quick Adds   Type of Visit Initial PT Evaluation   Living Environment   Lives With spouse  (Gissell)   Living Arrangements assisted living   Number of Stairs to Enter Home 0   Number of Stairs Within Home 0   Transportation Available family or friend will provide   Living Environment Comment Pt and his wife receive assist 3 times/week for household tasks.    Self-Care   Usual Activity Tolerance good   Current Activity Tolerance poor   Regular Exercise no   Equipment Currently Used at Home walker, rolling;wheelchair, manual   Activity/Exercise/Self-Care Comment Pt reports utilizing wheelchair for the past 3 weeks d/t R ankle pain.    Functional Level Prior   Ambulation 1-->assistive equipment   Transferring 1-->assistive equipment   Fall history within last six months no   General Information   Onset of Illness/Injury or Date of Surgery - Date 12/17/17   Referring Physician Juan Luis Hall,    Patient/Family Goals Statement None stated.    Pertinent History of Current Problem (include personal factors and/or comorbidities that impact the POC) 89 y/o male admitted under observation status with influenza A. PMH including Parkinson's and probable dementia.    Precautions/Limitations fall precautions  (Droplet precautions)   General Observations Pt in supine upon arrival of therapist.    General Info Comments Ambulate with assist.    Cognitive Status Examination   Orientation person;place  (\"December 2018\")   Level of Consciousness alert   Follows Commands and Answers Questions 75% of the time;unable to follow multi-step instructions   Personal Safety and Judgment impaired   Pain Assessment   Patient Currently in Pain (R ankle pain: 8/10 )   Integumentary/Edema   Integumentary/Edema Comments Noted R ankle edema.    Posture    Posture Comments Noted forward head and shoulder posture upon sitting EOB and standing at FWW.    Range of Motion (ROM)   ROM Comment B LEs WFL, limited R ankle ROM d/t " "pain.    Strength   Strength Comments Not formally assessed. Noted generalized B LE weakness, at least 3/5 grossly in B LEs reflected with transfers.    Bed Mobility   Bed Mobility Comments Supine-sit requiring Petra of 2.    Transfer Skills   Transfer Comments Sit <> stand with FWW and modA of 2.    Gait   Gait Comments Pt unable to ambulate.    Balance   Balance Comments Noted fair sitting balance and poor standing balance with noted posterior lean.    Sensory Examination   Sensory Perception Comments Pt denied numbness/tingling in B LEs.    General Therapy Interventions   Planned Therapy Interventions balance training;bed mobility training;gait training;ROM;strengthening;transfer training   Clinical Impression   Criteria for Skilled Therapeutic Intervention yes, treatment indicated   PT Diagnosis Difficulty with functional mobility.    Influenced by the following impairments Generalized weakness, Decreased activity tolerance, Pain   Functional limitations due to impairments Limited functional mobility requiring assist of 2 and mechanical lift.    Clinical Presentation Evolving/Changing   Clinical Presentation Rationale Based on PMH, current presentation, and social support.    Clinical Decision Making (Complexity) Low complexity   Therapy Frequency` 3 times/week   Predicted Duration of Therapy Intervention (days/wks) 5 days   Anticipated Discharge Disposition Transitional Care Facility   Risk & Benefits of therapy have been explained Yes   Patient, Family & other staff in agreement with plan of care Yes   [facility] AM-PAC  \"6 Clicks\" V.2 Basic Mobility Inpatient Short Form   1. Turning from your back to your side while in a flat bed without using bedrails? 3 - A Little   2. Moving from lying on your back to sitting on the side of a flat bed without using bedrails? 2 - A Lot   3. Moving to and from a bed to a chair (including a wheelchair)? 1 - Total   4. Standing up from a chair using your arms (e.g., " wheelchair, or bedside chair)? 2 - A Lot   5. To walk in hospital room? 1 - Total   6. Climbing 3-5 steps with a railing? 1 - Total   Basic Mobility Raw Score (Score out of 24.Lower scores equate to lower levels of function) 10   Total Evaluation Time   Total Evaluation Time (Minutes) 15

## 2017-12-18 NOTE — PLAN OF CARE
Problem: Patient Care Overview  Goal: Plan of Care/Patient Progress Review  OT: Orders rec'd and chart reviewed. Patient is a 89 y/o male admitted under observation status with influenza A. Spoke with PT and reports pt is requiring a ceiling lift for functional transfers and will be discharged to TCU, will defer inpt OT to TCU and complete inpt OT orders.

## 2017-12-18 NOTE — PROGRESS NOTES
"WOCNurse assessment of coccyx  D:   Epidermis across coccyx, gluteal cleft, buttocks intact without erythema.  No pain with assessment  I:  Assessed area as noted above.  Discussed plan of care with pt and RN.  A:  No evidence of pressure injury to coccyx, gluteal cleft, perineal areas.   Will use PIP measures including Mepilex Sacral dressing prn.  P:   Plan of care for coccyx area  1. Clean intact skin with gentle soap and water, dry and dry again.  2. Paint with No Sting Skin Prep (#[id number]) and allow to dry thoroughly  3. Press a Mepilex  Sacral Dressing (PS#[id number])  to the area, making sure to conform nicely to skin curvatures  4. Time and date dressing change and ramy with a \"P\" for prevention.  5. Reposition pt every 1 to 2 hours when in bed and hourly when up to the chair to relieve pressure and promote perfusion to tissue  NOTE** make sure to continue to assess under the Mepilex Dressing BID and document findings.  If epidermis  opens notify CWOCN's and change dressing to \"T\" for treatment    Pressure INJURY Prevention Measures (PIP):  If pt is refusing to turn or reposition they must be educated on the  potential injury from not off loading pressure.  Then this \"educated refusal\" needs to be documented as an \"educated refusal to turn/ reposition\" and document if alert, etc.    1. Repositioning:  Pt must be repositioned for good skin health.  If pt refusing or this is not being done then the charge nurse, nurse manager and md need to be notified to help intervene and to know that there is an issue    Bed:  Reposition pt MINIMALLY every 1-2 hours in bed to relieve pressure and promote perfusion to tissue. Also try to position to get airflow to pt s backside, etc. If necessary consider use of Fluidized Positioner Pads ((000017 small/ [id number] med/ [id number] large) to help maintain pt in good offloading-position.               Use pillows in the lower back and upper thighs to support postioning but keep pillows off " "butt to minimize any pressure.    Chair:  When up to chair pt should not sit for longer than one hour total before either standing or returning to bed for at least 10 minutes, again to relieve pressure and promote perfusion to tissue.     o Pt should also sit on a chair cushion (#[id number]) when up to the chair.  o Do NOT use a donut to sit on.  This concentrates pressure in a smaller area and creates a higher potential for injury where the cushion is.   o When pt returns to bed he/she should be positioned on side  o Do not sit on a Z-Flow Pad.  This is not a chair cushion, it is for positioning  If pt is refusing to turn or reposition they must be educated on the  potential injury from not off loading pressure.  Then this \"educated refusal\" needs to be documented as an \"educated refusal to turn/ reposition\" and document if alert, etc.  2.  Patient's skin must be kept clean and dry- the areas are only clean when you see the base of the skin fold, especially the perineal area.  Moist, macerated tissue is more susceptible to injury.           Follow Incontinence Protocol found in [employer].  Dust with baby powder BID to help with chaffing, decrease warmth from friction and increase comfort.         NO BRIEFS WITH CATHETERS  3.  Follow the bed algorithm to consider a specialty mattress  4.  If able keep head of bed below 30 degrees.   5.  Follow Eb Risk recommendations  6.  Be aware of the bony prominences!    Elevate heels at all times, consider using heel lift boots, Silverio or [place]    Apply skin prep to bony prominences such as elbows, heels, hips- and consider wearing long sleeves and/or pants at al times  7.  Remember to perform full skin inspection 2 x / day- unless ordered NOT to order an area skin must be assessed.  If not able to do a full skin inspection then document full inspection along with the exception of what was not assessed.     Face to face time:  20minutes  Follow weekly and prn  "

## 2017-12-18 NOTE — PROGRESS NOTES
[facility]    Hospitalist Progress Note    Assessment & Plan   Loco Hawkins is a 89 year old male with parkinson's and probable dementia who was admitted on 12/17/2017 with generalized weakness, cough, and body aches. He was found to have influenza infection.    Summary:   Influenza A  Patient was influenza A positive in the ED.  This is the likely cause of the patient's acute symptoms including his generalized weakness and cough.  His vitals were normal in the ED and he was satting well on room air.  A CXR did not show any signs of infiltrate  - Admission under obs status  - Tamiflu 30 mg BID given CrCl  - Tylenol PRN   - PT/OT/Social work consults to help with placement  - will monitor for postviral pna     Parkinson's Disease  Dementia  Patient has no formal diagnosis for dementia or more specifically Lewy Body Dementia.  - Will continue PTA Sinemet      Paroxysmal SVT   [facility] (notes in Epic).  Well controlled on Digoxin and Diltiazem.  Currently in NSR on telemetry and EKG from today showed sinus bradycardia.   - Will continue Digoxin and Diltiazem      HTN   Well controlled at this time  - Continue PTA HCTZ      Right foot pain:  - will check an xr, but low suspicion for acute fracture    Stef Pineda, DO  Text Page  (7am to 6pm)  Interval History   States he is feeling a little better, but does not recall saying that after. Eating well    -Data reviewed today: I reviewed all new labs and imaging results over the last 24 hours. I personally reviewed the EKG tracing showing nsr, and the chest x-ray image(s) showing no infiltrate.    Physical Exam   Temp: 97.7  F (36.5  C) Temp src: Oral BP: 96/40 Pulse: 56 Heart Rate: 59 Resp: 18 SpO2: 98 % O2 Device: None (Room air)    Vitals:    12/17/17 1242   Weight: 61.2 kg (135 lb)     Vital Signs with Ranges  Temp:  [97.5  F (36.4  C)-98.1  F (36.7  C)] 97.7  F (36.5  C)  Pulse:  [56-60] 56  Heart  Rate:  [59-68] 59  Resp:  [14-18] 18  BP: ()/(40-61) 96/40  SpO2:  [98 %-99 %] 98 %  I/O last 3 completed shifts:  In: -   Out: 150 [Urine:150]    Constitutional: Awake, alert, cooperative, no apparent distress  Respiratory: Clear to auscultation bilaterally, no crackles or wheezing  Cardiovascular: Regular rate and rhythm, normal S1 and S2, and no murmur noted  GI: Normal bowel sounds, soft, non-distended, non-tender  Skin/Integumen: No rashes, no cyanosis, no edema  Neuro: alert and oriented x1. Moving all extremities  Extremities: right foot with DP and PT pulses 2+. No signs of erythema, swelling. No point tenderness. Right foot dorsiflexion and plantar flexion     Medications        aspirin EC EC tablet 81 mg  81 mg Oral Daily     hydrochlorothiazide (HYDRODIURIL) tablet 25 mg  25 mg Oral Daily     latanoprost  1 drop Both Eyes At Bedtime     oseltamivir  30 mg Oral Daily     carbidopa-levodopa  2 tablet Oral TID     digoxin (LANOXIN) tablet 125 mcg  125 mcg Oral Once per day on Mon Tue Wed Thu Fri     diltiazem  180 mg Oral Daily     docusate sodium (COLACE) capsule 100 mg  100 mg Oral BID     cholecalciferol  1,000 Units Oral BID       Data     Recent Labs  Lab 12/17/17  1420   WBC 6.5   HGB 12.5*   MCV 96         POTASSIUM 3.8   CHLORIDE 101   CO2 29   BUN 47*   CR 1.48*   ANIONGAP 6   DON 8.6   GLC 83   ALBUMIN 3.4   PROTTOTAL 8.2   BILITOTAL 0.7   ALKPHOS 46   ALT 10   AST 46*       Imaging:   Recent Results (from the past 24 hour(s))   XR Chest 2 Views    Narrative    CHEST TWO VIEWS   12/17/2017 2:43 PM     HISTORY: Shortness of breath.     COMPARISON: 2/8/2017.    FINDINGS: Negative chest. No interval change.      Impression    IMPRESSION: Negative.    AMELIA NEWELL MD

## 2017-12-18 NOTE — PHARMACY-ADMISSION MEDICATION HISTORY
Admission medication history interview status for the 12/17/2017  admission is complete. See EPIC admission navigator for prior to admission medications     Medication history source reliability:Good    Actions taken by pharmacist (provider contacted, etc):Verified all meds with patient's nurse from care facility.     Additional medication history information not noted on PTA med list :  1) Nurse did not know patient's dose of vitamin D    Medication reconciliation/reorder completed by provider prior to medication history? No    Time spent in this activity: 20 minutes    Prior to Admission medications    Medication Sig Last Dose Taking? Auth Provider   ASPIRIN EC PO Take 81 mg by mouth daily 12/17/2017 at am Yes Unknown, Entered By History   multivitamin, therapeutic with minerals (MULTI-VITAMIN) TABS tablet Take 1 tablet by mouth daily 12/17/2017 at am Yes Unknown, Entered By History   dorzolamide-timolol (COSOPT) 2-0.5 % ophthalmic solution Place 1 drop into both eyes 2 times daily 12/17/2017 at am x 1 dose Yes Unknown, Entered By History   latanoprost (XALATAN) 0.005 % ophthalmic solution Place 1 drop into both eyes At Bedtime 12/16/2017 at pm Yes Unknown, Entered By History   diltiazem (CARTIA XT) 180 MG 24 hr capsule Take 180 mg by mouth daily 12/17/2017 at am Yes Unknown, Entered By History   DIGOXIN PO Take 125 mcg by mouth five times a week Monday-Friday 12/15/2017 Yes Unknown, Entered By History   HYDROCHLOROTHIAZIDE PO Take 25 mg by mouth daily 12/17/2017 at am Yes Unknown, Entered By History   carbidopa-levodopa (SINEMET)  MG per tablet Take 2 tablets by mouth 3 times daily  12/17/2017 at am x 1 dose Yes Unknown, Entered By History   VITAMIN D, CHOLECALCIFEROL, PO Take 1 tablet by mouth 2 times daily (OTC: Nurse did not know dose) 12/17/2017 at am x 1 dose Yes Unknown, Entered By History   DOCUSATE SODIUM PO Take 100 mg by mouth 2 times daily 12/17/2017 at am x 1 dose Yes Unknown, Entered By History

## 2017-12-18 NOTE — PROVIDER NOTIFICATION
MD Notification    Notified Person:  MD    Notified Persons Name: Miriam    Notification Date/Time: 12/18/17 0903    Notification Interaction:  Web-paged Physician    Purpose of Notification: Do you want parameters for Digoxin, Diltiazem and HCTZ? BP 96/40 HR 56.   Please advise, Thank you    Orders Received:    Comments:

## 2017-12-18 NOTE — PLAN OF CARE
Problem: Patient Care Overview  Goal: Plan of Care/Patient Progress Review  Outcome: No Change  Pt came up from ED at 1900. Pt A/O to self, place and situation, confused to date/time. Hx of Parkinsons and dementia. Forgetful at times. VS stables on RA.  Pt transferred from cart, nephew states pt does not put weight on R foot therefore is having trouble getting around, uses a wheelchair. Turned/repo every 2. Blanchable redness noted on coccyx and lower spine, positioned off back on let/right side. Pt denies pain, but explained if he were to put weight/move R ankle roughly it would cause pain. Regular diet. Voiding with urinal, but also dripping in pad. IV saline locked. Abnl lab results positive for influenza A. Anticipated D/C per MD 1-2 days after consults with PT/OT. Nursing continue to monitor.

## 2017-12-18 NOTE — PLAN OF CARE
Problem: Patient Care Overview  Goal: Plan of Care/Patient Progress Review  Outcome: No Change  A+Ox2-3 disoriented to place and date. PT to assess mobility, turn/repo Q2. Tolerating regular diet. Mepilex to coccyx, clean, dry, intact. [place] bilaterally, nephew to bring HA. LS diminished in bilateral LL. C/o pain in right foot, XR scheduled. Gave tylenol and removed PCD's will reassess. Held HCTZ and diltiazem d/t soft BP 96/40 and HR 56, paged MD for parameters. Other VSS, on RA, will continue to monitor. Anticipate discharge 1-2 days per MD.

## 2017-12-19 LAB
ANION GAP SERPL CALCULATED.3IONS-SCNC: 8 MMOL/L (ref 3–14)
BUN SERPL-MCNC: 41 MG/DL (ref 7–30)
CALCIUM SERPL-MCNC: 8 MG/DL (ref 8.5–10.1)
CHLORIDE SERPL-SCNC: 102 MMOL/L (ref 94–109)
CO2 SERPL-SCNC: 28 MMOL/L (ref 20–32)
CREAT SERPL-MCNC: 1.5 MG/DL (ref 0.66–1.25)
ERYTHROCYTE [DISTWIDTH] IN BLOOD BY AUTOMATED COUNT: 14 % (ref 10–15)
GFR SERPL CREATININE-BSD FRML MDRD: 43 ML/MIN/1.7M2
GLUCOSE SERPL-MCNC: 90 MG/DL (ref 70–99)
HCT VFR BLD AUTO: 36.3 % (ref 40–53)
HGB BLD-MCNC: 11.8 G/DL (ref 13.3–17.7)
MCH RBC QN AUTO: 30.7 PG (ref 26.5–33)
MCHC RBC AUTO-ENTMCNC: 32.5 G/DL (ref 31.5–36.5)
MCV RBC AUTO: 95 FL (ref 78–100)
PLATELET # BLD AUTO: 163 10E9/L (ref 150–450)
POTASSIUM SERPL-SCNC: 3.2 MMOL/L (ref 3.4–5.3)
POTASSIUM SERPL-SCNC: 4.4 MMOL/L (ref 3.4–5.3)
RBC # BLD AUTO: 3.84 10E12/L (ref 4.4–5.9)
SODIUM SERPL-SCNC: 138 MMOL/L (ref 133–144)
WBC # BLD AUTO: 5.2 10E9/L (ref 4–11)

## 2017-12-19 PROCEDURE — 99232 SBSQ HOSP IP/OBS MODERATE 35: CPT | Performed by: HOSPITALIST

## 2017-12-19 PROCEDURE — 12000000 ZZH R&B MED SURG/OB

## 2017-12-19 PROCEDURE — 84132 ASSAY OF SERUM POTASSIUM: CPT | Performed by: HOSPITALIST

## 2017-12-19 PROCEDURE — 25000132 ZZH RX MED GY IP 250 OP 250 PS 637: Mod: GY | Performed by: HOSPITALIST

## 2017-12-19 PROCEDURE — 80048 BASIC METABOLIC PNL TOTAL CA: CPT | Performed by: HOSPITALIST

## 2017-12-19 PROCEDURE — A9270 NON-COVERED ITEM OR SERVICE: HCPCS | Mod: GY | Performed by: INTERNAL MEDICINE

## 2017-12-19 PROCEDURE — 36415 COLL VENOUS BLD VENIPUNCTURE: CPT | Performed by: HOSPITALIST

## 2017-12-19 PROCEDURE — A9270 NON-COVERED ITEM OR SERVICE: HCPCS | Mod: GY | Performed by: HOSPITALIST

## 2017-12-19 PROCEDURE — 25000132 ZZH RX MED GY IP 250 OP 250 PS 637: Mod: GY | Performed by: INTERNAL MEDICINE

## 2017-12-19 PROCEDURE — 85027 COMPLETE CBC AUTOMATED: CPT | Performed by: HOSPITALIST

## 2017-12-19 RX ORDER — POTASSIUM CHLORIDE 7.45 MG/ML
10 INJECTION INTRAVENOUS
Status: DISCONTINUED | OUTPATIENT
Start: 2017-12-19 | End: 2017-12-21 | Stop reason: HOSPADM

## 2017-12-19 RX ORDER — AMOXICILLIN 250 MG
2 CAPSULE ORAL 2 TIMES DAILY PRN
Qty: 100 TABLET
Start: 2017-12-19

## 2017-12-19 RX ORDER — OSELTAMIVIR PHOSPHATE 30 MG/1
30 CAPSULE ORAL DAILY
Qty: 2 CAPSULE | Refills: 0
Start: 2017-12-19 | End: 2017-12-22

## 2017-12-19 RX ORDER — POTASSIUM CHLORIDE 1.5 G/1.58G
20 POWDER, FOR SOLUTION ORAL DAILY
Qty: 3 PACKET | Refills: 0
Start: 2017-12-19 | End: 2017-12-22

## 2017-12-19 RX ORDER — POTASSIUM CHLORIDE 29.8 MG/ML
20 INJECTION INTRAVENOUS
Status: DISCONTINUED | OUTPATIENT
Start: 2017-12-19 | End: 2017-12-21 | Stop reason: HOSPADM

## 2017-12-19 RX ORDER — POTASSIUM CL/LIDO/0.9 % NACL 10MEQ/0.1L
10 INTRAVENOUS SOLUTION, PIGGYBACK (ML) INTRAVENOUS
Status: DISCONTINUED | OUTPATIENT
Start: 2017-12-19 | End: 2017-12-21 | Stop reason: HOSPADM

## 2017-12-19 RX ORDER — POTASSIUM CHLORIDE 1500 MG/1
20-40 TABLET, EXTENDED RELEASE ORAL
Status: DISCONTINUED | OUTPATIENT
Start: 2017-12-19 | End: 2017-12-21 | Stop reason: HOSPADM

## 2017-12-19 RX ORDER — POTASSIUM CHLORIDE 1.5 G/1.58G
20-40 POWDER, FOR SOLUTION ORAL
Status: DISCONTINUED | OUTPATIENT
Start: 2017-12-19 | End: 2017-12-21 | Stop reason: HOSPADM

## 2017-12-19 RX ADMIN — CARBIDOPA AND LEVODOPA 2 TABLET: 25; 100 TABLET ORAL at 17:09

## 2017-12-19 RX ADMIN — ACETAMINOPHEN 650 MG: 325 TABLET, FILM COATED ORAL at 10:28

## 2017-12-19 RX ADMIN — VITAMIN D, TAB 1000IU (100/BT) 1000 UNITS: 25 TAB at 10:11

## 2017-12-19 RX ADMIN — DIGOXIN 125 MCG: 125 TABLET ORAL at 10:12

## 2017-12-19 RX ADMIN — OSELTAMIVIR PHOSPHATE 30 MG: 30 CAPSULE ORAL at 10:11

## 2017-12-19 RX ADMIN — DOCUSATE SODIUM 100 MG: 100 CAPSULE, LIQUID FILLED ORAL at 10:11

## 2017-12-19 RX ADMIN — CARBIDOPA AND LEVODOPA 2 TABLET: 25; 100 TABLET ORAL at 21:27

## 2017-12-19 RX ADMIN — LATANOPROST 1 DROP: 50 SOLUTION OPHTHALMIC at 22:55

## 2017-12-19 RX ADMIN — POTASSIUM CHLORIDE 40 MEQ: 1500 TABLET, EXTENDED RELEASE ORAL at 10:28

## 2017-12-19 RX ADMIN — CARBIDOPA AND LEVODOPA 2 TABLET: 25; 100 TABLET ORAL at 10:11

## 2017-12-19 RX ADMIN — VITAMIN D, TAB 1000IU (100/BT) 1000 UNITS: 25 TAB at 21:27

## 2017-12-19 RX ADMIN — DOCUSATE SODIUM 100 MG: 100 CAPSULE, LIQUID FILLED ORAL at 21:27

## 2017-12-19 RX ADMIN — ACETAMINOPHEN 650 MG: 325 TABLET, FILM COATED ORAL at 01:56

## 2017-12-19 RX ADMIN — POTASSIUM CHLORIDE 20 MEQ: 1500 TABLET, EXTENDED RELEASE ORAL at 13:36

## 2017-12-19 RX ADMIN — ASPIRIN 81 MG: 81 TABLET, COATED ORAL at 10:12

## 2017-12-19 NOTE — PLAN OF CARE
Problem: Patient Care Overview  Goal: Plan of Care/Patient Progress Review  Outcome: Improving  Pt A&O but forgetful. VSS except for soft BP; on RA. No c/o shortness of breath. Continues on Tamiflu. K+ replaced per protocol/ recheck this evening at 1730. R foot pain managed with tylenol. Tolerating diet. Had BM today; incontinent with stool and urine. Plan for discharge to TCU tomorrow. Will continue to monitor.

## 2017-12-19 NOTE — PROGRESS NOTES
FRANCK  D:  Per care coordinator, wife would like patient to admit to [facility]CU in a private room.  She is aware the stay will be private pay however the nephew handles their finances.  Referral made to [facility].  Due to patient's Influenza A dx., prior to admitting patient, he would need to be symptom free and have had three days of  BID doses of Tamiflu.   It appears patient had one dose of 12/17 and BID since so possibly could d/c on 12/20.    Referral made to [facility] thru DOD.  Have not had a chance to speak with nephew yet.    Writer will also check with other TCU's however this is generally the community standard for TCU's, some TCU's require completion of Tamiflu before admission.

## 2017-12-19 NOTE — PLAN OF CARE
Problem: Patient Care Overview  Goal: Plan of Care/Patient Progress Review  Outcome: No Change  A&O with forgetfulness. Up with mechanical lift due to weakness. Incontinent of bowel and bladder.  Repositioned and turned Q2hrs for incontinence and prevention of pressure wounds. Wound on the top of right third toe, scabbed over. No drainage or s/s of infection noted. Pt reports painful.  No BM during shift, Lungs clear no SOB or cough noted. Pain in top of R foot. PRN Tylenol given. Will continue to observe.

## 2017-12-19 NOTE — PLAN OF CARE
Problem: Patient Care Overview  Goal: Plan of Care/Patient Progress Review  Outcome: No Change  Patient alert and oriented X2. Up with mechanical lift due to weakness.  Turn/repo Q2hrs for incontinence and prevention of pressure wounds.  Incontinent of bowel and bladder.  No BM during shift, senna given for constipation.  Lungs clear w/ no SOB or cough noted. Pain in top of R foot, Fx ruled out w/ xray, Tylenol given.

## 2017-12-19 NOTE — PROGRESS NOTES
[facility]    Hospitalist Progress Note    Assessment & Plan   Loco Hawkins is a 89 year old male with parkinson's and probable dementia who was admitted on 12/17/2017 with generalized weakness, cough, and body aches. He was found to have influenza infection.    Summary:   Influenza A  Patient was influenza A positive in the ED.  This is the likely cause of the patient's acute symptoms including his generalized weakness and cough.  His vitals were normal in the ED and he was satting well on room air.  A CXR did not show any signs of infiltrate  - Tamiflu renally dosed  - Tylenol PRN   - PT/OT/Social work consults to help with placement  - placement in TCU per therapies. Can discharge when facility available    Right metatarsalgia: longstanding issue, per wife has seen > 5 doctors for this. Has chronic achilles contracture, [facility].   - fu with outpatient ortho  - PT     Parkinson's Disease  Dementia  Patient has no formal diagnosis for dementia or more specifically Lewy Body Dementia.  - Will continue PTA Sinemet      Paroxysmal SVT   [facility] (notes in Epic).  Well controlled on Digoxin and Diltiazem.  Currently in NSR on telemetry and EKG from today showed sinus bradycardia.   - Will continue Digoxin and Diltiazem      HTN   Well controlled at this time  - can d/c his HCTZ at this point, his renal function will not handle    DVT PPX: scds      Stef Pineda, DO  Text Page  (7am to 6pm)  Interval History   States he is feeling a little better. He wants to get up and moving    -Data reviewed today: I reviewed all new labs and imaging results over the last 24 hours.     Physical Exam   Temp: 98  F (36.7  C) Temp src: Oral BP: 111/56 Pulse: 64 Heart Rate: 65 Resp: 16 SpO2: 96 % O2 Device: None (Room air)    Vitals:    12/17/17 1242   Weight: 61.2 kg (135 lb)     Vital Signs with Ranges  Temp:  [98  F (36.7  C)-98.2   F (36.8  C)] 98  F (36.7  C)  Pulse:  [63-64] 64  Heart Rate:  [61-65] 65  Resp:  [16-18] 16  BP: ()/(49-56) 111/56  SpO2:  [96 %-98 %] 96 %  I/O last 3 completed shifts:  In: -   Out: 250 [Urine:250]    Constitutional: Awake, alert, cooperative, no apparent distress  Respiratory: Clear to auscultation bilaterally, no crackles or wheezing  Cardiovascular: Regular rate and rhythm, normal S1 and S2, and no murmur noted  GI: Normal bowel sounds, soft, non-distended, non-tender  Skin/Integumen: No rashes, no cyanosis, no edema  Neuro: alert and oriented x1. Moving all extremities  Extremities: right foot with DP and PT pulses dopplered today. No signs of erythema, swelling, or duskiness. Minimal point tenderness on palpation of dorsal metatarsals.    Medications        influenza Vac Split High-Dose  0.5 mL Intramuscular Prior to discharge     aspirin EC EC tablet 81 mg  81 mg Oral Daily     latanoprost  1 drop Both Eyes At Bedtime     oseltamivir  30 mg Oral Daily     carbidopa-levodopa  2 tablet Oral TID     digoxin (LANOXIN) tablet 125 mcg  125 mcg Oral Once per day on Mon Tue Wed Thu Fri     diltiazem  180 mg Oral Daily     docusate sodium (COLACE) capsule 100 mg  100 mg Oral BID     cholecalciferol  1,000 Units Oral BID       Data     Recent Labs  Lab 12/19/17  0859 12/17/17  1420   WBC 5.2 6.5   HGB 11.8* 12.5*   MCV 95 96    199    136   POTASSIUM 3.2* 3.8   CHLORIDE 102 101   CO2 28 29   BUN 41* 47*   CR 1.50* 1.48*   ANIONGAP 8 6   DON 8.0* 8.6   GLC 90 83   ALBUMIN  --  3.4   PROTTOTAL  --  8.2   BILITOTAL  --  0.7   ALKPHOS  --  46   ALT  --  10   AST  --  46*       Imaging:   Recent Results (from the past 24 hour(s))   XR Foot Right G/E 3 Views    Narrative    XR FOOT RT G/E 3 VW 12/18/2017 5:54 PM    COMPARISON: None.    HISTORY: Foot pain.      Impression    IMPRESSION: No fractures are seen in the right foot. Plantar calcaneal  spur and Achilles enthesopathy are seen.    LAUREN BARROS,  MD

## 2017-12-19 NOTE — PROGRESS NOTES
FRANCK  D:  Per care coordinator, wife's preference for TCU  is a private room at [facility].  [facility] guidelines for Influenza is three days of Tamiflu and symptom free.  Writer zoya this will be similar at other TCU's.  Patient started on Tamiflu on 1218 thus do not expect d/c until late 12/20 or morning of 12/20.    Care Coordinator updated.

## 2017-12-20 ENCOUNTER — APPOINTMENT (OUTPATIENT)
Dept: MRI IMAGING | Facility: CLINIC | Age: 82
DRG: 194 | End: 2017-12-20
Attending: HOSPITALIST
Payer: MEDICARE

## 2017-12-20 LAB
ANION GAP SERPL CALCULATED.3IONS-SCNC: 5 MMOL/L (ref 3–14)
BUN SERPL-MCNC: 34 MG/DL (ref 7–30)
CALCIUM SERPL-MCNC: 8.2 MG/DL (ref 8.5–10.1)
CHLORIDE SERPL-SCNC: 105 MMOL/L (ref 94–109)
CO2 SERPL-SCNC: 29 MMOL/L (ref 20–32)
CREAT SERPL-MCNC: 1.27 MG/DL (ref 0.66–1.25)
GFR SERPL CREATININE-BSD FRML MDRD: 53 ML/MIN/1.7M2
GLUCOSE SERPL-MCNC: 83 MG/DL (ref 70–99)
POTASSIUM SERPL-SCNC: 4 MMOL/L (ref 3.4–5.3)
SODIUM SERPL-SCNC: 139 MMOL/L (ref 133–144)

## 2017-12-20 PROCEDURE — A9270 NON-COVERED ITEM OR SERVICE: HCPCS | Mod: GY | Performed by: HOSPITALIST

## 2017-12-20 PROCEDURE — 80048 BASIC METABOLIC PNL TOTAL CA: CPT | Performed by: HOSPITALIST

## 2017-12-20 PROCEDURE — 84132 ASSAY OF SERUM POTASSIUM: CPT | Performed by: HOSPITALIST

## 2017-12-20 PROCEDURE — 25000132 ZZH RX MED GY IP 250 OP 250 PS 637: Mod: GY | Performed by: HOSPITALIST

## 2017-12-20 PROCEDURE — 25000132 ZZH RX MED GY IP 250 OP 250 PS 637: Mod: GY | Performed by: INTERNAL MEDICINE

## 2017-12-20 PROCEDURE — 99233 SBSQ HOSP IP/OBS HIGH 50: CPT | Performed by: HOSPITALIST

## 2017-12-20 PROCEDURE — 12000000 ZZH R&B MED SURG/OB

## 2017-12-20 PROCEDURE — A9270 NON-COVERED ITEM OR SERVICE: HCPCS | Mod: GY | Performed by: INTERNAL MEDICINE

## 2017-12-20 PROCEDURE — 36415 COLL VENOUS BLD VENIPUNCTURE: CPT | Performed by: HOSPITALIST

## 2017-12-20 PROCEDURE — 72148 MRI LUMBAR SPINE W/O DYE: CPT

## 2017-12-20 PROCEDURE — 25000128 H RX IP 250 OP 636: Performed by: HOSPITALIST

## 2017-12-20 PROCEDURE — 90662 IIV NO PRSV INCREASED AG IM: CPT | Performed by: HOSPITALIST

## 2017-12-20 RX ADMIN — OSELTAMIVIR PHOSPHATE 30 MG: 30 CAPSULE ORAL at 08:51

## 2017-12-20 RX ADMIN — CARBIDOPA AND LEVODOPA 2 TABLET: 25; 100 TABLET ORAL at 08:50

## 2017-12-20 RX ADMIN — INFLUENZA A VIRUSA/MICHIGAN/45/2015 X-275 (H1N1) ANTIGEN (FORMALDEHYDE INACTIVATED), INFLUENZA A VIRUS A/HONG KONG/4801/2014 X-263B (H3N2) ANTIGEN (FORMALDEHYDE INACTIVATED), AND INFLUENZA B VIRUS B/BRISBANE/60/2008 ANTIGEN (FORMALDEHYDE INACTIVATED) 0.5 ML: 60; 60; 60 INJECTION, SUSPENSION INTRAMUSCULAR at 10:58

## 2017-12-20 RX ADMIN — LATANOPROST 1 DROP: 50 SOLUTION OPHTHALMIC at 21:07

## 2017-12-20 RX ADMIN — DOCUSATE SODIUM 100 MG: 100 CAPSULE, LIQUID FILLED ORAL at 08:51

## 2017-12-20 RX ADMIN — DOCUSATE SODIUM 100 MG: 100 CAPSULE, LIQUID FILLED ORAL at 21:07

## 2017-12-20 RX ADMIN — DILTIAZEM HYDROCHLORIDE 180 MG: 180 CAPSULE, EXTENDED RELEASE ORAL at 08:54

## 2017-12-20 RX ADMIN — ASPIRIN 81 MG: 81 TABLET, COATED ORAL at 08:51

## 2017-12-20 RX ADMIN — VITAMIN D, TAB 1000IU (100/BT) 1000 UNITS: 25 TAB at 08:51

## 2017-12-20 RX ADMIN — VITAMIN D, TAB 1000IU (100/BT) 1000 UNITS: 25 TAB at 21:07

## 2017-12-20 RX ADMIN — DIGOXIN 125 MCG: 125 TABLET ORAL at 08:54

## 2017-12-20 RX ADMIN — CARBIDOPA AND LEVODOPA 2 TABLET: 25; 100 TABLET ORAL at 17:02

## 2017-12-20 RX ADMIN — CARBIDOPA AND LEVODOPA 2 TABLET: 25; 100 TABLET ORAL at 21:07

## 2017-12-20 NOTE — DISCHARGE SUMMARY
[facility]    Discharge Summary  Hospitalist    Date of Admission:  12/17/2017  Date of Discharge:  12/20/2017  Discharging Provider: Stef Pineda DO    Discharge Diagnoses   Influenza a  Right metatarsalgia  parkinsons disease  Dementia  Paroxysmal SVT  essential htn      History of Present Illness   Loco Hawkins is an 89 year old male who presented with body aches and generalized deconditioning.    Hospital Course   Loco Hawkins was admitted on 12/17/2017.  The following problems were addressed during his hospitalization:  Summary:   Influenza A  Patient was influenza A positive in the ED.  This is the likely cause of the patient's acute symptoms including his generalized weakness and cough.  His vitals were normal in the ED and he was satting well on room air.  A CXR did not show any signs of infiltrate  - Tamiflu renally dosed  - Tylenol PRN   - PT/OT/Social work consults to help with placement  - placement in TCU per therapies.      Right metatarsalgia: longstanding issue, per wife has seen > 5 doctors for this. Has chronic achilles contracture, [facility].   - fu with outpatient ortho  - PT      Parkinson's Disease  Dementia  Patient has no formal diagnosis for dementia or more specifically Lewy Body Dementia.  - Will continue PTA Sinemet       Paroxysmal SVT   [facility] (notes in Epic).  Well controlled on Digoxin and Diltiazem.  Currently in NSR on telemetry and EKG from today showed sinus bradycardia.   - Will continue Digoxin and Diltiazem       HTN   Well controlled at this time  - can d/c his HCTZ at this point, his renal function will not handle    Active Problems:    Influenza A    Influenza      Stef Pineda DO    Significant Results and Procedures   Influenza high  Foot xr    Pending Results   These results will be followed up by none  Unresulted Labs Ordered in the Past 30 Days of this  Admission     No orders found from 10/18/2017 to 12/18/2017.          Code Status   Full Code. This was verified from the advance directive written and from his nephew. Will benefit from continued education.        Primary Care Physician   Baljeet Londono    Physical Exam   Temp: 98.5  F (36.9  C) Temp src: Axillary BP: 111/51 Pulse: 65 Heart Rate: 62 Resp: 16 SpO2: 97 % O2 Device: None (Room air)    Vitals:    12/17/17 1242   Weight: 61.2 kg (135 lb)     Vital Signs with Ranges  Temp:  [98.1  F (36.7  C)-98.5  F (36.9  C)] 98.5  F (36.9  C)  Pulse:  [65] 65  Heart Rate:  [62-65] 62  Resp:  [15-16] 16  BP: (109-111)/(51-57) 111/51  SpO2:  [96 %-98 %] 97 %  I/O last 3 completed shifts:  In: 590 [P.O.:590]  Out: 50 [Urine:50]    Constitutional: resting comfortably, no distress or sob  Eyes: Conjunctiva and pupils examined and normal.  HEENT: Moist mucous membranes, normal dentition.  Respiratory: Clear to auscultation bilaterally, no crackles or wheezing.  Cardiovascular: Regular rate and rhythm, normal S1 and S2, and no murmur noted.  GI: Soft, non-distended, non-tender, normal bowel sounds.  Lymph/Hematologic: No anterior cervical or supraclavicular adenopathy.  Skin: No rashes, no cyanosis, no edema.  Musculoskeletal: right sided achilles contracture, chronic  Neurologic: Cranial nerves 2-12 intact, normal strength and sensation.  Psychiatric: Alert, oriented to person only this AM    Discharge Disposition   Discharged to tcu  Condition at discharge: Stable    Consultations This Hospital Stay   PHYSICAL THERAPY ADULT IP CONSULT  OCCUPATIONAL THERAPY ADULT IP CONSULT  SOCIAL WORK IP CONSULT  WOUND OSTOMY CONTINENCE NURSE  IP CONSULT  CASE MANAGEMENT ADULT IP CONSULT  NUTRITION SERVICES ADULT IP CONSULT  PHYSICAL THERAPY ADULT IP CONSULT  OCCUPATIONAL THERAPY ADULT IP CONSULT    Time Spent on this Encounter   Stef MEZA, personally saw the patient today and spent less than or equal to 30 minutes  discharging this patient.    Discharge Orders     General info for SNF   Length of Stay Estimate: Short Term Care: Estimated # of Days <30  Condition at Discharge: Stable  Level of care:skilled   Rehabilitation Potential: Fair  Admission H&P remains valid and up-to-date: Yes  Recent Chemotherapy: N/A  Use Nursing Home Standing Orders: Yes     Mantoux instructions   Give two-step Mantoux (PPD) Per Facility Policy Yes     Reason for your hospital stay   Influenza causing generalized weakness     Activity - Up with nursing assistance     Additional Discharge Instructions   Please follow up with your orthopedic surgeon for further treatment of the right foot pain  Will need follow up bmp in 3 days     Activity - Up with nursing assistance     Follow Up and recommended labs and tests   Follow up with [facility] physician.  The following labs/tests are recommended: bmp within 3 days.     Full Code     Nutrition Services Adult IP Consult   Reason:  Elderly patient     Physical Therapy Adult Consult   Evaluate and treat as clinically indicated.    Reason:  Generalized weaknees     Occupational Therapy Adult Consult   Evaluate and treat as clinically indicated.    Reason:  Generalized weakness     Fall precautions     Advance Diet as Tolerated   Follow this diet upon discharge: Orders Placed This Encounter     Regular Diet Adult       Discharge Medications   Current Discharge Medication List      START taking these medications    Details   oseltamivir (TAMIFLU) 30 MG capsule Take 1 capsule (30 mg) by mouth daily for 3 days  Qty: 2 capsule, Refills: 0    Associated Diagnoses: Influenza A      senna-docusate (SENOKOT-S;PERICOLACE) 8.6-50 MG per tablet Take 2 tablets by mouth 2 times daily as needed for constipation  Qty: 100 tablet    Associated Diagnoses: Generalized muscle weakness      potassium chloride (KLOR-CON) 20 MEQ Packet 20 mEq by Oral or Feeding Tube route daily for 3 days  Qty: 3 packet, Refills: 0    Associated  Diagnoses: Hypokalemia         CONTINUE these medications which have NOT CHANGED    Details   ASPIRIN EC PO Take 81 mg by mouth daily      multivitamin, therapeutic with minerals (MULTI-VITAMIN) TABS tablet Take 1 tablet by mouth daily      dorzolamide-timolol (COSOPT) 2-0.5 % ophthalmic solution Place 1 drop into both eyes 2 times daily      latanoprost (XALATAN) 0.005 % ophthalmic solution Place 1 drop into both eyes At Bedtime      diltiazem (CARTIA XT) 180 MG 24 hr capsule Take 180 mg by mouth daily      DIGOXIN PO Take 125 mcg by mouth five times a week Monday-Friday      carbidopa-levodopa (SINEMET)  MG per tablet Take 2 tablets by mouth 3 times daily       VITAMIN D, CHOLECALCIFEROL, PO Take 1 tablet by mouth 2 times daily (OTC: Nurse did not know dose)      DOCUSATE SODIUM PO Take 100 mg by mouth 2 times daily         STOP taking these medications       HYDROCHLOROTHIAZIDE PO Comments:   Reason for Stopping:             Allergies   No Known Allergies  Data   Most Recent 3 CBC's:  Recent Labs   Lab Test  12/19/17   0859  12/17/17   1420  02/08/17   1230   WBC  5.2  6.5  8.5   HGB  11.8*  12.5*  12.5*   MCV  95  96  97   PLT  163  199  217      Most Recent 3 BMP's:  Recent Labs   Lab Test  12/19/17   1725  12/19/17   0859  12/17/17   1420  02/08/17   1230   NA   --   138  136  139   POTASSIUM  4.4  3.2*  3.8  3.1*   CHLORIDE   --   102  101  105   CO2   --   28  29  23   BUN   --   41*  47*  37*   CR   --   1.50*  1.48*  1.52*   ANIONGAP   --   8  6  11   DON   --   8.0*  8.6  8.7   GLC   --   90  83  122*     Most Recent 2 LFT's:  Recent Labs   Lab Test  12/17/17   1420   AST  46*   ALT  10   ALKPHOS  46   BILITOTAL  0.7     Most Recent INR's and Anticoagulation Dosing History:  Anticoagulation Dose History     There is no flowsheet data to display.        Most Recent 3 Troponin's:  Recent Labs   Lab Test  02/08/17   1230   TROPI  <0.015  The 99th percentile for upper reference range is 0.045 ug/L.   Troponin values in   the range of 0.045 - 0.120 ug/L may be associated with risks of adverse   clinical events.       Most Recent Cholesterol Panel:No lab results found.  Most Recent 6 Bacteria Isolates From Any Culture (See EPIC Reports for Culture Details):No lab results found.  Most Recent TSH, T4 and A1c Labs:No lab results found.  Results for orders placed or performed during the hospital encounter of 12/17/17   XR Chest 2 Views    Narrative    CHEST TWO VIEWS   12/17/2017 2:43 PM     HISTORY: Shortness of breath.     COMPARISON: 2/8/2017.    FINDINGS: Negative chest. No interval change.      Impression    IMPRESSION: Negative.    AMELIA NEWELL MD   XR Foot Right G/E 3 Views    Narrative    XR FOOT RT G/E 3 VW 12/18/2017 5:54 PM    COMPARISON: None.    HISTORY: Foot pain.      Impression    IMPRESSION: No fractures are seen in the right foot. Plantar calcaneal  spur and Achilles enthesopathy are seen.    LAUREN BARROS MD

## 2017-12-20 NOTE — PLAN OF CARE
Problem: Patient Care Overview  Goal: Plan of Care/Patient Progress Review  Outcome: No Change  VSS on RA. Chronic ongoing back pain managed w/rest and repositioning q2h. Incontinence care provided. Tolerating regular diet. Anticipating D/C to TCU

## 2017-12-20 NOTE — PLAN OF CARE
Problem: Patient Care Overview  Goal: Plan of Care/Patient Progress Review  Outcome: No Change  Pt A&O but forgetful. [place]. Up with lift. VSS; on RA. No c/o shortness of breath.  Tolerating diet. Incontinent with stool and urine. Turn every 2 hours.  Plan for discharge to TCU tomorrow. Will continue to monitor.

## 2017-12-20 NOTE — PROGRESS NOTES
[facility]    Hospitalist Progress Note    Assessment & Plan   Loco Hawkins is a 89 year old male with parkinson's and probable dementia who was admitted on 12/17/2017 with generalized weakness, cough, and body aches. He was found to have influenza infection.    Summary:   Influenza A  Patient was influenza A positive in the ED.  This is the likely cause of the patient's acute symptoms including his generalized weakness and cough.  His vitals were normal in the ED and he was satting well on room air.  A CXR did not show any signs of infiltrate  - Tamiflu renally dosed  - Tylenol PRN   - PT/OT/Social work consults to help with placement  - placement in TCU per therapies    Right metatarsalgia: longstanding issue, per wife has seen > 5 doctors for this. Has chronic achilles contracture, follow with [facility] as outpatient.   - fu with outpatient ortho  - PT    RLE weakness: chronic per report, but it appears to be worsening as he is not really able to ambulate. Received records from his neurologist from [facility] from 1 year ago indicating that it was likely from the [facility] but also could be radicular. Had minimal disc bulging on previous MRI of the l spine done in 2013. Given the concerns for radicular involvement it would be prudent to obtain a repeat MRI to make sure there is no impingement occurring given he is not currently able to ambulate.  - would hold off on discharge today  - MRI of the l spine without contrast  - I discussed this with his nephew his POA and he is agreeable.  - urged nephew to help set up outpatient neurology appointment on discharge     Parkinson's Disease  Dementia  Patient has no formal diagnosis for dementia or more specifically Lewy Body Dementia.  - Will continue PTA Sinemet      Paroxysmal SVT   [facility] (notes in Epic).  Well controlled on Digoxin and Diltiazem.  Currently in NSR on telemetry and EKG from  today showed sinus bradycardia.   - Will continue Digoxin and Diltiazem      HTN   Well controlled at this time  - can d/c his HCTZ at this point, his renal function will not handle    DVT PPX: scds      Stef EKirsten Pineda, DO  Text Page  (7am to 6pm)  Interval History   Doing well, but still weak on the right especially. Unchanged. Per wife this is chronic, but worsening. Sees a neurologist at [facility].    -Data reviewed today: I reviewed all new labs and imaging results over the last 24 hours.     Physical Exam   Temp: 98.5  F (36.9  C) Temp src: Axillary BP: 109/62 Pulse: 62 Heart Rate: 62 Resp: 16 SpO2: 97 % O2 Device: None (Room air)    Vitals:    12/17/17 1242   Weight: 61.2 kg (135 lb)     Vital Signs with Ranges  Temp:  [98.1  F (36.7  C)-98.5  F (36.9  C)] 98.5  F (36.9  C)  Pulse:  [62-65] 62  Heart Rate:  [62-64] 62  Resp:  [15-16] 16  BP: (109-111)/(51-62) 109/62  SpO2:  [96 %-98 %] 97 %  I/O last 3 completed shifts:  In: 590 [P.O.:590]  Out: 50 [Urine:50]    Constitutional: Awake, alert, cooperative, no apparent distress  Respiratory: Clear to auscultation bilaterally, no crackles or wheezing  Cardiovascular: Regular rate and rhythm, normal S1 and S2, and no murmur noted  GI: Normal bowel sounds, soft, non-distended, non-tender  Skin/Integumen: No rashes, no cyanosis, no edema  Neuro: alert and oriented x2. Right sided dorsi and plantarflexors are 2/5 and this is consistent with examination over the past few days. No sensory deficits. Muscle strength of the left foot intact, 5/5  Extremities: right foot with DP and PT pulses dopplered today. No signs of erythema, swelling, or duskiness. Minimal point tenderness on palpation of dorsal metatarsals.    Medications        aspirin EC EC tablet 81 mg  81 mg Oral Daily     latanoprost  1 drop Both Eyes At Bedtime     oseltamivir  30 mg Oral Daily     carbidopa-levodopa  2 tablet Oral TID     digoxin (LANOXIN) tablet 125 mcg  125 mcg Oral Once per day on  Mon Tue Wed Thu Fri     diltiazem  180 mg Oral Daily     docusate sodium (COLACE) capsule 100 mg  100 mg Oral BID     cholecalciferol  1,000 Units Oral BID       Data     Recent Labs  Lab 12/20/17  0853 12/19/17  1725 12/19/17  0859 12/17/17  1420   WBC  --   --  5.2 6.5   HGB  --   --  11.8* 12.5*   MCV  --   --  95 96   PLT  --   --  163 199     --  138 136   POTASSIUM 4.0 4.4 3.2* 3.8   CHLORIDE 105  --  102 101   CO2 29  --  28 29   BUN 34*  --  41* 47*   CR 1.27*  --  1.50* 1.48*   ANIONGAP 5  --  8 6   DON 8.2*  --  8.0* 8.6   GLC 83  --  90 83   ALBUMIN  --   --   --  3.4   PROTTOTAL  --   --   --  8.2   BILITOTAL  --   --   --  0.7   ALKPHOS  --   --   --  46   ALT  --   --   --  10   AST  --   --   --  46*       Imaging:   No results found for this or any previous visit (from the past 24 hour(s)).

## 2017-12-20 NOTE — PROGRESS NOTES
D: SW following for DC planning.   I: [facility] Per [facility], it is a private room and the additional fee will be waived dur to isolation. SW spoke with pt's nephew, Yeison. He is in agreement.   P: SW will follow. Anticipate DC Thursday.     ROSIO Zaidi, LGSW  [id number]    PAS-RR    D: Per DHS regulation, SW completed and submitted PAS-RR to [place] [employer] Direct Connect via the Senior [employer] Line.  PAS-RR confirmation # is : [id number]    I: SW spoke with pt and they are aware a PAS-RR has been submitted.  SW reviewed with pt that they may be contacted for a follow up appointment within 10 days of hospital discharge if their SNF stay is < 30 days.  Contact information for Senior [employer] Line was also provided.    A: Pt verbalized understanding.    P: Further questions may be directed to Senior [employer] Line at #[phone], option #4 for PAS-RR staff.

## 2017-12-20 NOTE — PLAN OF CARE
Problem: Patient Care Overview  Goal: Plan of Care/Patient Progress Review  Outcome: Improving  Orientation: Alert and oriented to person and place. Difficulty telling time and situation.   Abnl vital signs/oxygen therapy: VSS and on RA  Mobility/ambulation/fall risk: 2-person assist. Patient able to notify via call light for assistance.  Pain/treatment: No pain throughout shift.  Diet/tolerating: Regular diet, no breakfast. Ate 25% of lunch, little appetite. Pt stated pizza was bland.   Voiding: Incontinent of urine. Bowel sounds normoactive in all four quadrants. No BM throughout shift.  Tests/procedures for next shift: MRI today at 1435, lower back.   Anticipated DC: 12/21/2017. Spoke with family via telephone (Yeison and Chong) expressed concern about patient leaving hospital so early and returning to [facility].

## 2017-12-21 VITALS
BODY MASS INDEX: 21.19 KG/M2 | OXYGEN SATURATION: 98 % | HEART RATE: 59 BPM | WEIGHT: 135 LBS | RESPIRATION RATE: 16 BRPM | SYSTOLIC BLOOD PRESSURE: 120 MMHG | DIASTOLIC BLOOD PRESSURE: 53 MMHG | TEMPERATURE: 97.8 F | HEIGHT: 67 IN

## 2017-12-21 LAB
ANION GAP SERPL CALCULATED.3IONS-SCNC: 7 MMOL/L (ref 3–14)
BUN SERPL-MCNC: 40 MG/DL (ref 7–30)
CALCIUM SERPL-MCNC: 8.2 MG/DL (ref 8.5–10.1)
CHLORIDE SERPL-SCNC: 107 MMOL/L (ref 94–109)
CO2 SERPL-SCNC: 24 MMOL/L (ref 20–32)
CREAT SERPL-MCNC: 1.11 MG/DL (ref 0.66–1.25)
GFR SERPL CREATININE-BSD FRML MDRD: 61 ML/MIN/1.7M2
GLUCOSE SERPL-MCNC: 87 MG/DL (ref 70–99)
POTASSIUM SERPL-SCNC: 3.8 MMOL/L (ref 3.4–5.3)
SODIUM SERPL-SCNC: 138 MMOL/L (ref 133–144)

## 2017-12-21 PROCEDURE — 99207 ZZC CDG-CODE INCORRECT PER BILLING BASED ON TIME: CPT | Performed by: HOSPITALIST

## 2017-12-21 PROCEDURE — 36415 COLL VENOUS BLD VENIPUNCTURE: CPT | Performed by: HOSPITALIST

## 2017-12-21 PROCEDURE — 25000132 ZZH RX MED GY IP 250 OP 250 PS 637: Mod: GY | Performed by: HOSPITALIST

## 2017-12-21 PROCEDURE — 80048 BASIC METABOLIC PNL TOTAL CA: CPT | Performed by: HOSPITALIST

## 2017-12-21 PROCEDURE — A9270 NON-COVERED ITEM OR SERVICE: HCPCS | Mod: GY | Performed by: INTERNAL MEDICINE

## 2017-12-21 PROCEDURE — 25000132 ZZH RX MED GY IP 250 OP 250 PS 637: Mod: GY | Performed by: INTERNAL MEDICINE

## 2017-12-21 PROCEDURE — 99239 HOSP IP/OBS DSCHRG MGMT >30: CPT | Performed by: HOSPITALIST

## 2017-12-21 PROCEDURE — A9270 NON-COVERED ITEM OR SERVICE: HCPCS | Mod: GY | Performed by: HOSPITALIST

## 2017-12-21 RX ADMIN — CARBIDOPA AND LEVODOPA 2 TABLET: 25; 100 TABLET ORAL at 08:48

## 2017-12-21 RX ADMIN — OSELTAMIVIR PHOSPHATE 30 MG: 30 CAPSULE ORAL at 08:49

## 2017-12-21 RX ADMIN — VITAMIN D, TAB 1000IU (100/BT) 1000 UNITS: 25 TAB at 08:49

## 2017-12-21 RX ADMIN — ASPIRIN 81 MG: 81 TABLET, COATED ORAL at 08:49

## 2017-12-21 RX ADMIN — DOCUSATE SODIUM 100 MG: 100 CAPSULE, LIQUID FILLED ORAL at 08:48

## 2017-12-21 NOTE — PLAN OF CARE
Problem: Patient Care Overview  Goal: Plan of Care/Patient Progress Review  Outcome: No Change  Patient is A&Ox2-3; confused at times; redirectable.  VSS; 90's RA; denies pain.  Family expressed concern about DC; MD was notified and DC was cancelled.  MRI spine performed - results are pending.  No new c/o's.  Total cares; repositioned Q2hrs and prn.  Possible DC tomorrow.

## 2017-12-21 NOTE — PLAN OF CARE
Problem: Patient Care Overview  Goal: Plan of Care/Patient Progress Review  Outcome: Adequate for Discharge Date Met: 12/21/17  A&O to person and place. Difficulty with time and situation. 2-person transfer. Patient afebrile, VSS, on RA. C/O of right lower leg pain, otherwise no new complaints. Discharged to TCU via [facility].

## 2017-12-21 NOTE — PLAN OF CARE
Problem: Patient Care Overview  Goal: Plan of Care/Patient Progress Review  PT: Per chart review, patient noted to be discharging to TCU today. Goals not met.  Physical Therapy Discharge Summary    Reason for therapy discharge:    Discharged to transitional care facility.    Progress towards therapy goal(s). See goals on Care Plan in [facility] electronic health record for goal details.  Goals not met.  Barriers to achieving goals:   limited tolerance for therapy and discharge from facility.    Therapy recommendation(s):    Continued therapy is recommended.  Rationale/Recommendations:  TCU to maximize return to PLOF.

## 2017-12-21 NOTE — DISCHARGE SUMMARY
[facility]    Discharge Summary  Hospitalist    Date of Admission:  12/17/2017  Date of Discharge:  12/21/2017  1:29 PM  Discharging Provider: Stef Pineda,     Discharge Diagnoses   Influenza a with generalized weakness  Metatarsalgia, chronic  parkinsons disease  RLE weakness    History of Present Illness   Loco Hawkins is an 89 year old male who presented with cough and body aches found to have influenza A infection.    Hospital Course   Loco Hawkins was admitted on 12/17/2017.  The following problems were addressed during his hospitalization:  Influenza A  Patient was influenza A positive in the ED.  This is the likely cause of the patient's acute symptoms including his generalized weakness and cough.  His vitals were normal in the ED and he was satting well on room air.  A CXR did not show any signs of infiltrate  - Tamiflu renally dosed  - Tylenol PRN   - PT/OT/Social work consults to help with placement  - placement in TCU per therapies     Right metatarsalgia: longstanding issue, per wife has seen > 5 doctors for this. Has chronic achilles contracture, follow with [facility] as outpatient.   - fu with outpatient ortho  - PT     RLE weakness: chronic per report, but it appears to be worsening as he is not really able to ambulate.   Per his neurologist a lot of these changes appear to be from his parkinsons  Given concern for some radicular etiology, an MRI of the l spine was done and negative  Left leg appears spared  - discussed with his nephew to follow up with the neurologist      Parkinson's Disease  Dementia  Patient has no formal diagnosis for dementia or more specifically Lewy Body Dementia.  - Will continue PTA Sinemet   - follow up with his [facility] neurologist  - did discuss formal care plan with nephew given his advanced age and frailty, but he felt uncomfortable with decision making and would like wife of the patient to be present in the future. She  is currently ill undergoing heart valve surgery      Paroxysmal SVT   Follows with [facility] (notes in Epic).  Well controlled on Digoxin and Diltiazem.  Currently in NSR on telemetry and EKG from today showed sinus bradycardia.   - Will continue Digoxin and Diltiazem       HTN   Well controlled at this time  - can d/c his HCTZ at this point, his renal function will not handle    Active Problems:    Influenza A    Influenza      Stef Pineda, DO    Significant Results and Procedures   As above      Code Status   Full Code       Primary Care Physician   Baljeet Londono    Physical Exam   Temp: 97.8  F (36.6  C) Temp src: Oral BP: 120/53 Pulse: 59 Heart Rate: 55 Resp: 16 SpO2: 98 % O2 Device: None (Room air)    Vitals:    12/17/17 1242   Weight: 61.2 kg (135 lb)     Vital Signs with Ranges  Temp:  [97.3  F (36.3  C)-98.7  F (37.1  C)] 97.8  F (36.6  C)  Pulse:  [59] 59  Heart Rate:  [55-62] 55  Resp:  [14-18] 16  BP: (115-120)/(50-53) 120/53  SpO2:  [97 %-98 %] 98 %  I/O last 3 completed shifts:  In: 240 [P.O.:240]  Out: -     Constitutional: Awake, alert, cooperative, no apparent distress. Oriented x2  Eyes: Conjunctiva and pupils examined and normal.  HEENT: Moist mucous membranes, normal dentition.  Respiratory: Clear to auscultation bilaterally, no crackles or wheezing.  Cardiovascular: Regular rate and rhythm, normal S1 and S2, and no murmur noted.  GI: Soft, non-distended, non-tender, normal bowel sounds.  Lymph/Hematologic: No anterior cervical or supraclavicular adenopathy.  Skin: No rashes, no cyanosis, no edema.  Musculoskeletal: pain with palpation of dorsal midfoot, without appreciable lesions  Neurologic: Cranial nerves 2-12 intact, normal strength and sensation.  Psychiatric: Alert, oriented to person, place and time, no obvious anxiety or depression.    Discharge Disposition   Discharged to nursing home  Condition at discharge: Stable    Consultations This Hospital Stay    PHYSICAL THERAPY ADULT IP CONSULT  OCCUPATIONAL THERAPY ADULT IP CONSULT  SOCIAL WORK IP CONSULT  WOUND OSTOMY CONTINENCE NURSE  IP CONSULT  CASE MANAGEMENT ADULT IP CONSULT  NUTRITION SERVICES ADULT IP CONSULT  PHYSICAL THERAPY ADULT IP CONSULT  OCCUPATIONAL THERAPY ADULT IP CONSULT    Time Spent on this Encounter   IStef, personally saw the patient today and spent greater than 30 minutes discharging this patient.    Discharge Orders     General info for SNF   Length of Stay Estimate: Short Term Care: Estimated # of Days <30  Condition at Discharge: Stable  Level of care:skilled   Rehabilitation Potential: Fair  Admission H&P remains valid and up-to-date: Yes  Recent Chemotherapy: N/A  Use Nursing Home Standing Orders: Yes     Mantoux instructions   Give two-step Mantoux (PPD) Per Facility Policy Yes     Reason for your hospital stay   Influenza causing generalized weakness     Activity - Up with nursing assistance     Additional Discharge Instructions   Please follow up with your orthopedic surgeon for further treatment of the right foot pain  Will need follow up bmp in 3 days     Activity - Up with nursing assistance     Follow Up and recommended labs and tests   Follow up with skilled nursing physician.  The following labs/tests are recommended: bmp within 3 days.     Full Code     Nutrition Services Adult IP Consult   Reason:  Elderly patient     Physical Therapy Adult Consult   Evaluate and treat as clinically indicated.    Reason:  Generalized weaknees     Occupational Therapy Adult Consult   Evaluate and treat as clinically indicated.    Reason:  Generalized weakness     Fall precautions     Advance Diet as Tolerated   Follow this diet upon discharge: Orders Placed This Encounter     Regular Diet Adult       Discharge Medications   Discharge Medication List as of 12/21/2017 11:32 AM      START taking these medications    Details   oseltamivir (TAMIFLU) 30 MG capsule Take 1 capsule (30 mg) by  mouth daily for 3 days, Disp-2 capsule, R-0, No Print Out      senna-docusate (SENOKOT-S;PERICOLACE) 8.6-50 MG per tablet Take 2 tablets by mouth 2 times daily as needed for constipation, Disp-100 tablet, No Print Out      potassium chloride (KLOR-CON) 20 MEQ Packet 20 mEq by Oral or Feeding Tube route daily for 3 days, Disp-3 packet, R-0, No Print Out         CONTINUE these medications which have NOT CHANGED    Details   ASPIRIN EC PO Take 81 mg by mouth daily, Historical      multivitamin, therapeutic with minerals (MULTI-VITAMIN) TABS tablet Take 1 tablet by mouth daily, Historical      dorzolamide-timolol (COSOPT) 2-0.5 % ophthalmic solution Place 1 drop into both eyes 2 times daily, Historical      latanoprost (XALATAN) 0.005 % ophthalmic solution Place 1 drop into both eyes At Bedtime, Historical      diltiazem (CARTIA XT) 180 MG 24 hr capsule Take 180 mg by mouth daily, Historical      DIGOXIN PO Take 125 mcg by mouth five times a week Monday-Friday, Historical      carbidopa-levodopa (SINEMET)  MG per tablet Take 2 tablets by mouth 3 times daily , Historical      VITAMIN D, CHOLECALCIFEROL, PO Take 1 tablet by mouth 2 times daily (OTC: Nurse did not know dose), Historical      DOCUSATE SODIUM PO Take 100 mg by mouth 2 times daily, Historical         STOP taking these medications       HYDROCHLOROTHIAZIDE PO Comments:   Reason for Stopping:             Allergies   No Known Allergies  Data   Most Recent 3 CBC's:  Recent Labs   Lab Test  12/19/17   0859  12/17/17   1420  02/08/17   1230   WBC  5.2  6.5  8.5   HGB  11.8*  12.5*  12.5*   MCV  95  96  97   PLT  163  199  217      Most Recent 3 BMP's:  Recent Labs   Lab Test  12/21/17   0913  12/20/17   0853  12/19/17   1725  12/19/17   0859   NA  138  139   --   138   POTASSIUM  3.8  4.0  4.4  3.2*   CHLORIDE  107  105   --   102   CO2  24  29   --   28   BUN  40*  34*   --   41*   CR  1.11  1.27*   --   1.50*   ANIONGAP  7  5   --   8   DON  8.2*  8.2*   --    8.0*   GLC  87  83   --   90     Most Recent 2 LFT's:  Recent Labs   Lab Test  12/17/17   1420   AST  46*   ALT  10   ALKPHOS  46   BILITOTAL  0.7     Most Recent INR's and Anticoagulation Dosing History:  Anticoagulation Dose History     There is no flowsheet data to display.        Most Recent 3 Troponin's:  Recent Labs   Lab Test  02/08/17   1230   TROPI  <0.015  The 99th percentile for upper reference range is 0.045 ug/L.  Troponin values in   the range of 0.045 - 0.120 ug/L may be associated with risks of adverse   clinical events.       Most Recent Cholesterol Panel:No lab results found.  Most Recent 6 Bacteria Isolates From Any Culture (See EPIC Reports for Culture Details):No lab results found.  Most Recent TSH, T4 and A1c Labs:No lab results found.  Results for orders placed or performed during the hospital encounter of 12/17/17   XR Chest 2 Views    Narrative    CHEST TWO VIEWS   12/17/2017 2:43 PM     HISTORY: Shortness of breath.     COMPARISON: 2/8/2017.    FINDINGS: Negative chest. No interval change.      Impression    IMPRESSION: Negative.    AMELIA NEWELL MD   XR Foot Right G/E 3 Views    Narrative    XR FOOT RT G/E 3 VW 12/18/2017 5:54 PM    COMPARISON: None.    HISTORY: Foot pain.      Impression    IMPRESSION: No fractures are seen in the right foot. Plantar calcaneal  spur and Achilles enthesopathy are seen.    LAUREN BARROS MD   MR Lumbar Spine w/o Contrast    Narrative    MR LUMBAR SPINE WITHOUT CONTRAST   12/20/2017 3:37 PM    HISTORY: Right leg weakness.    TECHNIQUE: Sagittal T1 and T2 and STIR, axial proton density and T2  images.    COMPARISON: None.    FINDINGS: Five functional lumbar vertebral segments are assumed. The  caudal thecal sac contents appear intrinsically normal. Alignment in  the sagittal plane is normal. Vertebral bone marrow signal is  unremarkable.    T12-L1: Early signal loss and subtle disc bulging with no disc  protrusion or central or foraminal stenosis.  Posterior facets are  normal.    L1-L2: Signal loss without disc space narrowing. Minimal disc bulging  and no disc protrusion or central or foraminal stenosis. Posterior  facets are unremarkable.    L2-L3: Early signal loss and minimal disc bulging without disc  protrusion or central stenosis. Minimal right foraminal narrowing and  the left foramen is unremarkable. Posterior facets are normal.    L3-L4: Signal loss without disc space narrowing. Minimal  posterolateral disc bulging, right greater than left. No disc  protrusion or central or left foraminal stenosis. Minimal right  foraminal narrowing. Posterior facets show early degenerative change  on the right.    L4-L5: Signal loss without disc space narrowing. Diffuse disc bulging  without disc protrusion or central stenosis. Very mild bilateral  foraminal narrowing. Mild posterior facet degenerative changes  bilaterally.    L5-S1: Signal loss with minimal posterior disc space narrowing. Mild  posterior disc bulging associated with annular fissuring and no acute  disc protrusion or significant central or foraminal stenosis. Minimal  posterior facet degenerative changes bilaterally.    Paraspinal soft tissues are unremarkable.      Impression    IMPRESSION:   1. Multilevel early degenerative disc disease without acute disc  protrusion or significant central stenosis.  2. Minimal and mild foraminal narrowing at multiple levels as  described above.

## 2017-12-21 NOTE — PLAN OF CARE
Problem: Patient Care Overview  Goal: Plan of Care/Patient Progress Review  Outcome: Improving  VSS on RA. Chronic ongoing back pain managed w/rest and repositioning q2h. Incontinence care provided. Tolerating regular diet. Anticipating D/C later today to TCU if MRI stable

## 2017-12-21 NOTE — PROGRESS NOTES
D: SW following for DC planning.   I: Pt will DC today to [facility] as planned. HE BLS set for 1230. PCS faxed. Orders faxed and facility updated.   P: DC today. Yeison Dent, updated and in agreement.     Madina Pressley, ROSIO, LGSW  [id number]

## 2017-12-21 NOTE — PLAN OF CARE
Problem: Patient Care Overview  Goal: Plan of Care/Patient Progress Review  Patient is A&Ox2, disoriented to time and situation. confused at times and cooperative for cares. VSS on RA; denies pain. Total care, turn and  repositioned Q2hrs. Incontinent x 2. Droplet precaution maintained. Possible Discharge  tomorrow.

## 2018-01-26 ENCOUNTER — HOSPITAL ENCOUNTER (EMERGENCY)
Facility: CLINIC | Age: 83
Discharge: HOME | End: 2018-01-26
Attending: EMERGENCY MEDICINE | Admitting: EMERGENCY MEDICINE
Payer: MEDICARE

## 2018-01-26 VITALS
BODY MASS INDEX: 20.72 KG/M2 | HEIGHT: 67 IN | RESPIRATION RATE: 16 BRPM | OXYGEN SATURATION: 98 % | TEMPERATURE: 97.6 F | SYSTOLIC BLOOD PRESSURE: 128 MMHG | WEIGHT: 132 LBS | DIASTOLIC BLOOD PRESSURE: 42 MMHG

## 2018-01-26 DIAGNOSIS — L30.9 DERMATITIS: ICD-10-CM

## 2018-01-26 LAB
ALBUMIN SERPL-MCNC: 3 G/DL (ref 3.4–5)
ALP SERPL-CCNC: 45 U/L (ref 40–150)
ALT SERPL W P-5'-P-CCNC: 11 U/L (ref 0–70)
ANION GAP SERPL CALCULATED.3IONS-SCNC: 7 MMOL/L (ref 3–14)
AST SERPL W P-5'-P-CCNC: 39 U/L (ref 0–45)
BASOPHILS # BLD AUTO: 0 10E9/L (ref 0–0.2)
BASOPHILS NFR BLD AUTO: 0.2 %
BILIRUB SERPL-MCNC: 0.7 MG/DL (ref 0.2–1.3)
BUN SERPL-MCNC: 43 MG/DL (ref 7–30)
CALCIUM SERPL-MCNC: 8.1 MG/DL (ref 8.5–10.1)
CHLORIDE SERPL-SCNC: 111 MMOL/L (ref 94–109)
CO2 SERPL-SCNC: 24 MMOL/L (ref 20–32)
CREAT SERPL-MCNC: 1.37 MG/DL (ref 0.66–1.25)
DIFFERENTIAL METHOD BLD: ABNORMAL
DIGOXIN SERPL-MCNC: 1.2 UG/L (ref 0.5–2)
EOSINOPHIL # BLD AUTO: 1.1 10E9/L (ref 0–0.7)
EOSINOPHIL NFR BLD AUTO: 12.1 %
ERYTHROCYTE [DISTWIDTH] IN BLOOD BY AUTOMATED COUNT: 15.7 % (ref 10–15)
GFR SERPL CREATININE-BSD FRML MDRD: 48 ML/MIN/1.7M2
GLUCOSE SERPL-MCNC: 83 MG/DL (ref 70–99)
HCT VFR BLD AUTO: 33.1 % (ref 40–53)
HGB BLD-MCNC: 10.6 G/DL (ref 13.3–17.7)
IMM GRANULOCYTES # BLD: 0 10E9/L (ref 0–0.4)
IMM GRANULOCYTES NFR BLD: 0.2 %
LYMPHOCYTES # BLD AUTO: 1.6 10E9/L (ref 0.8–5.3)
LYMPHOCYTES NFR BLD AUTO: 18 %
MCH RBC QN AUTO: 30.3 PG (ref 26.5–33)
MCHC RBC AUTO-ENTMCNC: 32 G/DL (ref 31.5–36.5)
MCV RBC AUTO: 95 FL (ref 78–100)
MONOCYTES # BLD AUTO: 0.6 10E9/L (ref 0–1.3)
MONOCYTES NFR BLD AUTO: 6.4 %
NEUTROPHILS # BLD AUTO: 5.5 10E9/L (ref 1.6–8.3)
NEUTROPHILS NFR BLD AUTO: 63.1 %
NRBC # BLD AUTO: 0 10*3/UL
NRBC BLD AUTO-RTO: 0 /100
PLATELET # BLD AUTO: 274 10E9/L (ref 150–450)
POTASSIUM SERPL-SCNC: 4.4 MMOL/L (ref 3.4–5.3)
PROT SERPL-MCNC: 6.8 G/DL (ref 6.8–8.8)
RBC # BLD AUTO: 3.5 10E12/L (ref 4.4–5.9)
SODIUM SERPL-SCNC: 142 MMOL/L (ref 133–144)
WBC # BLD AUTO: 8.7 10E9/L (ref 4–11)

## 2018-01-26 PROCEDURE — 80162 ASSAY OF DIGOXIN TOTAL: CPT | Performed by: EMERGENCY MEDICINE

## 2018-01-26 PROCEDURE — 80053 COMPREHEN METABOLIC PANEL: CPT | Performed by: EMERGENCY MEDICINE

## 2018-01-26 PROCEDURE — 85025 COMPLETE CBC W/AUTO DIFF WBC: CPT | Performed by: EMERGENCY MEDICINE

## 2018-01-26 PROCEDURE — 99283 EMERGENCY DEPT VISIT LOW MDM: CPT

## 2018-01-26 RX ORDER — TRIAMCINOLONE ACETONIDE OINTMENT USP, 0.05% 0.5 MG/G
OINTMENT TOPICAL 2 TIMES DAILY
Qty: 430 G | Refills: 0 | Status: SHIPPED | OUTPATIENT
Start: 2018-01-26

## 2018-01-26 RX ORDER — PREDNISONE 20 MG/1
20 TABLET ORAL DAILY
Qty: 12 TABLET | Refills: 0 | Status: SHIPPED | OUTPATIENT
Start: 2018-01-26 | End: 2018-02-07

## 2018-01-26 RX ORDER — PREDNISONE 10 MG/1
10 TABLET ORAL DAILY
Qty: 5 TABLET | Refills: 0 | Status: SHIPPED | OUTPATIENT
Start: 2018-01-26 | End: 2018-01-31

## 2018-01-26 RX ORDER — TRIAMCINOLONE ACETONIDE 1 MG/G
CREAM TOPICAL
Qty: 450 G | Refills: 0 | Status: SHIPPED | OUTPATIENT
Start: 2018-01-26 | End: 2018-02-09

## 2018-01-26 ASSESSMENT — ENCOUNTER SYMPTOMS
JOINT SWELLING: 1
SHORTNESS OF BREATH: 0
COLOR CHANGE: 1
CHEST TIGHTNESS: 0

## 2018-01-26 NOTE — ED AVS SNAPSHOT
Emergency Department    [address] [facility] [address]    Phone:  [phone]    Fax:  [phone]                                       Loco Hawkins   MRN: [id number]    Department:   Emergency Department   Date of Visit:  1/26/2018           Patient Information     Date Of Birth          10/31/1921        Your diagnoses for this visit were:     Dermatitis        You were seen by Declan Robles MD.      Follow-up Information     Follow up with Chong Moon MD.    Specialty:  Dermatology    Contact information:    [address] [place] [address] [phone]          Discharge Instructions         What is Atopic Dermatitis?  Atopic dermatitis (also called eczema) causes chronic skin irritation. It is often found in infants, teens, and adults. This disease often runs in families (is genetic). It may also be linked to allergies, such as hay fever and sometimes asthma. Patches of skin become dry, red, itchy, and scaly. In older adults, abnormally dry skin is often called xerosis. Sometimes eczema is only on the hands or feet. It often improves when the skin is well hydrated. It gets worse when the skin is dry. You can help control symptoms by practicing good self-care. Avoid anything that causes flare-ups (such as sunburn or vigorous scratching).  Where do you have symptoms?  Atopic dermatitis symptoms can appear anywhere on the body. But in most cases they vary based on the person s age. In infants, irritation is often seen on the cheeks, chin, near the mouth, and under the eyelids. In children ages 2 through 10, skin folds, such as the backs of the knees, or in the arm crease, are most often affected. In children 11 and older and in adults, symptoms can affect many areas.  What triggers symptoms?  Symptoms flare because of many things. These include skin dryness, scratching, stress, harsh soaps, and irritants such as dust or wool. Try to avoid anything that causes  flare-ups.  Recognizing what causes flare-ups  To figure out what causes atopic dermatitis to flare, keep a list of things that seem to affect your skin. Start by filling in the spaces below. Then keep writing them down in a notebook or diary. The things that affect each person vary. So keep your own list and try to avoid your triggers.    Date Last Reviewed: 2/1/2017 2000-2017 [employer]. [address] [place] [address], [place] [address]. All rights reserved. This information is not intended as a substitute for professional medical care. Always follow your healthcare professional's instructions.      1.  Vanicream (Target or similar place) apply twice a day to affected areas  2.  Triamcinolone ointment twice a day  3.  Prednisone taper for 2 weeks  4.  Follow up with dermatology this week  5.  Return if worse    24 Hour Appointment Hotline       To make an appointment at any [facility] clinic, call [phone] ([phone]). If you don't have a family doctor or clinic, we will help you find one. [facility] clinics are conveniently located to serve the needs of you and your family.             Review of your medicines      START taking        Dose / Directions Last dose taken    * predniSONE 20 MG tablet   Commonly known as:  DELTASONE   Dose:  20 mg   Quantity:  12 tablet        Take 1 tablet (20 mg) by mouth daily for 12 doses   Refills:  0        * predniSONE 10 MG tablet   Commonly known as:  DELTASONE   Dose:  10 mg   Quantity:  5 tablet        Take 1 tablet (10 mg) by mouth daily for 5 doses   Refills:  0        * triamcinolone 0.1 % cream   Commonly known as:  KENALOG   Quantity:  450 g        450 grams   Refills:  0        * triamcinolone acetonide 0.05 % Oint   Quantity:  430 g        Externally apply topically 2 times daily   Refills:  0        * Notice:  This list has 4 medication(s) that are the same as other medications prescribed for you. Read the directions carefully, and ask your doctor  or other care provider to review them with you.      Our records show that you are taking the medicines listed below. If these are incorrect, please call your family doctor or clinic.        Dose / Directions Last dose taken    ASPIRIN EC PO   Dose:  81 mg        Take 81 mg by mouth daily   Refills:  0        CARTIA  MG 24 hr capsule   Dose:  180 mg   Generic drug:  diltiazem        Take 180 mg by mouth daily   Refills:  0        DIGOXIN PO   Dose:  125 mcg        Take 125 mcg by mouth five times a week Monday-Friday   Refills:  0        DOCUSATE SODIUM PO   Dose:  100 mg        Take 100 mg by mouth 2 times daily   Refills:  0        dorzolamide-timolol 2-0.5 % ophthalmic solution   Commonly known as:  COSOPT   Dose:  1 drop        Place 1 drop into both eyes 2 times daily   Refills:  0        latanoprost 0.005 % ophthalmic solution   Commonly known as:  XALATAN   Dose:  1 drop        Place 1 drop into both eyes At Bedtime   Refills:  0        Multi-vitamin Tabs tablet   Dose:  1 tablet        Take 1 tablet by mouth daily   Refills:  0        senna-docusate 8.6-50 MG per tablet   Commonly known as:  SENOKOT-S;PERICOLACE   Dose:  2 tablet   Quantity:  100 tablet        Take 2 tablets by mouth 2 times daily as needed for constipation   Refills:  0        SINEMET  MG per tablet   Dose:  2 tablet   Generic drug:  carbidopa-levodopa        Take 2 tablets by mouth 3 times daily   Refills:  0        VITAMIN D (CHOLECALCIFEROL) PO   Dose:  1 tablet        Take 1 tablet by mouth 2 times daily (OTC: Nurse did not know dose)   Refills:  0                Prescriptions were sent or printed at these locations (4 Prescriptions)                   Other Prescriptions                Printed at Department/Unit printer (4 of 4)         triamcinolone (KENALOG) 0.1 % cream               predniSONE (DELTASONE) 20 MG tablet               predniSONE (DELTASONE) 10 MG tablet               triamcinolone acetonide 0.05 % OINT                 Procedures and tests performed during your visit     CBC with platelets + differential    Comprehensive metabolic panel    Digoxin level    IV access      Orders Needing Specimen Collection     None      Pending Results     No orders found from 1/24/2018 to 1/27/2018.            Pending Culture Results     No orders found from 1/24/2018 to 1/27/2018.            Pending Results Instructions     If you had any lab results that were not finalized at the time of your Discharge, you can call the ED Lab Result RN at [phone]. You will be contacted by this team for any positive Lab results or changes in treatment. The nurses are available 7 days a week from 10A to 6:30P.  You can leave a message 24 hours per day and they will return your call.        Test Results From Your Hospital Stay        1/26/2018  2:25 PM      Component Results     Component Value Ref Range & Units Status    WBC 8.7 4.0 - 11.0 10e9/L Final    RBC Count 3.50 (L) 4.4 - 5.9 10e12/L Final    Hemoglobin 10.6 (L) 13.3 - 17.7 g/dL Final    Hematocrit 33.1 (L) 40.0 - 53.0 % Final    MCV 95 78 - 100 fl Final    MCH 30.3 26.5 - 33.0 pg Final    MCHC 32.0 31.5 - 36.5 g/dL Final    RDW 15.7 (H) 10.0 - 15.0 % Final    Platelet Count 274 150 - 450 10e9/L Final    Diff Method Automated Method  Final    % Neutrophils 63.1 % Final    % Lymphocytes 18.0 % Final    % Monocytes 6.4 % Final    % Eosinophils 12.1 % Final    % Basophils 0.2 % Final    % Immature Granulocytes 0.2 % Final    Nucleated RBCs 0 0 /100 Final    Absolute Neutrophil 5.5 1.6 - 8.3 10e9/L Final    Absolute Lymphocytes 1.6 0.8 - 5.3 10e9/L Final    Absolute Monocytes 0.6 0.0 - 1.3 10e9/L Final    Absolute Eosinophils 1.1 (H) 0.0 - 0.7 10e9/L Final    Absolute Basophils 0.0 0.0 - 0.2 10e9/L Final    Abs Immature Granulocytes 0.0 0 - 0.4 10e9/L Final    Absolute Nucleated RBC 0.0  Final         1/26/2018  2:49 PM      Component Results     Component Value Ref Range & Units Status     Sodium 142 133 - 144 mmol/L Final    Potassium 4.4 3.4 - 5.3 mmol/L Final    Chloride 111 (H) 94 - 109 mmol/L Final    Carbon Dioxide 24 20 - 32 mmol/L Final    Anion Gap 7 3 - 14 mmol/L Final    Glucose 83 70 - 99 mg/dL Final    Urea Nitrogen 43 (H) 7 - 30 mg/dL Final    Creatinine 1.37 (H) 0.66 - 1.25 mg/dL Final    GFR Estimate 48 (L) >60 mL/min/1.7m2 Final    Non  GFR Calc    GFR Estimate If Black 58 (L) >60 mL/min/1.7m2 Final    African American GFR Calc    Calcium 8.1 (L) 8.5 - 10.1 mg/dL Final    Bilirubin Total 0.7 0.2 - 1.3 mg/dL Final    Albumin 3.0 (L) 3.4 - 5.0 g/dL Final    Protein Total 6.8 6.8 - 8.8 g/dL Final    Alkaline Phosphatase 45 40 - 150 U/L Final    ALT 11 0 - 70 U/L Final    AST 39 0 - 45 U/L Final         1/26/2018  3:10 PM      Component Results     Component Value Ref Range & Units Status    Digoxin Level 1.2 0.5 - 2.0 ug/L Final                Clinical Quality Measure: Blood Pressure Screening     Your blood pressure was checked while you were in the emergency department today. The last reading we obtained was  BP: 128/42 . Please read the guidelines below about what these numbers mean and what you should do about them.  If your systolic blood pressure (the top number) is less than 120 and your diastolic blood pressure (the bottom number) is less than 80, then your blood pressure is normal. There is nothing more that you need to do about it.  If your systolic blood pressure (the top number) is 120-139 or your diastolic blood pressure (the bottom number) is 80-89, your blood pressure may be higher than it should be. You should have your blood pressure rechecked within a year by a primary care provider.  If your systolic blood pressure (the top number) is 140 or greater or your diastolic blood pressure (the bottom number) is 90 or greater, you may have high blood pressure. High blood pressure is treatable, but if left untreated over time it can put you at risk for heart  "attack, stroke, or kidney failure. You should have your blood pressure rechecked by a primary care provider within the next 4 weeks.  If your provider in the emergency department today gave you specific instructions to follow-up with your doctor or provider even sooner than that, you should follow that instruction and not wait for up to 4 weeks for your follow-up visit.        Thank you for choosing [facility]       Thank you for choosing [facility] for your care. Our goal is always to provide you with excellent care. Hearing back from our patients is one way we can continue to improve our services. Please take a few minutes to complete the written survey that you may receive in the mail after you visit with us. Thank you!        [employer] Information     [employer] lets you send messages to your doctor, view your test results, renew your prescriptions, schedule appointments and more. To sign up, go to [email or web address] . Click on \"Log in\" on the left side of the screen, which will take you to the Welcome page. Then click on \"Sign up Now\" on the right side of the page.     You will be asked to enter the access code listed below, as well as some personal information. Please follow the directions to create your username and password.     Your access code is: [email or web address]  Expires: 3/19/2018 11:26 AM     Your access code will  in 90 days. If you need help or a new code, please call your [facility] clinic or [phone].        Care EveryWhere ID     This is your Care EveryWhere ID. This could be used by other organizations to access your [facility] medical records  [id number]        Equal Access to Services     JANE PAYAN : Hadsal Matos, waaxda lucandiadaha, qaybta kaalneelima foote. So [facility] [phone].    ATENCIÓN: Si habla español, tiene a holliday disposición servicios gratuitos de asistencia lingüística. Llame al [phone].    We comply with applicable " federal civil rights laws and [place] laws. We do not discriminate on the basis of race, color, national origin, age, disability, sex, sexual orientation, or gender identity.            After Visit Summary       This is your record. Keep this with you and show to your community pharmacist(s) and doctor(s) at your next visit.

## 2018-01-26 NOTE — DISCHARGE INSTRUCTIONS
What is Atopic Dermatitis?  Atopic dermatitis (also called eczema) causes chronic skin irritation. It is often found in infants, teens, and adults. This disease often runs in families (is genetic). It may also be linked to allergies, such as hay fever and sometimes asthma. Patches of skin become dry, red, itchy, and scaly. In older adults, abnormally dry skin is often called xerosis. Sometimes eczema is only on the hands or feet. It often improves when the skin is well hydrated. It gets worse when the skin is dry. You can help control symptoms by practicing good self-care. Avoid anything that causes flare-ups (such as sunburn or vigorous scratching).  Where do you have symptoms?  Atopic dermatitis symptoms can appear anywhere on the body. But in most cases they vary based on the person s age. In infants, irritation is often seen on the cheeks, chin, near the mouth, and under the eyelids. In children ages 2 through 10, skin folds, such as the backs of the knees, or in the arm crease, are most often affected. In children 11 and older and in adults, symptoms can affect many areas.  What triggers symptoms?  Symptoms flare because of many things. These include skin dryness, scratching, stress, harsh soaps, and irritants such as dust or wool. Try to avoid anything that causes flare-ups.  Recognizing what causes flare-ups  To figure out what causes atopic dermatitis to flare, keep a list of things that seem to affect your skin. Start by filling in the spaces below. Then keep writing them down in a notebook or diary. The things that affect each person vary. So keep your own list and try to avoid your triggers.    Date Last Reviewed: 2/1/2017 2000-2017 [employer]. [address] [place] [address], [place] [address]. All rights reserved. This information is not intended as a substitute for professional medical care. Always follow your healthcare professional's instructions.      1.  Vanicream ([employer] or similar  place) apply twice a day to affected areas  2.  Triamcinolone ointment twice a day  3.  Prednisone taper for 2 weeks  4.  Follow up with dermatology this week  5.  Return if worse

## 2018-01-26 NOTE — ED AVS SNAPSHOT
Emergency Department    [address] [place] [address]    Phone:  [phone]    Fax:  [phone]                                       Loco Hawkins   MRN: [id number]    Department:   Emergency Department   Date of Visit:  1/26/2018           After Visit Summary Signature Page     I have received my discharge instructions, and my questions have been answered. I have discussed any challenges I see with this plan with the nurse or doctor.    ..........................................................................................................................................  Patient/Patient Representative Signature      ..........................................................................................................................................  Patient Representative Print Name and Relationship to Patient    ..................................................               ................................................  Date                                            Time    ..........................................................................................................................................  Reviewed by Signature/Title    ...................................................              ..............................................  Date                                                            Time

## 2018-01-26 NOTE — ED PROVIDER NOTES
"  History     Chief Complaint:  Leg Swelling    HPI   Loco Hawkins is a 89 year old male with a history of Parkinson's Disease who presents from his apartment where he has assisted living to the emergency department for evaluation of leg swelling. The patient complains of hand and foot swelling, worst in his right lower leg. His nephew is with him. This began in his right leg when he was diagnosed with Parkinson's Disease. This would be a small amount of itching generally. However, this new presentation developed five days ago in his right leg. His leg began itching, and he states that this has moved up his leg, into his right arm first, and then into his back, left leg and left arm. He endorses notable swelling, redness, and itching. He states that there really is not any pain. The patient states that he is on quite a few medications daily, and he thinks that new medications have been added but he is unsure what these would be. The patient notes a new lotion since \"he started getting the itchies,\" however. He does not know of any recent antibiotics in his regimen. He denies any trouble with breathing and a history of skin problems.    Allergies:  No Known Drug Allergies     Medications:    Aspirin    Senna-Docusate  Diltiazem   Digoxin  Sinemet    Past Medical History:    Dementia  Parkinson's Disease  Influenza A     Past Surgical History:    History reviewed. No pertinent past surgical history.     Family History:    History reviewed. No pertinent family history.      Social History:  The patient was accompanied to the ED by his nephew.  Smokeless Tobacco: Never  Alcohol Use: Yes   Marital Status:        Review of Systems   Respiratory: Negative for chest tightness and shortness of breath.    Musculoskeletal: Positive for joint swelling.   Skin: Positive for color change and rash.   All other systems reviewed and are negative.    Physical Exam   Patient Vitals for the past 24 hrs:   BP Temp Temp src " "Heart Rate Resp SpO2 Height Weight   01/26/18 1247 128/42 97.6  F (36.4  C) Oral 67 16 100 % 1.702 m (5' 7\") 59.9 kg (132 lb)      Physical Exam  GENERAL: well developed, pleasant  HEAD: atraumatic  EYES: pupils reactive, extraocular muscles intact, conjunctivae normal  ENT:  mucus membranes moist. No lesions in the mouth  NECK:  trachea midline, normal range of motion  RESPIRATORY: no tachypnea, breath sounds clear to auscultation   CVS: normal S1/S2, no murmurs, intact distal pulses  ABDOMEN: soft, nontender, nondistention  MUSCULOSKELETAL: Edema to both upper and lower extremities.   SKIN: warm and dry. Dry skin to the arms and back, diffuse area of erythema to the back, both arms, and then wrapping around to the anterior abdomen. There are some scratch marks to the lower legs but no real redness now in the legs. Skin is thickened.   NEURO: GCS 15, cranial nerves intact, alert and oriented x3. Resting tremor in the right arm  PSYCH:  Mood/affect normal                      Emergency Department Course     Laboratory:  Laboratory findings were communicated with the patient and family who voiced understanding of the findings.    CBC: HGB 10.6 (L) o/w WNL. (WBC 8.7, )   CMP: Chloride 111 (H), BUN 43 (H), Creatinine 1.37 (H), GFR Estimate 48 (L), Calcium 8.1 (L), Albumin 3.0 (L) o/w WNL    Digoxin level: 1.2     Emergency Department Course:  Nursing notes and vitals reviewed.  IV was inserted and blood was drawn for laboratory testing, results above.    1349: I performed an exam of the patient as documented above.   1540: Patient rechecked and updated.     Findings and plan explained to the Patient and nephew. Patient discharged home with instructions regarding supportive care, medications, and reasons to return. The importance of close follow-up was reviewed. The patient was prescribed a Prednisone taper, Kenalog cream and Acetonide ointment.   I personally reviewed the laboratory results with the Patient and " nephew and answered all related questions prior to discharge.     Impression & Plan      Medical Decision Making:  Loco Hawkins is a 89 year old male who presents to the emergency department today with itchy rash, predominantly to his entire back and both arms. It is fairly symmetric and unlikely to represent cellulitis. He does not feel sick or ill and has had no new medications. His medications that he is on are not classic for drug reaction. He has thickened, dry, scaly skin. Do not suspect DVT. I spoke with Dermatology and ordered a combination of treatment including Vanicream, Triamcinolone ointment, and steroid dose pack for two weeks. I discussed all of this of the family and have written all of this down for them.  His lower extremities are included in the photographs, but not felt to be part of the problem.     Diagnosis:    ICD-10-CM    1. Dermatitis L30.9        Disposition:  Discharged to home.    Discharge Medications:  New Prescriptions    PREDNISONE (DELTASONE) 10 MG TABLET    Take 1 tablet (10 mg) by mouth daily for 5 doses    PREDNISONE (DELTASONE) 20 MG TABLET    Take 1 tablet (20 mg) by mouth daily for 12 doses    TRIAMCINOLONE (KENALOG) 0.1 % CREAM    450 grams    TRIAMCINOLONE ACETONIDE 0.05 % OINT    Externally apply topically 2 times daily       Scribe Disclosure:  DERRICK, Kim Edward, am serving as a scribe at 1:49 PM on 1/26/2018 to document services personally performed by Declan Robles MD based on my observations and the provider's statements to me.    1/26/2018    EMERGENCY DEPARTMENT       Declan Robles MD  01/29/18 8293

## 2018-02-05 ENCOUNTER — APPOINTMENT (OUTPATIENT)
Dept: ULTRASOUND IMAGING | Facility: CLINIC | Age: 83
End: 2018-02-05
Attending: EMERGENCY MEDICINE
Payer: MEDICARE

## 2018-02-05 ENCOUNTER — HOSPITAL ENCOUNTER (EMERGENCY)
Facility: CLINIC | Age: 83
Discharge: HOME | End: 2018-02-05
Attending: EMERGENCY MEDICINE | Admitting: EMERGENCY MEDICINE
Payer: MEDICARE

## 2018-02-05 VITALS
BODY MASS INDEX: 21.97 KG/M2 | DIASTOLIC BLOOD PRESSURE: 65 MMHG | OXYGEN SATURATION: 98 % | HEART RATE: 64 BPM | WEIGHT: 140 LBS | RESPIRATION RATE: 16 BRPM | HEIGHT: 67 IN | TEMPERATURE: 97.9 F | SYSTOLIC BLOOD PRESSURE: 165 MMHG

## 2018-02-05 DIAGNOSIS — M79.89 LEG SWELLING: ICD-10-CM

## 2018-02-05 DIAGNOSIS — R79.89 ELEVATED BRAIN NATRIURETIC PEPTIDE (BNP) LEVEL: ICD-10-CM

## 2018-02-05 LAB
ANION GAP SERPL CALCULATED.3IONS-SCNC: 8 MMOL/L (ref 3–14)
BASOPHILS # BLD AUTO: 0 10E9/L (ref 0–0.2)
BASOPHILS NFR BLD AUTO: 0.1 %
BUN SERPL-MCNC: 35 MG/DL (ref 7–30)
CALCIUM SERPL-MCNC: 7.7 MG/DL (ref 8.5–10.1)
CHLORIDE SERPL-SCNC: 110 MMOL/L (ref 94–109)
CO2 SERPL-SCNC: 24 MMOL/L (ref 20–32)
CREAT SERPL-MCNC: 1.31 MG/DL (ref 0.66–1.25)
DIFFERENTIAL METHOD BLD: ABNORMAL
EOSINOPHIL # BLD AUTO: 1.1 10E9/L (ref 0–0.7)
EOSINOPHIL NFR BLD AUTO: 8 %
ERYTHROCYTE [DISTWIDTH] IN BLOOD BY AUTOMATED COUNT: 17.3 % (ref 10–15)
GFR SERPL CREATININE-BSD FRML MDRD: 51 ML/MIN/1.7M2
GLUCOSE SERPL-MCNC: 82 MG/DL (ref 70–99)
HCT VFR BLD AUTO: 33.9 % (ref 40–53)
HGB BLD-MCNC: 10.7 G/DL (ref 13.3–17.7)
IMM GRANULOCYTES # BLD: 0.1 10E9/L (ref 0–0.4)
IMM GRANULOCYTES NFR BLD: 0.6 %
LYMPHOCYTES # BLD AUTO: 2 10E9/L (ref 0.8–5.3)
LYMPHOCYTES NFR BLD AUTO: 14.6 %
MCH RBC QN AUTO: 30.4 PG (ref 26.5–33)
MCHC RBC AUTO-ENTMCNC: 31.6 G/DL (ref 31.5–36.5)
MCV RBC AUTO: 96 FL (ref 78–100)
MONOCYTES # BLD AUTO: 0.8 10E9/L (ref 0–1.3)
MONOCYTES NFR BLD AUTO: 5.7 %
NEUTROPHILS # BLD AUTO: 9.8 10E9/L (ref 1.6–8.3)
NEUTROPHILS NFR BLD AUTO: 71 %
NRBC # BLD AUTO: 0 10*3/UL
NRBC BLD AUTO-RTO: 0 /100
NT-PROBNP SERPL-MCNC: 3624 PG/ML (ref 0–1800)
PLATELET # BLD AUTO: 340 10E9/L (ref 150–450)
POTASSIUM SERPL-SCNC: 4.2 MMOL/L (ref 3.4–5.3)
RBC # BLD AUTO: 3.52 10E12/L (ref 4.4–5.9)
SODIUM SERPL-SCNC: 142 MMOL/L (ref 133–144)
WBC # BLD AUTO: 13.8 10E9/L (ref 4–11)

## 2018-02-05 PROCEDURE — 25000132 ZZH RX MED GY IP 250 OP 250 PS 637: Mod: GY | Performed by: EMERGENCY MEDICINE

## 2018-02-05 PROCEDURE — 99284 EMERGENCY DEPT VISIT MOD MDM: CPT | Mod: 25

## 2018-02-05 PROCEDURE — 80048 BASIC METABOLIC PNL TOTAL CA: CPT | Performed by: EMERGENCY MEDICINE

## 2018-02-05 PROCEDURE — 83880 ASSAY OF NATRIURETIC PEPTIDE: CPT | Performed by: EMERGENCY MEDICINE

## 2018-02-05 PROCEDURE — A9270 NON-COVERED ITEM OR SERVICE: HCPCS | Mod: GY | Performed by: EMERGENCY MEDICINE

## 2018-02-05 PROCEDURE — 85025 COMPLETE CBC W/AUTO DIFF WBC: CPT | Performed by: EMERGENCY MEDICINE

## 2018-02-05 PROCEDURE — 93970 EXTREMITY STUDY: CPT

## 2018-02-05 RX ORDER — FUROSEMIDE 40 MG
40 TABLET ORAL DAILY
Status: DISCONTINUED | OUTPATIENT
Start: 2018-02-05 | End: 2018-02-05

## 2018-02-05 RX ORDER — FUROSEMIDE 40 MG
40 TABLET ORAL ONCE
Status: COMPLETED | OUTPATIENT
Start: 2018-02-05 | End: 2018-02-05

## 2018-02-05 RX ADMIN — FUROSEMIDE 40 MG: 40 TABLET ORAL at 12:29

## 2018-02-05 ASSESSMENT — ENCOUNTER SYMPTOMS
COUGH: 0
SHORTNESS OF BREATH: 0
VOICE CHANGE: 1

## 2018-02-05 NOTE — ED AVS SNAPSHOT
Emergency Department    [address] [facility] [address]    Phone:  [phone]    Fax:  [phone]                                       Loco Hawkins   MRN: [id number]    Department:   Emergency Department   Date of Visit:  2/5/2018           Patient Information     Date Of Birth          10/31/1921        Your diagnoses for this visit were:     Leg swelling     Elevated brain natriuretic peptide (BNP) level        You were seen by Mary Blackwell MD.      Follow-up Information     Follow up with Baljeet Londono MD. Schedule an appointment as soon as possible for a visit in 1 day.    Specialty:  Family Practice    Contact information:    [facility] [address]  [address]  [facility] [address] [phone]          Follow up with  Emergency Department.    Specialty:  EMERGENCY MEDICINE    Why:  As needed, If symptoms worsen    Contact information:    [address] [facility] [address] [phone]        Discharge Instructions       Contact your clinic to make an appointment within 2 days.  We gave you one dose of a water pill today, but because of your kidney function I did not want to start you on a regular water pill without your primary doctor being part of the decision making.    Return for any shortness of breath or difficulty breathing while laying down.    24 Hour Appointment Hotline       To make an appointment at any [facility], call [phone] ([phone]). If you don't have a family doctor or clinic, we will help you find one. [facility] clinics are conveniently located to serve the needs of you and your family.             Review of your medicines      Our records show that you are taking the medicines listed below. If these are incorrect, please call your family doctor or clinic.        Dose / Directions Last dose taken    ASPIRIN EC PO   Dose:  81 mg        Take 81 mg by mouth daily   Refills:  0        CARTIA  MG 24 hr  capsule   Dose:  180 mg   Generic drug:  diltiazem        Take 180 mg by mouth daily   Refills:  0        DIGOXIN PO   Dose:  125 mcg        Take 125 mcg by mouth five times a week Monday-Friday   Refills:  0        DOCUSATE SODIUM PO   Dose:  100 mg        Take 100 mg by mouth 2 times daily   Refills:  0        dorzolamide-timolol 2-0.5 % ophthalmic solution   Commonly known as:  COSOPT   Dose:  1 drop        Place 1 drop into both eyes 2 times daily   Refills:  0        latanoprost 0.005 % ophthalmic solution   Commonly known as:  XALATAN   Dose:  1 drop        Place 1 drop into both eyes At Bedtime   Refills:  0        Multi-vitamin Tabs tablet   Dose:  1 tablet        Take 1 tablet by mouth daily   Refills:  0        predniSONE 20 MG tablet   Commonly known as:  DELTASONE   Dose:  20 mg   Quantity:  12 tablet        Take 1 tablet (20 mg) by mouth daily for 12 doses   Refills:  0        senna-docusate 8.6-50 MG per tablet   Commonly known as:  SENOKOT-S;PERICOLACE   Dose:  2 tablet   Quantity:  100 tablet        Take 2 tablets by mouth 2 times daily as needed for constipation   Refills:  0        SINEMET  MG per tablet   Dose:  2 tablet   Generic drug:  carbidopa-levodopa        Take 2 tablets by mouth 3 times daily   Refills:  0        * triamcinolone 0.1 % cream   Commonly known as:  KENALOG   Quantity:  450 g        450 grams   Refills:  0        * triamcinolone acetonide 0.05 % Oint   Quantity:  430 g        Externally apply topically 2 times daily   Refills:  0        VITAMIN D (CHOLECALCIFEROL) PO   Dose:  1 tablet        Take 1 tablet by mouth 2 times daily (OTC: Nurse did not know dose)   Refills:  0        * Notice:  This list has 2 medication(s) that are the same as other medications prescribed for you. Read the directions carefully, and ask your doctor or other care provider to review them with you.            Procedures and tests performed during your visit     Basic metabolic panel    CBC with  platelets differential    Nt probnp inpatient (BNP)    US Lower Extremity Venous Duplex Bilateral      Orders Needing Specimen Collection     None      Pending Results     No orders found from 2/3/2018 to 2/6/2018.            Pending Culture Results     No orders found from 2/3/2018 to 2/6/2018.            Pending Results Instructions     If you had any lab results that were not finalized at the time of your Discharge, you can call the ED Lab Result RN at [phone]. You will be contacted by this team for any positive Lab results or changes in treatment. The nurses are available 7 days a week from 10A to 6:30P.  You can leave a message 24 hours per day and they will return your call.        Test Results From Your Hospital Stay        2/5/2018 10:46 AM      Component Results     Component Value Ref Range & Units Status    WBC 13.8 (H) 4.0 - 11.0 10e9/L Final    RBC Count 3.52 (L) 4.4 - 5.9 10e12/L Final    Hemoglobin 10.7 (L) 13.3 - 17.7 g/dL Final    Hematocrit 33.9 (L) 40.0 - 53.0 % Final    MCV 96 78 - 100 fl Final    MCH 30.4 26.5 - 33.0 pg Final    MCHC 31.6 31.5 - 36.5 g/dL Final    RDW 17.3 (H) 10.0 - 15.0 % Final    Platelet Count 340 150 - 450 10e9/L Final    Diff Method Automated Method  Final    % Neutrophils 71.0 % Final    % Lymphocytes 14.6 % Final    % Monocytes 5.7 % Final    % Eosinophils 8.0 % Final    % Basophils 0.1 % Final    % Immature Granulocytes 0.6 % Final    Nucleated RBCs 0 0 /100 Final    Absolute Neutrophil 9.8 (H) 1.6 - 8.3 10e9/L Final    Absolute Lymphocytes 2.0 0.8 - 5.3 10e9/L Final    Absolute Monocytes 0.8 0.0 - 1.3 10e9/L Final    Absolute Eosinophils 1.1 (H) 0.0 - 0.7 10e9/L Final    Absolute Basophils 0.0 0.0 - 0.2 10e9/L Final    Abs Immature Granulocytes 0.1 0 - 0.4 10e9/L Final    Absolute Nucleated RBC 0.0  Final         2/5/2018 10:48 AM      Component Results     Component Value Ref Range & Units Status    Sodium 142 133 - 144 mmol/L Final    Potassium 4.2 3.4 - 5.3  mmol/L Final    Chloride 110 (H) 94 - 109 mmol/L Final    Carbon Dioxide 24 20 - 32 mmol/L Final    Anion Gap 8 3 - 14 mmol/L Final    Glucose 82 70 - 99 mg/dL Final    Urea Nitrogen 35 (H) 7 - 30 mg/dL Final    Creatinine 1.31 (H) 0.66 - 1.25 mg/dL Final    GFR Estimate 51 (L) >60 mL/min/1.7m2 Final    Non  GFR Calc    GFR Estimate If Black 61 >60 mL/min/1.7m2 Final    African American GFR Calc    Calcium 7.7 (L) 8.5 - 10.1 mg/dL Final         2/5/2018 10:52 AM      Component Results     Component Value Ref Range & Units Status    N-Terminal Pro BNP Inpatient 3624 (H) 0 - 1800 pg/mL Final       Reference range shown and results flagged as abnormal are suggested inpatient   cut points for confirming diagnosis if CHF in an acute setting. Establishing a   baseline value for each individual patient is useful for follow-up. An   inpatient or emergency department NT-proPBNP <300 pg/mL effectively rules out   acute CHF, with 99% negative predictive value.  The outpatient non-acute reference range for ruling out CHF is:   0-125 pg/mL (age 18 to less than 75)   0-450 pg/mL (age 75 yrs and older)           2/5/2018 10:48 AM      Narrative     US LOWER EXTREMITY VENOUS DUPLEX BILATERAL   2/5/2018 10:43 AM     HISTORY: Bilateral leg swelling.    COMPARISON: None.    TECHNIQUE: Spectral doppler and waveform analysis were performed on  the bilateral lower extremity veins.    FINDINGS: The bilateral common femoral, femoral, and popliteal veins  are patent, compressible, and negative for deep venous thrombosis.  Calf veins are segmentally seen but appear negative for DVT where  visualized. A small cystic lesion in the left popliteal fossa measures  1.7 x 1.6 x 0.7 cm, and most likely represents a small Baker's cyst.  Edema is noted within the bilateral lower extremities.        Impression     IMPRESSION:  No evidence for deep venous thrombosis in the bilateral  lower extremity veins.    ELOY CORONADO MD                 Clinical Quality Measure: Blood Pressure Screening     Your blood pressure was checked while you were in the emergency department today. The last reading we obtained was  BP: 171/54 . Please read the guidelines below about what these numbers mean and what you should do about them.  If your systolic blood pressure (the top number) is less than 120 and your diastolic blood pressure (the bottom number) is less than 80, then your blood pressure is normal. There is nothing more that you need to do about it.  If your systolic blood pressure (the top number) is 120-139 or your diastolic blood pressure (the bottom number) is 80-89, your blood pressure may be higher than it should be. You should have your blood pressure rechecked within a year by a primary care provider.  If your systolic blood pressure (the top number) is 140 or greater or your diastolic blood pressure (the bottom number) is 90 or greater, you may have high blood pressure. High blood pressure is treatable, but if left untreated over time it can put you at risk for heart attack, stroke, or kidney failure. You should have your blood pressure rechecked by a primary care provider within the next 4 weeks.  If your provider in the emergency department today gave you specific instructions to follow-up with your doctor or provider even sooner than that, you should follow that instruction and not wait for up to 4 weeks for your follow-up visit.        Thank you for choosing [facility]       Thank you for choosing [facility] for your care. Our goal is always to provide you with excellent care. Hearing back from our patients is one way we can continue to improve our services. Please take a few minutes to complete the written survey that you may receive in the mail after you visit with us. Thank you!        [email or web address] Information     [employer] lets you send messages to your doctor, view your test results, renew your prescriptions, schedule appointments and more. To  "sign up, go to [email or web address] . Click on \"Log in\" on the left side of the screen, which will take you to the Welcome page. Then click on \"Sign up Now\" on the right side of the page.     You will be asked to enter the access code listed below, as well as some personal information. Please follow the directions to create your username and password.     Your access code is: [email or web address]  Expires: 3/19/2018 11:26 AM     Your access code will  in 90 days. If you need help or a new code, please call your [facility] clinic or [phone].        Care EveryWhere ID     This is your Care EveryWhere ID. This could be used by other organizations to access your [facility] medical records  [id number]        Equal Access to Services     JANE PAYAN : Elina Matos, radha barrera, mary castellon, neelima lepe. So [facility] [phone].    ATENCIÓN: Si habla español, tiene a holliday disposición servicios gratuitos de asistencia lingüística. Sherice al [phone].    We comply with applicable federal civil rights laws and [place] laws. We do not discriminate on the basis of race, color, national origin, age, disability, sex, sexual orientation, or gender identity.            After Visit Summary       This is your record. Keep this with you and show to your community pharmacist(s) and doctor(s) at your next visit.                  "

## 2018-02-05 NOTE — ED AVS SNAPSHOT
Emergency Department    [address] [place] [address]    Phone:  [phone]    Fax:  [phone]                                       Loco Hawkins   MRN: [id number]    Department:   Emergency Department   Date of Visit:  2/5/2018           After Visit Summary Signature Page     I have received my discharge instructions, and my questions have been answered. I have discussed any challenges I see with this plan with the nurse or doctor.    ..........................................................................................................................................  Patient/Patient Representative Signature      ..........................................................................................................................................  Patient Representative Print Name and Relationship to Patient    ..................................................               ................................................  Date                                            Time    ..........................................................................................................................................  Reviewed by Signature/Title    ...................................................              ..............................................  Date                                                            Time

## 2018-02-05 NOTE — ED PROVIDER NOTES
History     Chief Complaint:  Leg Swelling      The history is provided by the patient and a relative.      Loco Hawkins is a 89 year old male with a history of dementia and Parkinson's disease who presents to the ED for evaluation of bilateral leg edema. The patient's nephew reports that recent edema to his bilateral lower extremities. The patient has seen podiatry who had no explanation for his swelling. He saw Dr. Robles of [facility] ED on 01/26, was diagnosed with dermatitis, and prescribed prednisone and lotions. He has regularly taken his prednisone. Last night, the patient's swelling increased up into his groin. Today, he and his nephew discussed his case with Urgent Care, who recommended he come to the ED due to his symptoms and possibly due to a shortage of physicians at Urgent Care.    Here in the ED, the patient reports a hoarse voice, but denies shortness of breath, difficulty breathing while lying down, or a cough. He has had recent issues with bilateral hand edema, however, the patient reports that the edema has decreased, but is not normal.        Allergies:  Penicillins    Medications:    Kenalog  Prednisone  Triamcinolone acetonide ointment  Senokot  Aspirin  Cosopt  Zlaltan  Cartia  Digoxin  Sinemet  Docusate sodium    Past Medical History:    Dementia  Parkinson's disease    Past Surgical History:    The patient does not have any pertinent past surgical history.    Family History:    No past pertinent family history.    Social History:  Presents with his nephew.  Negative for alcohol use.  Unknown if patient has ever smoked.  Marital Status:   [2]    Review of Systems   HENT: Positive for voice change.    Respiratory: Negative for cough and shortness of breath.    Cardiovascular: Positive for leg swelling.   Musculoskeletal:        Positive for bilateral hand edema.   All other systems reviewed and are negative.    Physical Exam   First Vitals:  Patient Vitals for the past 24 hrs:   BP  "Temp Temp src Pulse Resp SpO2 Height Weight   02/05/18 1248 165/65 - - - - - - -   02/05/18 0955 171/54 97.9  F (36.6  C) Oral 64 16 98 % 1.702 m (5' 7\") 63.5 kg (140 lb)     Physical Exam  General: Resting on the gurney, appears comfortable.   Head:  The scalp, face, and head appear normal  Mouth/Throat: Mucus membranes are moist  CV:  Regular rate    Normal S1 and S2  No pathological murmur   Resp:  Lungs clear to ascultation with no crackles heard in the bases.     Non-labored, no retractions or accessory muscle use    No coarseness    No wheezing   GI:  Abdomen is soft, no rigidity    No tenderness to palpation  :  No scrotal redness, considerable edema, or other abnormality.  MS:  Normal motor assessment of all extremities.    Good capillary refill noted.     Bilateral lower extremities with pitting edema, most notable to the hips, but minimally present to the thighs.     Bilateral upper extremities much improved compared to prior imaging.   Skin:   No rash or lesions noted.  Neuro:   Speech is normal and fluent. No apparent deficit.  Psych: Awake. Alert.  Normal affect.      Appropriate interactions.    Emergency Department Course   Imaging:  Radiographic findings were communicated with the patient who voiced understanding of the findings.    US Lower Extremity, Venous Duplex, bilateral:  IMPRESSION:  No evidence for deep venous thrombosis in the bilateral  lower extremity veins. As per radiology.     Laboratory:  CBC: WBC: 13.8 (H), HGB: 10.7 (L), PLT: 340  BMP: Glucose 82, Chloride 110 (H), BUN 35 (H), Creatinine 1.31 (H), GFR 51 (L), Calcium 7.7 (L), o/w WNL     BNP: 3624 (H)    Interventions:  1229 Lasix 40 mg oral    Emergency Department Course:  Nursing notes and vitals reviewed. 1000 I performed an exam of the patient as documented above.     Medicine administered as documented above. Blood drawn. This was sent to the lab for further testing, results above.    The patient was sent for a US Lower " "extremity while in the emergency department, findings above.     1050 I rechecked the patient and discussed the results of his workup thus far.     1151 I discussed the case with the patient's primary care clinic. I was unable to get in contact with his primary care physician.     1201 I rechecked the patient and discussed the results of his workup thus far.     Findings and plan explained to the Patient. Patient discharged home with instructions regarding supportive care, medications, and reasons to return. The importance of close follow-up was reviewed.     I personally reviewed the laboratory results with the Patient and answered all related questions prior to discharge.     Impression & Plan    Medical Decision Making:  Loco Hawkins is a 89 year old male who presents to the ED complaining of bilateral lower extremity swelling. Patient has been seen several times for lower extremity edema. He has no respiratory symptoms whatsoever. Other than having \"a tickle in his throat\", he has no difficulty breathing while lying flat and has no crackles on pulmonary exam. He does have an elevated BNP, however given that he is elderly and has some underlying renal dysfunction, I did not want to start him on a long term diuretic without speaking with his PCP. I called his primary clinic, and unfortunately his primary was not available today, but does have an appointment tomorrow. I gave the patient a one-time dose of 40 mg of Lasix in hopes to decrease some of his fluid burden. As he has no respiratory symptoms and no crackles on exam, I did not believe the Chest XR was needed. I recommended that he follow closely in clinic for further decisions regarding his fluid overload. Disposition: discharged in stable condition.     Diagnosis:    ICD-10-CM   1. Leg swelling M79.89   2. Elevated brain natriuretic peptide (BNP) level R79.89       Disposition:  discharged to home    I, Lelo Grijalva, am serving as a scribe on " 2/5/2018 at 10:00 AM to personally document services performed by Mary Blackwell MD based on my observations and the provider's statements to me.       Lelo Grijalva  2/5/2018    EMERGENCY DEPARTMENT       Mary Blackwell MD  02/06/18 1803

## 2018-02-05 NOTE — DISCHARGE INSTRUCTIONS
Contact your clinic to make an appointment within 2 days.  We gave you one dose of a water pill today, but because of your kidney function I did not want to start you on a regular water pill without your primary doctor being part of the decision making.    Return for any shortness of breath or difficulty breathing while laying down.

## 2018-07-03 ENCOUNTER — RECORDS - HEALTHEAST (OUTPATIENT)
Dept: LAB | Facility: CLINIC | Age: 83
End: 2018-07-03

## 2018-07-03 LAB
ANION GAP SERPL CALCULATED.3IONS-SCNC: 8 MMOL/L (ref 5–18)
BUN SERPL-MCNC: 52 MG/DL (ref 8–28)
CALCIUM SERPL-MCNC: 8.7 MG/DL (ref 8.5–10.5)
CHLORIDE BLD-SCNC: 112 MMOL/L (ref 98–107)
CO2 SERPL-SCNC: 23 MMOL/L (ref 22–31)
CREAT SERPL-MCNC: 1.83 MG/DL (ref 0.7–1.3)
ERYTHROCYTE [DISTWIDTH] IN BLOOD BY AUTOMATED COUNT: 15.5 % (ref 11–14.5)
GFR SERPL CREATININE-BSD FRML MDRD: 34 ML/MIN/1.73M2
GLUCOSE BLD-MCNC: 74 MG/DL (ref 70–125)
HCT VFR BLD AUTO: 32 % (ref 40–54)
HGB BLD-MCNC: 10.2 G/DL (ref 14–18)
MCH RBC QN AUTO: 31.3 PG (ref 27–34)
MCHC RBC AUTO-ENTMCNC: 31.9 G/DL (ref 32–36)
MCV RBC AUTO: 98 FL (ref 80–100)
PLATELET # BLD AUTO: 185 THOU/UL (ref 140–440)
PMV BLD AUTO: 10.8 FL (ref 8.5–12.5)
POTASSIUM BLD-SCNC: 4 MMOL/L (ref 3.5–5)
RBC # BLD AUTO: 3.26 MILL/UL (ref 4.4–6.2)
SODIUM SERPL-SCNC: 143 MMOL/L (ref 136–145)
WBC: 6.3 THOU/UL (ref 4–11)

## 2018-07-04 ENCOUNTER — RECORDS - HEALTHEAST (OUTPATIENT)
Dept: LAB | Facility: CLINIC | Age: 83
End: 2018-07-04

## 2018-07-05 LAB — 25(OH)D3 SERPL-MCNC: 44.6 NG/ML (ref 30–80)

## 2018-08-06 ENCOUNTER — RECORDS - HEALTHEAST (OUTPATIENT)
Dept: LAB | Facility: CLINIC | Age: 83
End: 2018-08-06

## 2018-08-07 LAB
ANION GAP SERPL CALCULATED.3IONS-SCNC: 9 MMOL/L (ref 5–18)
BUN SERPL-MCNC: 53 MG/DL (ref 8–28)
CALCIUM SERPL-MCNC: 9.1 MG/DL (ref 8.5–10.5)
CHLORIDE BLD-SCNC: 109 MMOL/L (ref 98–107)
CO2 SERPL-SCNC: 24 MMOL/L (ref 22–31)
CREAT SERPL-MCNC: 2.03 MG/DL (ref 0.7–1.3)
GFR SERPL CREATININE-BSD FRML MDRD: 31 ML/MIN/1.73M2
GLUCOSE BLD-MCNC: 70 MG/DL (ref 70–125)
POTASSIUM BLD-SCNC: 4.7 MMOL/L (ref 3.5–5)
SODIUM SERPL-SCNC: 142 MMOL/L (ref 136–145)

## 2018-08-14 ENCOUNTER — RECORDS - HEALTHEAST (OUTPATIENT)
Dept: LAB | Facility: CLINIC | Age: 83
End: 2018-08-14

## 2018-08-14 LAB — DIGOXIN LEVEL LHE- HISTORICAL: 1.6 NG/ML (ref 0.5–2)

## 2018-10-09 ENCOUNTER — RECORDS - HEALTHEAST (OUTPATIENT)
Dept: LAB | Facility: CLINIC | Age: 83
End: 2018-10-09

## 2018-10-09 LAB
ANION GAP SERPL CALCULATED.3IONS-SCNC: 10 MMOL/L (ref 5–18)
BUN SERPL-MCNC: 59 MG/DL (ref 8–28)
CALCIUM SERPL-MCNC: 9.1 MG/DL (ref 8.5–10.5)
CHLORIDE BLD-SCNC: 107 MMOL/L (ref 98–107)
CO2 SERPL-SCNC: 23 MMOL/L (ref 22–31)
CREAT SERPL-MCNC: 2.54 MG/DL (ref 0.7–1.3)
GFR SERPL CREATININE-BSD FRML MDRD: 24 ML/MIN/1.73M2
GLUCOSE BLD-MCNC: 100 MG/DL (ref 70–125)
POTASSIUM BLD-SCNC: 4.4 MMOL/L (ref 3.5–5)
SODIUM SERPL-SCNC: 140 MMOL/L (ref 136–145)

## 2018-10-15 ENCOUNTER — RECORDS - HEALTHEAST (OUTPATIENT)
Dept: LAB | Facility: CLINIC | Age: 83
End: 2018-10-15

## 2018-10-16 LAB — DIGOXIN LEVEL LHE- HISTORICAL: 0.8 NG/ML (ref 0.5–2)

## 2018-10-22 ENCOUNTER — RECORDS - HEALTHEAST (OUTPATIENT)
Dept: LAB | Facility: CLINIC | Age: 83
End: 2018-10-22

## 2018-10-23 LAB
ANION GAP SERPL CALCULATED.3IONS-SCNC: 9 MMOL/L (ref 5–18)
BUN SERPL-MCNC: 71 MG/DL (ref 8–28)
CALCIUM SERPL-MCNC: 8.5 MG/DL (ref 8.5–10.5)
CHLORIDE BLD-SCNC: 108 MMOL/L (ref 98–107)
CO2 SERPL-SCNC: 23 MMOL/L (ref 22–31)
CREAT SERPL-MCNC: 2.56 MG/DL (ref 0.7–1.3)
GFR SERPL CREATININE-BSD FRML MDRD: 23 ML/MIN/1.73M2
GLUCOSE BLD-MCNC: 97 MG/DL (ref 70–125)
POTASSIUM BLD-SCNC: 4.4 MMOL/L (ref 3.5–5)
SODIUM SERPL-SCNC: 140 MMOL/L (ref 136–145)

## 2018-11-05 ENCOUNTER — RECORDS - HEALTHEAST (OUTPATIENT)
Dept: LAB | Facility: CLINIC | Age: 83
End: 2018-11-05

## 2018-11-06 LAB
ANION GAP SERPL CALCULATED.3IONS-SCNC: 10 MMOL/L (ref 5–18)
BUN SERPL-MCNC: 59 MG/DL (ref 8–28)
CALCIUM SERPL-MCNC: 9.3 MG/DL (ref 8.5–10.5)
CHLORIDE BLD-SCNC: 107 MMOL/L (ref 98–107)
CO2 SERPL-SCNC: 23 MMOL/L (ref 22–31)
CREAT SERPL-MCNC: 2.42 MG/DL (ref 0.7–1.3)
GFR SERPL CREATININE-BSD FRML MDRD: 25 ML/MIN/1.73M2
GLUCOSE BLD-MCNC: 99 MG/DL (ref 70–125)
POTASSIUM BLD-SCNC: 4.4 MMOL/L (ref 3.5–5)
SODIUM SERPL-SCNC: 140 MMOL/L (ref 136–145)

## 2018-11-14 ENCOUNTER — RECORDS - HEALTHEAST (OUTPATIENT)
Dept: LAB | Facility: CLINIC | Age: 83
End: 2018-11-14

## 2018-11-14 LAB
ANION GAP SERPL CALCULATED.3IONS-SCNC: 10 MMOL/L (ref 5–18)
BUN SERPL-MCNC: 75 MG/DL (ref 8–28)
CALCIUM SERPL-MCNC: 9.2 MG/DL (ref 8.5–10.5)
CHLORIDE BLD-SCNC: 102 MMOL/L (ref 98–107)
CO2 SERPL-SCNC: 27 MMOL/L (ref 22–31)
CREAT SERPL-MCNC: 3.22 MG/DL (ref 0.7–1.3)
GFR SERPL CREATININE-BSD FRML MDRD: 18 ML/MIN/1.73M2
GLUCOSE BLD-MCNC: 86 MG/DL (ref 70–125)
POTASSIUM BLD-SCNC: 4.4 MMOL/L (ref 3.5–5)
SODIUM SERPL-SCNC: 139 MMOL/L (ref 136–145)

## 2018-11-20 ENCOUNTER — RECORDS - HEALTHEAST (OUTPATIENT)
Dept: LAB | Facility: CLINIC | Age: 83
End: 2018-11-20

## 2018-11-20 LAB
ANION GAP SERPL CALCULATED.3IONS-SCNC: 11 MMOL/L (ref 5–18)
BUN SERPL-MCNC: 66 MG/DL (ref 8–28)
CALCIUM SERPL-MCNC: 8.9 MG/DL (ref 8.5–10.5)
CHLORIDE BLD-SCNC: 107 MMOL/L (ref 98–107)
CO2 SERPL-SCNC: 24 MMOL/L (ref 22–31)
CREAT SERPL-MCNC: 2.54 MG/DL (ref 0.7–1.3)
GFR SERPL CREATININE-BSD FRML MDRD: 24 ML/MIN/1.73M2
GLUCOSE BLD-MCNC: 117 MG/DL (ref 70–125)
POTASSIUM BLD-SCNC: 4.1 MMOL/L (ref 3.5–5)
SODIUM SERPL-SCNC: 142 MMOL/L (ref 136–145)

## 2019-01-14 ENCOUNTER — RECORDS - HEALTHEAST (OUTPATIENT)
Dept: LAB | Facility: CLINIC | Age: 84
End: 2019-01-14

## 2019-01-15 LAB
ALBUMIN SERPL-MCNC: 3.4 G/DL (ref 3.5–5)
ANION GAP SERPL CALCULATED.3IONS-SCNC: 12 MMOL/L (ref 5–18)
BUN SERPL-MCNC: 50 MG/DL (ref 8–28)
CALCIUM SERPL-MCNC: 8.5 MG/DL (ref 8.5–10.5)
CHLORIDE BLD-SCNC: 107 MMOL/L (ref 98–107)
CO2 SERPL-SCNC: 23 MMOL/L (ref 22–31)
CREAT SERPL-MCNC: 2.05 MG/DL (ref 0.7–1.3)
ERYTHROCYTE [DISTWIDTH] IN BLOOD BY AUTOMATED COUNT: 15.9 % (ref 11–14.5)
GFR SERPL CREATININE-BSD FRML MDRD: 30 ML/MIN/1.73M2
GLUCOSE BLD-MCNC: 140 MG/DL (ref 70–125)
HCT VFR BLD AUTO: 32.2 % (ref 40–54)
HGB BLD-MCNC: 9.6 G/DL (ref 14–18)
MAGNESIUM SERPL-MCNC: 2.4 MG/DL (ref 1.8–2.6)
MCH RBC QN AUTO: 28.2 PG (ref 27–34)
MCHC RBC AUTO-ENTMCNC: 29.8 G/DL (ref 32–36)
MCV RBC AUTO: 94 FL (ref 80–100)
PHOSPHATE SERPL-MCNC: 4 MG/DL (ref 2.5–4.5)
PLATELET # BLD AUTO: 230 THOU/UL (ref 140–440)
PMV BLD AUTO: 11.1 FL (ref 8.5–12.5)
POTASSIUM BLD-SCNC: 3.9 MMOL/L (ref 3.5–5)
PTH-INTACT SERPL-MCNC: 161 PG/ML (ref 10–86)
RBC # BLD AUTO: 3.41 MILL/UL (ref 4.4–6.2)
SODIUM SERPL-SCNC: 142 MMOL/L (ref 136–145)
TSH SERPL DL<=0.005 MIU/L-ACNC: 2.54 UIU/ML (ref 0.3–5)
WBC: 7 THOU/UL (ref 4–11)

## 2019-01-21 ENCOUNTER — TRANSFERRED RECORDS (OUTPATIENT)
Dept: HEALTH INFORMATION MANAGEMENT | Facility: CLINIC | Age: 84
End: 2019-01-21

## 2019-01-21 ENCOUNTER — RECORDS - HEALTHEAST (OUTPATIENT)
Dept: LAB | Facility: CLINIC | Age: 84
End: 2019-01-21

## 2019-01-21 LAB
ALBUMIN UR-MCNC: NEGATIVE MG/DL
APPEARANCE UR: CLEAR
BILIRUB UR QL STRIP: NEGATIVE
COLOR UR AUTO: YELLOW
GLUCOSE UR STRIP-MCNC: NEGATIVE MG/DL
HGB UR QL STRIP: NEGATIVE
KETONES UR STRIP-MCNC: NEGATIVE MG/DL
LEUKOCYTE ESTERASE UR QL STRIP: NEGATIVE
NITRATE UR QL: NEGATIVE
PH UR STRIP: 5 [PH] (ref 4.5–8)
SP GR UR STRIP: 1.01 (ref 1–1.03)
UROBILINOGEN UR STRIP-ACNC: NORMAL

## 2019-01-26 ENCOUNTER — APPOINTMENT (OUTPATIENT)
Dept: CT IMAGING | Facility: CLINIC | Age: 84
End: 2019-01-26
Payer: MEDICARE

## 2019-01-26 ENCOUNTER — TRANSFERRED RECORDS (OUTPATIENT)
Dept: HEALTH INFORMATION MANAGEMENT | Facility: CLINIC | Age: 84
End: 2019-01-26

## 2019-01-26 ENCOUNTER — HOSPITAL ENCOUNTER (OUTPATIENT)
Facility: CLINIC | Age: 84
Setting detail: OBSERVATION
Discharge: HOME | End: 2019-01-29
Attending: EMERGENCY MEDICINE | Admitting: INTERNAL MEDICINE
Payer: MEDICARE

## 2019-01-26 DIAGNOSIS — M54.50 ACUTE LOW BACK PAIN WITHOUT SCIATICA, UNSPECIFIED BACK PAIN LATERALITY: ICD-10-CM

## 2019-01-26 DIAGNOSIS — N19 RENAL FAILURE: ICD-10-CM

## 2019-01-26 DIAGNOSIS — I48.0 PAROXYSMAL ATRIAL FIBRILLATION (H): Primary | ICD-10-CM

## 2019-01-26 PROBLEM — M54.9 BACK PAIN: Status: ACTIVE | Noted: 2019-01-26

## 2019-01-26 LAB
ALBUMIN UR-MCNC: NEGATIVE MG/DL
ANION GAP SERPL CALCULATED.3IONS-SCNC: 7 MMOL/L (ref 3–14)
APPEARANCE UR: CLEAR
BASOPHILS # BLD AUTO: 0 10E9/L (ref 0–0.2)
BASOPHILS NFR BLD AUTO: 0.5 %
BILIRUB UR QL STRIP: NEGATIVE
BUN SERPL-MCNC: 59 MG/DL (ref 7–30)
CALCIUM SERPL-MCNC: 8.9 MG/DL (ref 8.5–10.1)
CHLORIDE SERPL-SCNC: 108 MMOL/L (ref 94–109)
CO2 SERPL-SCNC: 27 MMOL/L (ref 20–32)
COLOR UR AUTO: NORMAL
CREAT SERPL-MCNC: 2.03 MG/DL (ref 0.66–1.25)
DIFFERENTIAL METHOD BLD: ABNORMAL
EOSINOPHIL # BLD AUTO: 0.7 10E9/L (ref 0–0.7)
EOSINOPHIL NFR BLD AUTO: 8.2 %
ERYTHROCYTE [DISTWIDTH] IN BLOOD BY AUTOMATED COUNT: 16.3 % (ref 10–15)
GFR SERPL CREATININE-BSD FRML MDRD: 27 ML/MIN/{1.73_M2}
GLUCOSE SERPL-MCNC: 104 MG/DL (ref 70–99)
GLUCOSE UR STRIP-MCNC: NEGATIVE MG/DL
HCT VFR BLD AUTO: 32.2 % (ref 40–53)
HGB BLD-MCNC: 10.1 G/DL (ref 13.3–17.7)
HGB UR QL STRIP: NEGATIVE
IMM GRANULOCYTES # BLD: 0 10E9/L (ref 0–0.4)
IMM GRANULOCYTES NFR BLD: 0.2 %
KETONES UR STRIP-MCNC: NEGATIVE MG/DL
LEUKOCYTE ESTERASE UR QL STRIP: NEGATIVE
LYMPHOCYTES # BLD AUTO: 1.7 10E9/L (ref 0.8–5.3)
LYMPHOCYTES NFR BLD AUTO: 20.8 %
MCH RBC QN AUTO: 28.9 PG (ref 26.5–33)
MCHC RBC AUTO-ENTMCNC: 31.4 G/DL (ref 31.5–36.5)
MCV RBC AUTO: 92 FL (ref 78–100)
MONOCYTES # BLD AUTO: 0.6 10E9/L (ref 0–1.3)
MONOCYTES NFR BLD AUTO: 7.6 %
NEUTROPHILS # BLD AUTO: 5.2 10E9/L (ref 1.6–8.3)
NEUTROPHILS NFR BLD AUTO: 62.7 %
NITRATE UR QL: NEGATIVE
NRBC # BLD AUTO: 0 10*3/UL
NRBC BLD AUTO-RTO: 0 /100
PH UR STRIP: 5 PH (ref 5–7)
PLATELET # BLD AUTO: 210 10E9/L (ref 150–450)
POTASSIUM SERPL-SCNC: 4.2 MMOL/L (ref 3.4–5.3)
RBC # BLD AUTO: 3.5 10E12/L (ref 4.4–5.9)
SODIUM SERPL-SCNC: 142 MMOL/L (ref 133–144)
SOURCE: NORMAL
SP GR UR STRIP: 1.01 (ref 1–1.03)
UROBILINOGEN UR STRIP-MCNC: NORMAL MG/DL (ref 0–2)
WBC # BLD AUTO: 8.3 10E9/L (ref 4–11)

## 2019-01-26 PROCEDURE — A9270 NON-COVERED ITEM OR SERVICE: HCPCS | Mod: GY | Performed by: EMERGENCY MEDICINE

## 2019-01-26 PROCEDURE — 96361 HYDRATE IV INFUSION ADD-ON: CPT

## 2019-01-26 PROCEDURE — 25000132 ZZH RX MED GY IP 250 OP 250 PS 637: Mod: GY | Performed by: INTERNAL MEDICINE

## 2019-01-26 PROCEDURE — 80048 BASIC METABOLIC PNL TOTAL CA: CPT | Performed by: EMERGENCY MEDICINE

## 2019-01-26 PROCEDURE — 25000128 H RX IP 250 OP 636: Performed by: EMERGENCY MEDICINE

## 2019-01-26 PROCEDURE — 25000132 ZZH RX MED GY IP 250 OP 250 PS 637: Mod: GY | Performed by: EMERGENCY MEDICINE

## 2019-01-26 PROCEDURE — 81003 URINALYSIS AUTO W/O SCOPE: CPT | Performed by: EMERGENCY MEDICINE

## 2019-01-26 PROCEDURE — 96360 HYDRATION IV INFUSION INIT: CPT

## 2019-01-26 PROCEDURE — 99220 ZZC INITIAL OBSERVATION CARE,LEVL III: CPT | Performed by: INTERNAL MEDICINE

## 2019-01-26 PROCEDURE — 72131 CT LUMBAR SPINE W/O DYE: CPT

## 2019-01-26 PROCEDURE — 25000128 H RX IP 250 OP 636: Performed by: INTERNAL MEDICINE

## 2019-01-26 PROCEDURE — 99285 EMERGENCY DEPT VISIT HI MDM: CPT | Mod: 25

## 2019-01-26 PROCEDURE — A9270 NON-COVERED ITEM OR SERVICE: HCPCS | Mod: GY | Performed by: INTERNAL MEDICINE

## 2019-01-26 PROCEDURE — G0378 HOSPITAL OBSERVATION PER HR: HCPCS

## 2019-01-26 PROCEDURE — 74176 CT ABD & PELVIS W/O CONTRAST: CPT

## 2019-01-26 PROCEDURE — 85025 COMPLETE CBC W/AUTO DIFF WBC: CPT | Performed by: EMERGENCY MEDICINE

## 2019-01-26 RX ORDER — DIGOXIN 125 MCG
125 TABLET ORAL
Status: DISCONTINUED | OUTPATIENT
Start: 2019-01-28 | End: 2019-01-27

## 2019-01-26 RX ORDER — BUMETANIDE 1 MG/1
1 TABLET ORAL DAILY
Status: ON HOLD | COMMUNITY
End: 2019-01-29

## 2019-01-26 RX ORDER — CARBIDOPA AND LEVODOPA 25; 100 MG/1; MG/1
2 TABLET ORAL 3 TIMES DAILY
Status: DISCONTINUED | OUTPATIENT
Start: 2019-01-26 | End: 2019-01-29 | Stop reason: HOSPADM

## 2019-01-26 RX ORDER — SODIUM CHLORIDE 9 MG/ML
INJECTION, SOLUTION INTRAVENOUS CONTINUOUS
Status: ACTIVE | OUTPATIENT
Start: 2019-01-26 | End: 2019-01-27

## 2019-01-26 RX ORDER — LIDOCAINE 4 G/G
1 PATCH TOPICAL EVERY 24 HOURS
Status: DISCONTINUED | OUTPATIENT
Start: 2019-01-26 | End: 2019-01-29 | Stop reason: HOSPADM

## 2019-01-26 RX ORDER — DORZOLAMIDE HYDROCHLORIDE AND TIMOLOL MALEATE 20; 5 MG/ML; MG/ML
1 SOLUTION/ DROPS OPHTHALMIC 2 TIMES DAILY
Status: DISCONTINUED | OUTPATIENT
Start: 2019-01-26 | End: 2019-01-26

## 2019-01-26 RX ORDER — HYDROCODONE BITARTRATE AND ACETAMINOPHEN 5; 325 MG/1; MG/1
1-2 TABLET ORAL EVERY 4 HOURS PRN
Status: DISCONTINUED | OUTPATIENT
Start: 2019-01-26 | End: 2019-01-27

## 2019-01-26 RX ORDER — NALOXONE HYDROCHLORIDE 0.4 MG/ML
.1-.4 INJECTION, SOLUTION INTRAMUSCULAR; INTRAVENOUS; SUBCUTANEOUS
Status: DISCONTINUED | OUTPATIENT
Start: 2019-01-26 | End: 2019-01-29 | Stop reason: HOSPADM

## 2019-01-26 RX ORDER — ONDANSETRON 4 MG/1
4 TABLET, ORALLY DISINTEGRATING ORAL EVERY 6 HOURS PRN
Status: DISCONTINUED | OUTPATIENT
Start: 2019-01-26 | End: 2019-01-29 | Stop reason: HOSPADM

## 2019-01-26 RX ORDER — LIDOCAINE 4 G/G
1 PATCH TOPICAL ONCE
Status: DISCONTINUED | OUTPATIENT
Start: 2019-01-26 | End: 2019-01-26

## 2019-01-26 RX ORDER — ACETAMINOPHEN 325 MG/1
650 TABLET ORAL EVERY 4 HOURS PRN
Status: DISCONTINUED | OUTPATIENT
Start: 2019-01-26 | End: 2019-01-27

## 2019-01-26 RX ORDER — ACETAMINOPHEN 500 MG
500 TABLET ORAL 3 TIMES DAILY
Status: ON HOLD | COMMUNITY
End: 2019-01-29

## 2019-01-26 RX ORDER — DILTIAZEM HYDROCHLORIDE 180 MG/1
180 CAPSULE, EXTENDED RELEASE ORAL DAILY
Status: DISCONTINUED | OUTPATIENT
Start: 2019-01-27 | End: 2019-01-27

## 2019-01-26 RX ORDER — TRAMADOL HYDROCHLORIDE 50 MG/1
50 TABLET ORAL ONCE
Status: COMPLETED | OUTPATIENT
Start: 2019-01-26 | End: 2019-01-26

## 2019-01-26 RX ORDER — TRAMADOL HYDROCHLORIDE 50 MG/1
50 TABLET ORAL EVERY 6 HOURS PRN
Status: DISCONTINUED | OUTPATIENT
Start: 2019-01-26 | End: 2019-01-29 | Stop reason: HOSPADM

## 2019-01-26 RX ORDER — BUMETANIDE 0.5 MG/1
0.5 TABLET ORAL DAILY
COMMUNITY

## 2019-01-26 RX ORDER — BUMETANIDE 2 MG/1
2 TABLET ORAL SEE ADMIN INSTRUCTIONS
COMMUNITY

## 2019-01-26 RX ORDER — CALCITRIOL 0.25 UG/1
0.25 CAPSULE, LIQUID FILLED ORAL AT BEDTIME
COMMUNITY

## 2019-01-26 RX ORDER — ASPIRIN 81 MG/1
81 TABLET ORAL DAILY
Status: DISCONTINUED | OUTPATIENT
Start: 2019-01-27 | End: 2019-01-29 | Stop reason: HOSPADM

## 2019-01-26 RX ORDER — AMOXICILLIN 250 MG
2 CAPSULE ORAL 2 TIMES DAILY PRN
Status: DISCONTINUED | OUTPATIENT
Start: 2019-01-26 | End: 2019-01-29 | Stop reason: HOSPADM

## 2019-01-26 RX ORDER — LATANOPROST 50 UG/ML
1 SOLUTION/ DROPS OPHTHALMIC AT BEDTIME
Status: DISCONTINUED | OUTPATIENT
Start: 2019-01-26 | End: 2019-01-29 | Stop reason: HOSPADM

## 2019-01-26 RX ORDER — DOCUSATE SODIUM 100 MG/1
100 CAPSULE, LIQUID FILLED ORAL 2 TIMES DAILY
Status: DISCONTINUED | OUTPATIENT
Start: 2019-01-26 | End: 2019-01-29 | Stop reason: HOSPADM

## 2019-01-26 RX ORDER — HYDRALAZINE HYDROCHLORIDE 20 MG/ML
10 INJECTION INTRAMUSCULAR; INTRAVENOUS EVERY 4 HOURS PRN
Status: DISCONTINUED | OUTPATIENT
Start: 2019-01-26 | End: 2019-01-29 | Stop reason: HOSPADM

## 2019-01-26 RX ORDER — ONDANSETRON 2 MG/ML
4 INJECTION INTRAMUSCULAR; INTRAVENOUS EVERY 6 HOURS PRN
Status: DISCONTINUED | OUTPATIENT
Start: 2019-01-26 | End: 2019-01-29 | Stop reason: HOSPADM

## 2019-01-26 RX ORDER — CLOBETASOL PROPIONATE 0.5 MG/G
CREAM TOPICAL 2 TIMES DAILY
COMMUNITY

## 2019-01-26 RX ADMIN — TRAMADOL HYDROCHLORIDE 50 MG: 50 TABLET, COATED ORAL at 17:13

## 2019-01-26 RX ADMIN — LATANOPROST 1 DROP: 50 SOLUTION OPHTHALMIC at 22:10

## 2019-01-26 RX ADMIN — ACETAMINOPHEN 650 MG: 325 TABLET, FILM COATED ORAL at 20:50

## 2019-01-26 RX ADMIN — DORZOLAMIDE HYDROCHLORIDE AND TIMOLOL MALEATE 1 DROP: 20; 5 SOLUTION/ DROPS OPHTHALMIC at 22:15

## 2019-01-26 RX ADMIN — DOCUSATE SODIUM 100 MG: 100 CAPSULE, LIQUID FILLED ORAL at 20:14

## 2019-01-26 RX ADMIN — LIDOCAINE 1 PATCH: 560 PATCH PERCUTANEOUS; TOPICAL; TRANSDERMAL at 18:08

## 2019-01-26 RX ADMIN — SODIUM CHLORIDE 1000 ML: 9 INJECTION, SOLUTION INTRAVENOUS at 17:00

## 2019-01-26 RX ADMIN — OMEPRAZOLE 20 MG: 20 CAPSULE, DELAYED RELEASE ORAL at 20:14

## 2019-01-26 RX ADMIN — CARBIDOPA AND LEVODOPA 2 TABLET: 25; 100 TABLET ORAL at 20:14

## 2019-01-26 RX ADMIN — SODIUM CHLORIDE: 9 INJECTION, SOLUTION INTRAVENOUS at 20:37

## 2019-01-26 ASSESSMENT — ENCOUNTER SYMPTOMS
SHORTNESS OF BREATH: 0
DIARRHEA: 0
FREQUENCY: 0
ABDOMINAL PAIN: 0
VOMITING: 0
FEVER: 0
DIFFICULTY URINATING: 1

## 2019-01-26 ASSESSMENT — MIFFLIN-ST. JEOR: SCORE: 1325.26

## 2019-01-26 NOTE — ED PROVIDER NOTES
History     Chief Complaint:  Back pain    The history is provided by the patient and a relative.      Loco Hawkins is a 89 year old male with a history of atrial fibrillation, HTN, hyperlipidemia, TIA, dementia, Parkinson's disease who presents to the emergency department  for evaluation of back pain. To note, the patient is not on blood thinners. For the past week, the patient had the onset of bilateral low back pain, intermittently bothersome at first and worse in the morning but would improve throughout the day as he moved around. The pain has now progressed to be more constant, sharp in nature, more localized to the right side, and worse when trying to sit up in bed in the past 48 hours.  Friend also notes the patient has had urgency but an inability to urinate once getting to the bathroom and this has become worse than his baseline urinary issues, which include: an inability to urinate on his own, notably needing to wait until he has extreme urges before he can urinate in a urinal; the patient does not self-cath. The patient has been seen by his care facility provider and urine analysis was unremarkable on 1/21. The patient has also been scheduled to see his primary care provider in the coming days. The continuation of the back pain and its worsening severity prompted the patient and friend to seek evaluation here in the emergency department. He is without vomiting, diarrhea, abdominal pain, and fevers. No chest pain or shortness of breath. No recent colds or illnesses, including pneumonia. He last urinated a small amount 20 minutes ago, around 1420 this afternoon. The patient always ambulates with a wheelchair; he is otherwise non-mobile.     Family also notes the patient has swelling in his legs and is playing a delicate balance with managing the leg swelling given his cardiac and renal history. No problems regarding the patient's history of prostate cancer.       Allergies:  Penicillins  "    Medications:    Aspirin  Sinemet  Digoxin  Diltiazem  Docusate  Dorzolamide-timolol  Latanoprost  Senna-docusate  Triamcinolone acetonide 0.05% ointment  Vitamin D     Past Medical History:    Dementia  Parkinson's disease  SVT  Bilateral lower extremity edema  Chronic constipation  Chronic neck pain  Glaucoma  HTN  Prostate cancer  Hyperlipidemia  Atrial fibrillation  GERD  Paralysis agitans  TIA  Pseudophakia  Hearing loss  Macular degeneration    Past Surgical History:    Appendectomy  Prostatectomy  Cataract Removal  Eye surgery    Family History:    Liver cancer  Macular degeneration    Social History:  Negative for tobacco use.  Negative for alcohol use.  Negative for drug use.  Patient resides in a memory care facility.  Patient presents with his son.  Marital Status:       Review of Systems   Constitutional: Negative for fever.   Respiratory: Negative for shortness of breath.    Cardiovascular: Negative for chest pain.   Gastrointestinal: Negative for abdominal pain, diarrhea and vomiting.   Genitourinary: Positive for difficulty urinating and urgency. Negative for frequency.   All other systems reviewed and are negative.    Physical Exam     Patient Vitals for the past 24 hrs:   BP Temp Temp src Pulse Resp SpO2 Height Weight   01/26/19 1745 -- -- -- -- -- 99 % -- --   01/26/19 1713 -- -- -- -- -- 98 % -- --   01/26/19 1500 164/63 -- -- (!) 49 -- 100 % -- --   01/26/19 1430 148/58 -- -- (!) 47 -- 98 % -- --   01/26/19 1410 (!) 171/108 -- -- 56 -- 99 % -- --   01/26/19 1349 153/46 97.6  F (36.4  C) Oral (!) 47 16 100 % 1.727 m (5' 8\") 72.6 kg (160 lb)     Physical Exam  Physical Exam   Constitutional:  Very pleasant 89 year old man lying supine in bed.   HENT:   Mouth/Throat:   Oropharynx is clear and moist.   Eyes:    Conjunctivae normal and EOM are normal. Pupils are equal, round, and reactive to light.   Neck:    Normal range of motion.   Cardiovascular: Normal rate, regular rhythm and normal " heart sounds.  Exam reveals no gallop and no friction rub.  No murmur heard.  Pulmonary/Chest:  Effort normal and breath sounds normal. Patient has no wheezes. Patient has no rales.   Abdominal:   Soft. Bowel sounds are normal. Patient exhibits no mass. There is focal no tenderness. There is no rebound and no guarding.   Musculoskeletal:  No focal vertebral tenderness. There is no erythema, ecchymosis, or edema of the lumbar area. No reproducible tenderness of the spine but pain elicited when the patient tries to sit up. 1+ patellar reflexes bilaterally. No focal weakness. No sensory deficits.   Neurological:   Patient is alert and oriented to person, place. Mild dementia with memory impairment and Parkinson's. Patient is generally weak. No cranial nerve deficit or sensory deficit. GCS 15  Skin:   Skin is warm and dry. No rash noted. No erythema.   Psychiatric:   Patient has a normal mood.   Emergency Department Course   Imaging:  Radiographic findings were communicated with the patient and family who voiced understanding of the findings.    CT Abdomen/Pelvis without IV contrast:   1. No acute abnormality identified.  2. A few tiny pulmonary nodules, none measuring more than 6 mm.  3. Small hiatal hernia.  4. Cholelithiasis.  5. Right inguinal hernia containing small bowel. No evidence of bowel  obstruction.    Recommendations for an incidental lung nodule < 6mm :    Low risk patients: No routine follow-up.    High risk patients: Optional follow-up CT at 12 months; if  unchanged, no further follow-up.    *Low Risk: Minimal or absent history of smoking or other known risk  factors.  *Nonsolid (ground glass) or partly solid nodules may require longer  follow-up to exclude indolent adenocarcinoma.  *Recommendations based on Guidelines for the Management of Incidental  Pulmonary Nodules Detected at CT: From the Fleischner Society 2017,  Radiology 2017. As per radiology.    CT Lumbar spine w/o contrast:  1. No evidence  for fracture or any posterior malalignment.  2. Mild degenerative disc and facet disease as described. As per radiology.     Laboratory:  UA with micro: All negative  Urine culture: In process    CBC: WBC: 8.3, HGB: 10.1 (L), PLT: 210  BMP: Glucose 104 (H), Urea Nitrogen 59 (H), Creatinine 2.03 (H), GFR 27 (L), o/w WNL    Interventions:  1700 NS 1L IV  1713 Tramadol 50 mg tablet   1808 Lidocaine 4% patch 1 patch applied    Emergency Department Course:  1410 Nursing notes and vitals reviewed.  I performed an exam of the patient as documented above.     IV inserted. Medicine administered as documented above. Blood drawn. This was sent to the lab for further testing, results above.    The patient provided a urine sample here in the emergency department. This was sent for laboratory testing, findings above.     The patient was sent for a abdomen pelvis CT and lumbar spine CT while in the emergency department, findings above.     1650 I rechecked the patient and discussed the results of his workup thus far.     1654  I consulted with Dr. Viveros of the hospitalist services. They are in agreement to accept the patient for admission.    Findings and plan explained to the Patient and son who consents to admission. Discussed the patient with Dr. Viveros, who will admit the patient to an observation bed for further monitoring, evaluation, and treatment.    Impression & Plan    Medical Decision Making:  Loco Hawkins is a 89 year old gentlemen who was sent over from his care facility for evaluation of back pain. They did a run a urine analysis five days ago that was negative. On his physical exam, he was more concerned about his low back pain which was atraumatic in nature but worsening with movements. His exam did not show any concerning findings of caudal equina. He did not have radicular symptoms. No signs of trauma.     Regarding his urinary symptoms, a post void residual was performed and he had 40 ml of urine in  his bladder so he is no significantly retaining urine. His urine analysis furthermore shows no evidence of hematuria or infection. His white blood count and hemoglobin are baseline for him. He has worsening renal failure. His baseline creatinine appears to be 1.1 to 1.3 but is 2.0 today with moderately increased BUN and decreased GFR. He is receiving IV fluids.     CT scan of his abdomen and reconstructive images of his lumbar spine show no acute kidney stone, aneurysm, infection, inflammation, bony abnormalities. He has mild degenerative disease. I suspect at this time, his pain is more musculoskeletal in origin. He has had no recent infectious etiologies like bacteremia, discitis osteomyelitis. He received tramadol for his discomfort here but I will admit for his worsening renal failure and pain control.      Critical Care time:  none    Diagnosis:    ICD-10-CM    1. Renal failure N19    2.      Lumbar back pain    Disposition:  Admitted to Dr. Viveros to an observation bed    Scribe Disclosure:  I, Clarissa Sullivan, am serving as a scribe on 1/26/2019 at 2:10 PM to personally document services performed by Haley Gaspar MD based on my observations and the provider's statements to me.     Clarissa Sullivan  1/26/2019    EMERGENCY DEPARTMENT       Haley Gaspar MD  01/26/19 [id number]

## 2019-01-26 NOTE — PLAN OF CARE
RECEIVING UNIT ED HANDOFF REVIEW    ED Nurse Handoff Report was reviewed by: Reji Payton on January 26, 2019 at 5:53 PM

## 2019-01-26 NOTE — ED NOTES
"[facility]  ED Nurse Handoff Report    ED Chief complaint: Back Pain (pt has had back pain for 3 days and frequency with little output)      ED Diagnosis:   Final diagnoses:   Renal failure       Code Status: DNR / DNI    Allergies:   Allergies   Allergen Reactions     Penicillins Unknown       Activity level - Baseline/Home:  Pt reports getting around with WC    Activity Level - Current:   Unable to Assess     Needed?: No    Isolation: No  Infection: Not Applicable  Bariatric?: No    Vital Signs:   Vitals:    01/26/19 1410 01/26/19 1430 01/26/19 1500 01/26/19 1713   BP: (!) 171/108 148/58 164/63    Pulse: 56 (!) 47 (!) 49    Resp:       Temp:       TempSrc:       SpO2: 99% 98% 100% 98%   Weight:       Height:           Cardiac Rhythm: ,        Pain level: 0-10 Pain Scale: 5    Is this patient confused?: No   Does this patient have a guardian?  No         If yes, is there guardianship documents in the Epic \"Code/ACP\" activity?  N/A         Guardian Notified?  N/A  [place] - Suicide Severity Rating Scale Completed?  Yes  If yes, what color did the patient score?  White    Patient Report: Initial Complaint: Loco Hawkins is a 89 year old male who presents to the emergency department for an evaluation of back pain.  Focused Assessment: Cardiac: WDL  Resp: WDL  Neuro: A&Ox4  Musculoskeletal: Lower back pain  Genitourinary: Urgency, dysuria  Tests Performed: Labs, CT  Abnormal Results: See labs and imaging  Treatments provided: NS 1000ml bolus, tramadol 50mg, lido patch to be placed    Family Comments: family at bedside    OBS brochure/video discussed/provided to patient/family: Yes              Name of person given brochure if not patient: pt              Relationship to patient: pt    ED Medications:   Medications   Lidocaine (LIDOCARE) 4 % Patch 1 patch (not administered)     And   lidocaine patch REMOVAL (not administered)     And   lidocaine patch in PLACE (not administered)   0.9% " sodium chloride BOLUS (1,000 mLs Intravenous New Bag 1/26/19 1700)   traMADol (ULTRAM) tablet 50 mg (50 mg Oral Given 1/26/19 1713)       Drips infusing?:  Yes, NS    For the majority of the shift this patient was Green.   Interventions performed were n/a.    Severe Sepsis OR Septic Shock Diagnosis Present: No    To be done/followed up on inpatient unit:  [facility] care    ED NURSE PHONE NUMBER: *[phone]

## 2019-01-27 ENCOUNTER — APPOINTMENT (OUTPATIENT)
Dept: PHYSICAL THERAPY | Facility: CLINIC | Age: 84
End: 2019-01-27
Payer: MEDICARE

## 2019-01-27 ENCOUNTER — APPOINTMENT (OUTPATIENT)
Dept: MRI IMAGING | Facility: CLINIC | Age: 84
End: 2019-01-27
Payer: MEDICARE

## 2019-01-27 LAB
ALBUMIN SERPL-MCNC: 2.7 G/DL (ref 3.4–5)
ALP SERPL-CCNC: 41 U/L (ref 40–150)
ALT SERPL W P-5'-P-CCNC: 7 U/L (ref 0–70)
ANION GAP SERPL CALCULATED.3IONS-SCNC: 5 MMOL/L (ref 3–14)
AST SERPL W P-5'-P-CCNC: 22 U/L (ref 0–45)
BASOPHILS # BLD AUTO: 0 10E9/L (ref 0–0.2)
BASOPHILS NFR BLD AUTO: 0.5 %
BILIRUB DIRECT SERPL-MCNC: 0.1 MG/DL (ref 0–0.2)
BILIRUB SERPL-MCNC: 0.4 MG/DL (ref 0.2–1.3)
BUN SERPL-MCNC: 48 MG/DL (ref 7–30)
CALCIUM SERPL-MCNC: 8 MG/DL (ref 8.5–10.1)
CHLORIDE SERPL-SCNC: 110 MMOL/L (ref 94–109)
CO2 SERPL-SCNC: 26 MMOL/L (ref 20–32)
CREAT SERPL-MCNC: 1.8 MG/DL (ref 0.66–1.25)
CRP SERPL-MCNC: 25.1 MG/L (ref 0–8)
DIFFERENTIAL METHOD BLD: ABNORMAL
DIGOXIN SERPL-MCNC: 1.3 UG/L (ref 0.5–2)
DIGOXIN SERPL-MCNC: NORMAL UG/L (ref 0.5–2)
EOSINOPHIL # BLD AUTO: 0.4 10E9/L (ref 0–0.7)
EOSINOPHIL NFR BLD AUTO: 4.1 %
ERYTHROCYTE [DISTWIDTH] IN BLOOD BY AUTOMATED COUNT: 16.3 % (ref 10–15)
ERYTHROCYTE [SEDIMENTATION RATE] IN BLOOD BY WESTERGREN METHOD: 32 MM/H (ref 0–20)
GFR SERPL CREATININE-BSD FRML MDRD: 31 ML/MIN/{1.73_M2}
GLUCOSE SERPL-MCNC: 96 MG/DL (ref 70–99)
HCT VFR BLD AUTO: 31.2 % (ref 40–53)
HGB BLD-MCNC: 9.7 G/DL (ref 13.3–17.7)
IMM GRANULOCYTES # BLD: 0 10E9/L (ref 0–0.4)
IMM GRANULOCYTES NFR BLD: 0.2 %
LIPASE SERPL-CCNC: 78 U/L (ref 73–393)
LYMPHOCYTES # BLD AUTO: 1.4 10E9/L (ref 0.8–5.3)
LYMPHOCYTES NFR BLD AUTO: 15.4 %
MCH RBC QN AUTO: 28.4 PG (ref 26.5–33)
MCHC RBC AUTO-ENTMCNC: 31.1 G/DL (ref 31.5–36.5)
MCV RBC AUTO: 91 FL (ref 78–100)
MONOCYTES # BLD AUTO: 0.6 10E9/L (ref 0–1.3)
MONOCYTES NFR BLD AUTO: 7.3 %
NEUTROPHILS # BLD AUTO: 6.4 10E9/L (ref 1.6–8.3)
NEUTROPHILS NFR BLD AUTO: 72.5 %
NRBC # BLD AUTO: 0 10*3/UL
NRBC BLD AUTO-RTO: 0 /100
PLATELET # BLD AUTO: 214 10E9/L (ref 150–450)
POTASSIUM SERPL-SCNC: 3.9 MMOL/L (ref 3.4–5.3)
PROT SERPL-MCNC: 6.8 G/DL (ref 6.8–8.8)
RBC # BLD AUTO: 3.42 10E12/L (ref 4.4–5.9)
SODIUM SERPL-SCNC: 141 MMOL/L (ref 133–144)
T4 FREE SERPL-MCNC: 0.87 NG/DL (ref 0.76–1.46)
TSH SERPL DL<=0.005 MIU/L-ACNC: 9.14 MU/L (ref 0.4–4)
WBC # BLD AUTO: 8.8 10E9/L (ref 4–11)

## 2019-01-27 PROCEDURE — 93005 ELECTROCARDIOGRAM TRACING: CPT

## 2019-01-27 PROCEDURE — 25000132 ZZH RX MED GY IP 250 OP 250 PS 637: Mod: GY | Performed by: INTERNAL MEDICINE

## 2019-01-27 PROCEDURE — 40000893 ZZH STATISTIC PT IP EVAL DEFER

## 2019-01-27 PROCEDURE — 86140 C-REACTIVE PROTEIN: CPT | Performed by: INTERNAL MEDICINE

## 2019-01-27 PROCEDURE — 40000193 ZZH STATISTIC PT WARD VISIT

## 2019-01-27 PROCEDURE — 85025 COMPLETE CBC W/AUTO DIFF WBC: CPT | Performed by: INTERNAL MEDICINE

## 2019-01-27 PROCEDURE — 84443 ASSAY THYROID STIM HORMONE: CPT | Performed by: INTERNAL MEDICINE

## 2019-01-27 PROCEDURE — 25000128 H RX IP 250 OP 636: Performed by: PHYSICIAN ASSISTANT

## 2019-01-27 PROCEDURE — 36415 COLL VENOUS BLD VENIPUNCTURE: CPT | Performed by: INTERNAL MEDICINE

## 2019-01-27 PROCEDURE — 80048 BASIC METABOLIC PNL TOTAL CA: CPT | Performed by: INTERNAL MEDICINE

## 2019-01-27 PROCEDURE — 84439 ASSAY OF FREE THYROXINE: CPT | Performed by: INTERNAL MEDICINE

## 2019-01-27 PROCEDURE — 83690 ASSAY OF LIPASE: CPT | Performed by: INTERNAL MEDICINE

## 2019-01-27 PROCEDURE — 80162 ASSAY OF DIGOXIN TOTAL: CPT | Performed by: INTERNAL MEDICINE

## 2019-01-27 PROCEDURE — 80076 HEPATIC FUNCTION PANEL: CPT | Performed by: INTERNAL MEDICINE

## 2019-01-27 PROCEDURE — 40000894 ZZH STATISTIC OT IP EVAL DEFER: Performed by: OCCUPATIONAL THERAPIST

## 2019-01-27 PROCEDURE — 72148 MRI LUMBAR SPINE W/O DYE: CPT

## 2019-01-27 PROCEDURE — 25000132 ZZH RX MED GY IP 250 OP 250 PS 637: Mod: GY | Performed by: EMERGENCY MEDICINE

## 2019-01-27 PROCEDURE — A9270 NON-COVERED ITEM OR SERVICE: HCPCS | Mod: GY | Performed by: PHYSICIAN ASSISTANT

## 2019-01-27 PROCEDURE — 97530 THERAPEUTIC ACTIVITIES: CPT | Mod: GP

## 2019-01-27 PROCEDURE — 99226 ZZC SUBSEQUENT OBSERVATION CARE,LEVEL III: CPT | Performed by: PHYSICIAN ASSISTANT

## 2019-01-27 PROCEDURE — A9270 NON-COVERED ITEM OR SERVICE: HCPCS | Mod: GY | Performed by: INTERNAL MEDICINE

## 2019-01-27 PROCEDURE — G0378 HOSPITAL OBSERVATION PER HR: HCPCS

## 2019-01-27 PROCEDURE — 97161 PT EVAL LOW COMPLEX 20 MIN: CPT | Mod: GP

## 2019-01-27 PROCEDURE — 25000132 ZZH RX MED GY IP 250 OP 250 PS 637: Mod: GY | Performed by: PHYSICIAN ASSISTANT

## 2019-01-27 PROCEDURE — 85652 RBC SED RATE AUTOMATED: CPT | Performed by: INTERNAL MEDICINE

## 2019-01-27 RX ORDER — ACETAMINOPHEN 500 MG
1000 TABLET ORAL
Status: DISCONTINUED | OUTPATIENT
Start: 2019-01-27 | End: 2019-01-29 | Stop reason: HOSPADM

## 2019-01-27 RX ORDER — SODIUM CHLORIDE 9 MG/ML
INJECTION, SOLUTION INTRAVENOUS CONTINUOUS
Status: ACTIVE | OUTPATIENT
Start: 2019-01-27 | End: 2019-01-27

## 2019-01-27 RX ORDER — BISACODYL 10 MG
10 SUPPOSITORY, RECTAL RECTAL DAILY PRN
Status: DISCONTINUED | OUTPATIENT
Start: 2019-01-27 | End: 2019-01-29 | Stop reason: HOSPADM

## 2019-01-27 RX ORDER — AMOXICILLIN 250 MG
2 CAPSULE ORAL 2 TIMES DAILY
Status: DISCONTINUED | OUTPATIENT
Start: 2019-01-27 | End: 2019-01-27

## 2019-01-27 RX ORDER — POLYETHYLENE GLYCOL 3350 17 G/17G
17 POWDER, FOR SOLUTION ORAL DAILY PRN
Status: DISCONTINUED | OUTPATIENT
Start: 2019-01-27 | End: 2019-01-29 | Stop reason: HOSPADM

## 2019-01-27 RX ORDER — AMOXICILLIN 250 MG
1 CAPSULE ORAL 2 TIMES DAILY
Status: DISCONTINUED | OUTPATIENT
Start: 2019-01-27 | End: 2019-01-27

## 2019-01-27 RX ORDER — SODIUM CHLORIDE 9 MG/ML
INJECTION, SOLUTION INTRAVENOUS CONTINUOUS
Status: DISCONTINUED | OUTPATIENT
Start: 2019-01-27 | End: 2019-01-27

## 2019-01-27 RX ADMIN — DOCUSATE SODIUM 100 MG: 100 CAPSULE, LIQUID FILLED ORAL at 08:31

## 2019-01-27 RX ADMIN — CARBIDOPA AND LEVODOPA 2 TABLET: 25; 100 TABLET ORAL at 13:24

## 2019-01-27 RX ADMIN — LIDOCAINE 1 PATCH: 560 PATCH PERCUTANEOUS; TOPICAL; TRANSDERMAL at 17:41

## 2019-01-27 RX ADMIN — CARBIDOPA AND LEVODOPA 2 TABLET: 25; 100 TABLET ORAL at 20:29

## 2019-01-27 RX ADMIN — ACETAMINOPHEN 1000 MG: 500 TABLET, FILM COATED ORAL at 17:41

## 2019-01-27 RX ADMIN — CARBIDOPA AND LEVODOPA 2 TABLET: 25; 100 TABLET ORAL at 08:31

## 2019-01-27 RX ADMIN — OMEPRAZOLE 20 MG: 20 CAPSULE, DELAYED RELEASE ORAL at 20:29

## 2019-01-27 RX ADMIN — ASPIRIN 81 MG: 81 TABLET, COATED ORAL at 08:31

## 2019-01-27 RX ADMIN — DORZOLAMIDE HYDROCHLORIDE AND TIMOLOL MALEATE 1 DROP: 20; 5 SOLUTION/ DROPS OPHTHALMIC at 08:32

## 2019-01-27 RX ADMIN — LATANOPROST 1 DROP: 50 SOLUTION OPHTHALMIC at 22:30

## 2019-01-27 RX ADMIN — SODIUM CHLORIDE: 9 INJECTION, SOLUTION INTRAVENOUS at 08:45

## 2019-01-27 RX ADMIN — OMEPRAZOLE 20 MG: 20 CAPSULE, DELAYED RELEASE ORAL at 08:31

## 2019-01-27 RX ADMIN — DORZOLAMIDE HYDROCHLORIDE AND TIMOLOL MALEATE 1 DROP: 20; 5 SOLUTION/ DROPS OPHTHALMIC at 20:29

## 2019-01-27 RX ADMIN — ACETAMINOPHEN 1000 MG: 500 TABLET, FILM COATED ORAL at 10:17

## 2019-01-27 RX ADMIN — DOCUSATE SODIUM 100 MG: 100 CAPSULE, LIQUID FILLED ORAL at 20:29

## 2019-01-27 RX ADMIN — ACETAMINOPHEN 650 MG: 325 TABLET, FILM COATED ORAL at 01:52

## 2019-01-27 ASSESSMENT — MIFFLIN-ST. JEOR: SCORE: 1323.44

## 2019-01-27 NOTE — PLAN OF CARE
Discharge Planner PT   Patient plan for discharge: Back to [facility] with assistance   Current status: Orders received, chart reviewed, PT evaluation completed and treatment initiated. Patient admitted on 1/26/19 for evaluation of back pain. Patient with history of Parkinson's and dementia. Patient lives in an [facility] and is w/c bound at baseline. Patient is able to transfer to and from w/c with A x 1 if needed but can sometimes complete independently. Patient notes as of lately he has been receiving A more times than not for mobility. Patient also has assistance with bathing, dressing, and toileting.     Patient supine in bed upon arrival of therapist, nephew present for session. Educated on role of PT and PT POC while admitted under observation status. Supine>sit with mod A x 1 and cues for use of hand rails. Patient able to complete with extended time 2/2 to low back pain with movement. Patient able to sit at EOB for approximately 15 minutes with SBA for balance initially then with supervision. Sit>stand with Mod A x 1 completed with patient able to clear bottom from bed, but difficulty standing fully upright 2/2 to pain in low back. Patient able to scoot self up towards HOB and then returned to supine with Mod A for management of B LE. Patient in supine at end of session with all needs in reach and bed alarm on. Will defer further skilled PT intervention to next level of care as patient admitted under observation status. Will discharge from acute PT and complete orders at this time.   Barriers to return to prior living situation: Current level of assist   Recommendations for discharge: Back to [facility] with A x 1-2 for all mobility and transfers,  PT   Rationale for recommendations: Patient from [facility] and w/c bound at baseline. Per discussion with nephew and patient, patient has been receiving A with mobility and [facility] and is able to continue to receive A as needed. Patient safe to discharge back to [facility], if [facility] is able to  provide level of assistance needed. Patient would also benefit from skilled  PT to further address functional limitations and strength and independence with transfers to and from w/c. If [facility] unable to provide level of assist needed, patient would require discharge to TCU to address above functional limitations. Will defer further skilled PT intervention to next level of care as patient admitted under observation status. Will discharge from acute PT and complete orders at this time.        Entered by: Dai Augustin 01/27/2019 12:29 PM      Physical Therapy Discharge Summary    Reason for therapy discharge:    Defer further skilled PT intervention to next level of care as patient admitted under observation status    Progress towards therapy goal(s). See goals on Care Plan in [facility] electronic [facility] record for goal details.  Goals not met.  Barriers to achieving goals:   defer further skilled PT intervention to next level of care as patient admitted under observation status. .    Therapy recommendation(s):    Continued therapy is recommended.  Rationale/Recommendations: Patient would benefit from continued skilled therapy to further improve strength, balance, and independence with mobility and ambulation to address functional limitations and decrease falls risk.

## 2019-01-27 NOTE — PLAN OF CARE
Pt arrived at 1830: Alert and oriented x4. BP elevated, other VEE. Pain rated 4/10 on arrival. Is w/c bound at baseline per family. From assisted living. Has hospital bed at home. Will continue to monitor.

## 2019-01-27 NOTE — PROGRESS NOTES
Note TSH 9.14 (H), T4 0.87 (WNL). Not likely contributing to bradycardia. Pt will need to repeat thyroid studies outpatient in 4-6 weeks.

## 2019-01-27 NOTE — PLAN OF CARE
PRIMARY DIAGNOSIS: ACUTE PAIN  OUTPATIENT/OBSERVATION GOALS TO BE MET BEFORE DISCHARGE:  1. Pain Status: Improved-controlled with oral pain medications.     2. Return to near baseline physical activity: Yes     3. Cleared for discharge by consultants (if involved): No     Discharge Planner Nurse   Safe discharge environment identified: Yes  Barriers to discharge: No

## 2019-01-27 NOTE — PLAN OF CARE
PRIMARY DIAGNOSIS: ACUTE PAIN  OUTPATIENT/OBSERVATION GOALS TO BE MET BEFORE DISCHARGE:  1. Pain Status: Improved-controlled with oral pain medications.    2. Return to near baseline physical activity: Yes    3. Cleared for discharge by consultants (if involved): No    Discharge Planner Nurse   Safe discharge environment identified: Yes  Barriers to discharge: No       Entered by: Alpa Briones 01/27/2019      Please review provider order for any additional goals.   Nurse to notify provider when observation goals have been met and patient is ready for discharge.

## 2019-01-27 NOTE — PLAN OF CARE
Discharge Planner OT   Patient plan for discharge: back to skilled nursing with assistance  Current status: order received and chart reviewed.  Per discussion with PT, no skilled OT needs identified.  Patient has assist for ADLs as needed and is wheelchair bound at baseline. He is planning to return to skilled nursing with HHPT/OT.   Barriers to return to prior living situation: defer to PT  Recommendations for discharge: defer to PT  Rationale for recommendations: no IP skilled OT needs identified.  Will defer OT needs to next level of care due to observation status.  Will complete orders.        Entered by: VANI WILLIAMSON 01/27/2019 12:45 PM

## 2019-01-27 NOTE — PROGRESS NOTES
01/27/19 1200   Quick Adds   Type of Visit Initial PT Evaluation   Living Environment   Lives With facility resident   Living Arrangements assisted living   Home Accessibility no concerns   Self-Care   Usual Activity Tolerance fair   Current Activity Tolerance fair   Equipment Currently Used at Home walker, rolling;wheelchair, manual   Functional Level Prior   Ambulation 3-->assistive equipment and person   Transferring 3-->assistive equipment and person   Toileting 3-->assistive equipment and person   Cognition 0 - no cognition issues reported   Fall history within last six months yes   Which of the above functional risks had a recent onset or change? transferring   Prior Functional Level Comment Per discussion with nephew: patient lives in [facility] and is able to have assistance if needed (has pendent button to push); per patient, patient has been receiveing A x 1 for getting in and our of bed and pivoting to the chair; states he feels like he is getting A for mobility more times than not.    General Information   Onset of Illness/Injury or Date of Surgery - Date 01/26/19   Referring Physician Stef Viveros MD   Patient/Family Goals Statement Return back to [facility] with assistance    Pertinent History of Current Problem (include personal factors and/or comorbidities that impact the POC) Patient admitted on 1/26/19 for evaluation of back pain. Patient with PMH of  atrial fibrillation, hypertension, hyperlipidemia, Parkinson disease, dementia and glaucoma.    Precautions/Limitations fall precautions   Weight-Bearing Status - LLE full weight-bearing   Weight-Bearing Status - RLE full weight-bearing   General Observations Patient supine in bed upon arrival of therapist, nephew present; agreeable to working with PT    Cognitive Status Examination   Orientation orientation to person, place and time   Level of Consciousness alert   Follows Commands and Answers Questions 100% of the time;able to follow multistep  "instructions   Pain Assessment   Patient Currently in Pain Yes, see Vital Sign flowsheet  (not rated on scale of 0-10)   Integumentary/Edema   Integumentary/Edema Comments B lymphadema wraps in place on LEs    Posture    Posture Forward head position;Kyphosis   Range of Motion (ROM)   ROM Comment B LE ROM WNL   Strength   Strength Comments Not formally assessed but at least 3/5 with standing at EOB with therapist assist; generally weak    Bed Mobility   Bed Mobility Comments Mod A x 1    Transfer Skills   Transfer Comments Mod A x 1    Gait   Gait Comments Patient does not ambulate at baseline    Balance   Balance Comments Sitting balance at EOB requiring SBA initially then progressing to supervision; unsteady in standing    General Therapy Interventions   Planned Therapy Interventions bed mobility training;transfer training   Clinical Impression   Criteria for Skilled Therapeutic Intervention yes, treatment indicated   PT Diagnosis Impaired mobility    Influenced by the following impairments pain, weakness   Functional limitations due to impairments bed mobility and transfers   Clinical Presentation Stable/Uncomplicated   Clinical Presentation Rationale Based on PMH, current presentation, and social support    Clinical Decision Making (Complexity) Low complexity   Therapy Frequency` daily   Predicted Duration of Therapy Intervention (days/wks) 1 day    Anticipated Discharge Disposition (back to [facility] with A for all mobility )   Risk & Benefits of therapy have been explained Yes   Patient, Family & other staff in agreement with plan of care Yes   [facility] [employer] \"6 Clicks\"   2016, Trustees of [facility], under license to [employer].  All rights reserved.   6 Clicks Short Forms Basic Mobility Inpatient Short Form   [facility] [employer]  \"6 Clicks\" V.2 Basic Mobility Inpatient Short Form   1. Turning from your back to your side while in a flat bed without using bedrails? 3 - A Little   2. Moving " from lying on your back to sitting on the side of a flat bed without using bedrails? 2 - A Lot   3. Moving to and from a bed to a chair (including a wheelchair)? 2 - A Lot   4. Standing up from a chair using your arms (e.g., wheelchair, or bedside chair)? 2 - A Lot   5. To walk in hospital room? 1 - Total   6. Climbing 3-5 steps with a railing? 1 - Total   Basic Mobility Raw Score (Score out of 24.Lower scores equate to lower levels of function) 11   Total Evaluation Time   Total Evaluation Time (Minutes) 10

## 2019-01-27 NOTE — PLAN OF CARE
PRIMARY DIAGNOSIS: ACUTE PAIN  OUTPATIENT/OBSERVATION GOALS TO BE MET BEFORE DISCHARGE:  1. Pain Status: Improved-controlled with oral pain medications.    2. Return to near baseline physical activity: Yes    3. Cleared for discharge by consultants (if involved): No    Discharge Planner Nurse   Safe discharge environment identified: Yes  Barriers to discharge: No       Entered by: Alpa Briones 01/27/2019 5:56 AM     Please review provider order for any additional goals.   Nurse to notify provider when observation goals have been met and patient is ready for discharge.

## 2019-01-27 NOTE — PLAN OF CARE
PRIMARY DIAGNOSIS: ACUTE PAIN  OUTPATIENT/OBSERVATION GOALS TO BE MET BEFORE DISCHARGE:  1. Pain Status: Improved-controlled with oral pain medications.    2. Return to near baseline physical activity: Yes    3. Cleared for discharge by consultants (if involved): No    Discharge Planner Nurse   Safe discharge environment identified: Yes  Barriers to discharge: No       Entered by: Alpa Briones 01/27/2019 5:56 AM     Please review provider order for any additional goals.   Nurse to notify provider when observation goals have been met and patient is ready for discharge.    Pt A&Ox4.  VSS on RA, except bradycardic and HTN at times.  Pain managed w/Tylenol and lidocaine patch.  Unable to move RLE foot.  Edema wraps in place.  Wheelchair-bound at baseline.  Frequent voiding small amounts.  PIV infusing NS at 100mL/hr.  MRI completed during the night, awaiting results.  Creatinine 2.03.  Regular diet.  Nursing to continue to monitor.

## 2019-01-27 NOTE — H&P
"[facility]    History and Physical  Hospitalist       Date of Admission:  1/26/2019  Date of Service (when I saw the patient): 01/26/19    Assessment & Plan   Loco Hawkins is a 89 year old male who presents with back pain    Back pain  Etiology is unclear at this time. Appears to be in area of mid-lumbar spine, no radiation, no tender to palpation. CT without contrast in ED unremarkable, reconstruction with CT shows no spine abnormality. Appears largely ok with rest but quite a bit of pain with minimal movement.   - observation  - pain control with prn, acetaminophen, prn tramadol, prn norco  - PT/ OT assessment, baseline is wheelchair bound  - will order MRI w/o contrast to look at spine/ back  - check CRP/ ESR  - try lidoderm patch    Urinary frequency  Has feelings that he has to go frequently. Voiding small amounts. Bladder scan in the ED with minimal urine. Urinalysis is bland (checked here and at care facility)  -Bladder scan every shift    Acute kidney injury  Chronic LE edmea  Baseline creatinine appears to be in the 1.2 to 1.5 range. On admit at 2.0. Weight is ok (lower range for normal for him). Urinalysis as above is unremarkable. Suspect this is prerenal, creatinine only mildly elevated above baseline. Baseline weight is 160-164 lbs.  - hold diuretics, gentle IV hydration overnight  - avoid nephrotoxins including NSAIDs for back pain  - consider imaging, nephrology consult if fails to improve or worsens    Atrial fibrillation  Hypertension  NSR in the ED. Outpatient on dig 125 mcg M-F, dilitazem 180 mg daily, and bumex  - hold diuretics as is at \"dry weight\", no edema and KAYLI  - prn hydralazine sbp>180 available  - cont ASA  - continue digoxin, diltiazem    Parkinson disease  Dementia  Moderate cognitive impairment. ? If parkinson can explain pain  - continue sinemet    Glaucoma  Continue xalatan, cosopt    Constipation   Continue colace, prn laxatives    Pain plan: # Pain " Assessment:  Current Pain Score 1/26/2019   Patient currently in pain? -   Pain score (0-10) 5   Pain location -   Pain descriptors -   - Loco is experiencing pain due to back pain. Pain management was discussed with Loco and his family and the plan was created in a collaborative fashion.  Loco's response to the current recommendations: engaged  - Please see the plan for pain management as documented above    DVT Prophylaxis: Pneumatic Compression Devices  Code Status: DNR / DNI, has POLST form confirming this    Disposition: Expected discharge in 1-2 days pending diagnostics, improvement in his back pain    Stef Viveros MD  [phone] (P)  Text Page     Primary Care Physician   Dr. Wyatt Carbajal    Chief Complaint   Back pain    History is obtained from the patient and medical records.  Also to note his nephew is able to provide ancillary history.    History of Present Illness   Loco Hawkins is a 89 year old male who presents with back pain.  His medical history remarkable for atrial fibrillation, hypertension, hyperlipidemia, Parkinson disease, dementia and glaucoma.  Somewhat but did history given his dementia but it sounds as if he is at progressive back pain worsening over the last couple days.  He may have started a week or 2 ago best of my progress.  It sounds positional and if he finds the right position his back pain is improved.  Given the progression is active, emergency department.  Denies fevers chills.  Denies radiation of the pain.  He has no history of kidney stones.  Denies chest pain or shortness of breath.  Denies radiation of the pain into his stomach.    The emergency department he is afebrile with stable vital signs.  Exam is as below was remarkable for pain with minimal movement.  Labs are remarkable for creatinine of 2.0 with a baseline of 1.3-1.5.  CBC is remarkable for hemoglobin 10.1.  Urinalysis is bland.    Past Medical History    I have reviewed this patient's  medical history and updated it with pertinent information if needed.   Past Medical History:   Diagnosis Date     Dementia      Parkinson's disease (H)        Past Surgical History   I have reviewed this patient's surgical history and updated it with pertinent information if needed.  History reviewed. No pertinent surgical history.    Prior to Admission Medications   Prior to Admission Medications   Prescriptions Last Dose Informant Patient Reported? Taking?   Multiple Vitamins-Minerals (PRESERVISION AREDS 2+MULTI VIT PO) 1/25/2019 at 9pm  Yes Yes   Sig: Take 1 tablet by mouth At Bedtime 9pm   acetaminophen (TYLENOL) 500 MG tablet 1/26/2019 at 930am  Yes Yes   Sig: Take 500 mg by mouth 3 times daily 930am, 3pm and 9pm   aspirin 81 MG EC tablet 1/26/2019 at 930am custodial Yes Yes   Sig: Take 81 mg by mouth daily 930am   bumetanide (BUMEX) 0.5 MG tablet 1/26/2019 at 0930  Yes Yes   Sig: Take 0.5 mg by mouth daily Administer with 1 mg tablet for total of 1.5 mg daily. If weight is over 164 lbs then notify nurse before administering medication.   bumetanide (BUMEX) 1 MG tablet 1/26/2019 at 930am  Yes Yes   Sig: Take 1 mg by mouth daily Administer with the 0.5 mg for a total of 1.5 mg daily. If weight is over 164 lbs, notify nurse before administering medication.   bumetanide (BUMEX) 2 MG tablet Unknown at Unknown time  Yes No   Sig: Take 2 mg by mouth See Admin Instructions Take as directed if weight is over 164 lbs, notify nurse - one tablet for 3 days then resume 1.5 mg daily   calcitRIOL (ROCALTROL) 0.25 MCG capsule 1/25/2019 at 9pm  Yes Yes   Sig: Take 0.25 mcg by mouth At Bedtime 9pm   camphor-menthol (DERMASARRA) 0.5-0.5 % external lotion 1/26/2019 at 930am  Yes Yes   Sig: Apply topically 2 times daily Apply cream to back area twice daily 930am and 9pm   carbidopa-levodopa (SINEMET)  MG per tablet 1/26/2019 at 930am custodial Yes Yes   Sig: Take 2 tablets by mouth 3 times daily 930am, 3pm, 9pm    clobetasol (TEMOVATE) 0.05 % external cream 1/26/2019 at 930am  Yes Yes   Sig: Apply topically 2 times daily Apply to rash on arms and back twice daily 930am and 9pm   digoxin (LANOXIN) 125 MCG tablet 1/26/2019 at 930am long-term Yes Yes   Sig: Take 125 mcg by mouth five times a week Monday-Friday only, check pulse before giving, hold if pulse <50bpm. Notify MD if pulse is greater than 100 bpm   diltiazem (CARTIA XT) 180 MG 24 hr capsule 1/26/2019 at 930am long-term Yes Yes   Sig: Take 180 mg by mouth daily   docusate sodium (COLACE) 100 MG tablet 1/26/2019 at 930am long-term Yes Yes   Sig: Take 100 mg by mouth 2 times daily 930am and 9pm   dorzolamide-timolol (COSOPT) 2-0.5 % ophthalmic solution 1/26/2019 at 930am long-term Yes Yes   Sig: Place 1 drop into both eyes 2 times daily 930am and 9pm   latanoprost (XALATAN) 0.005 % ophthalmic solution 1/25/2019 at 9pm Nursing Home Yes Yes   Sig: Place 1 drop into both eyes At Bedtime Give 15 minutes before all other drops at 9pm   magnesium hydroxide (MILK OF MAGNESIA) 400 MG/5ML suspension 1/25/2019 at 9pm  Yes Yes   Sig: Take 30 mLs by mouth every other day At bedtime every other day at 9pm   multivitamin, therapeutic with minerals (MULTI-VITAMIN) TABS tablet 1/25/2019 at 9pm Nursing Home Yes Yes   Sig: Take 1 tablet by mouth daily [employer] at 9pm   omeprazole (PRILOSEC) 20 MG DR capsule 1/26/2019 at 920am  Yes Yes   Sig: Take 20 mg by mouth 2 times daily 920am and 9m   senna-docusate (SENOKOT-S;PERICOLACE) 8.6-50 MG per tablet 1/26/2019 at 930am  No Yes   Sig: Take 2 tablets by mouth 2 times daily as needed for constipation   triamcinolone acetonide 0.05 % OINT 1/26/2019 at 930am  No Yes   Sig: Externally apply topically 2 times daily   Patient taking differently: Externally apply topically 2 times daily Apply one applicator of ointment to both arms and back twice daily. Put on after the clobetasol cream for rash      Facility-Administered  Medications: None     Allergies   Allergies   Allergen Reactions     Penicillins Unknown       Social History   I have reviewed this patient's social history and updated it with pertinent information if needed. Loco Hawkins  has an unknown smoking status. he has never used smokeless tobacco. He reports that he does not drink alcohol or use drugs.    Family History   I have reviewed this patient's family history and updated it with pertinent information if needed.   Reviewed in the medical record and noncontributory    Review of Systems   The 10 point Review of Systems is negative other than noted in the HPI or here. Somewhat limited 2/2 patient's mental status    Physical Exam   Temp: 97.6  F (36.4  C) Temp src: Oral BP: 164/63 Pulse: (!) 49   Resp: 16 SpO2: 99 %      Vital Signs with Ranges  160 lbs 0 oz    Constitutional: alert, oriented, mild distress 2/2 his back pain  Eyes: EOMI, PERRL  HEENT: OP clear  Respiratory: CTA B without w/c  Cardiovascular: RRR. No peripheral edema noted  GI: soft, nontender, nondistended, no HSM  Lymph/Hematologic: no cervical LAD  Genitourinary: deferred  Skin: no rashes or lesions grossly  Musculoskeletal: pain with limited movement in the bed, any slight movement causes marked pain. No pain to palpation along his back. Passive ROM with his bilateral hips does not exacerbate the pain. When moving, he points to the area of lower back, paraspinal, as to the location of the pain  Neurologic: CN II-XII, DING, sensation grossly intact  Psychiatric: mood and affect wnl    Data   Data reviewed today:  I personally reviewed the lumbar CT image(s) showing no evidence of fracture.  Recent Labs   Lab 01/26/19  1450   WBC 8.3   HGB 10.1*   MCV 92         POTASSIUM 4.2   CHLORIDE 108   CO2 27   BUN 59*   CR 2.03*   ANIONGAP 7   DON 8.9   *       Recent Results (from the past 24 hour(s))   CT Abdomen Pelvis w/o Contrast    Narrative    CT ABDOMEN AND PELVIS WITHOUT  CONTRAST   1/26/2019 3:27 PM     HISTORY: Bilateral low back pain and urinary urgency. History of  prostate cancer.    TECHNIQUE: No IV contrast material. Radiation dose for this scan was  reduced using automated exposure control, adjustment of the mA and/or  kV according to patient size, or iterative reconstruction technique.    COMPARISON: None.    FINDINGS: There are a few tiny pulmonary nodules in the lung bases.  Mild fibrosis or atelectasis at the left lung base. Coronary arterial  calcification is present. There is a small hiatal hernia. Calcified  stone in the gallbladder. Within limits of noncontrast technique, no  acute abnormality is seen in the liver, spleen, pancreas, adrenal  glands, or kidneys. There is no hydronephrosis. No renal, ureteral, or  bladder calculi are seen. Moderate atherosclerotic changes in the  aorta. No evidence of aneurysm. There is a right inguinal hernia  containing small bowel. The bowel is normal in caliber without  evidence of obstruction. Appendix is not seen. Surgical clips in the  pelvis. No ascites, fluid collections, or free intraperitoneal air.  There are mildly prominent inguinal lymph nodes bilaterally. There is  multilevel degenerative change in the spine.      Impression    IMPRESSION:   1. No acute abnormality identified.  2. A few tiny pulmonary nodules, none measuring more than 6 mm.  3. Small hiatal hernia.  4. Cholelithiasis.  5. Right inguinal hernia containing small bowel. No evidence of bowel  obstruction.    Recommendations for an incidental lung nodule < 6mm :    Low risk patients: No routine follow-up.    High risk patients: Optional follow-up CT at 12 months; if  unchanged, no further follow-up.    *Low Risk: Minimal or absent history of smoking or other known risk  factors.  *Nonsolid (ground glass) or partly solid nodules may require longer  follow-up to exclude indolent adenocarcinoma.  *Recommendations based on Guidelines for the Management of  Incidental  Pulmonary Nodules Detected at CT: From the Fleischner Society 2017,  Radiology 2017.    TAE GOMEZ MD   Lumbar spine CT w/o contrast    Narrative    CT LUMBAR SPINE WITHOUT CONTRAST 1/26/2019 3:27 PM     HISTORY: Back pain, risk factors (osteoporosis or chronic steroid use  or elderly).    TECHNIQUE: Axial images were obtained through the lumbar spine without  contrast. Coronal and sagittal reconstructions were also acquired.  Radiation dose for this scan was reduced using automated exposure  control, adjustment of the mA and/or kV according to patient size, or  iterative reconstruction technique.    COMPARISON: 12/20/2017.    FINDINGS: Sagittal reconstructions demonstrate normal posterior  alignment. There is no evidence for fracture. There are bridging  syndesmophytes uniting the L3-L4 and L4-L5 vertebral bodies. Disc  spaces are relatively preserved in height. Vascular calcifications  noted.    T12-L1: Normal.    L1-2: Minimal disc bulging asymmetric to the right. No stenosis.    L3-4: Minimal disc bulge and facet hypertrophy. No stenosis.    L4-5: Minimal disc bulge and mild to moderate facet hypertrophy is  present causing some mild central and mild bilateral neural foraminal  stenosis.    L5-S1: Mild to moderate facet hypertrophy is present along with some  broad-based disc bulging asymmetric to the left. There is secondary  mild to moderate bilateral neural foraminal stenosis and mild central  canal stenosis.      Impression    IMPRESSION:  1. No evidence for fracture or any posterior malalignment.  2. Mild degenerative disc and facet disease as described.    ALISSA VILLATORO MD

## 2019-01-28 ENCOUNTER — APPOINTMENT (OUTPATIENT)
Dept: CARDIOLOGY | Facility: CLINIC | Age: 84
End: 2019-01-28
Payer: MEDICARE

## 2019-01-28 LAB
ANION GAP SERPL CALCULATED.3IONS-SCNC: 6 MMOL/L (ref 3–14)
BASOPHILS # BLD AUTO: 0 10E9/L (ref 0–0.2)
BASOPHILS NFR BLD AUTO: 0.4 %
BUN SERPL-MCNC: 36 MG/DL (ref 7–30)
CALCIUM SERPL-MCNC: 8.4 MG/DL (ref 8.5–10.1)
CHLORIDE SERPL-SCNC: 113 MMOL/L (ref 94–109)
CO2 SERPL-SCNC: 23 MMOL/L (ref 20–32)
CREAT SERPL-MCNC: 1.51 MG/DL (ref 0.66–1.25)
DIFFERENTIAL METHOD BLD: ABNORMAL
EOSINOPHIL # BLD AUTO: 0.2 10E9/L (ref 0–0.7)
EOSINOPHIL NFR BLD AUTO: 2.8 %
ERYTHROCYTE [DISTWIDTH] IN BLOOD BY AUTOMATED COUNT: 16.2 % (ref 10–15)
GFR SERPL CREATININE-BSD FRML MDRD: 38 ML/MIN/{1.73_M2}
GLUCOSE SERPL-MCNC: 93 MG/DL (ref 70–99)
HCT VFR BLD AUTO: 32.2 % (ref 40–53)
HGB BLD-MCNC: 10.2 G/DL (ref 13.3–17.7)
IMM GRANULOCYTES # BLD: 0 10E9/L (ref 0–0.4)
IMM GRANULOCYTES NFR BLD: 0.2 %
LYMPHOCYTES # BLD AUTO: 1.4 10E9/L (ref 0.8–5.3)
LYMPHOCYTES NFR BLD AUTO: 16.2 %
MCH RBC QN AUTO: 28.8 PG (ref 26.5–33)
MCHC RBC AUTO-ENTMCNC: 31.7 G/DL (ref 31.5–36.5)
MCV RBC AUTO: 91 FL (ref 78–100)
MONOCYTES # BLD AUTO: 0.6 10E9/L (ref 0–1.3)
MONOCYTES NFR BLD AUTO: 7.5 %
NEUTROPHILS # BLD AUTO: 6.2 10E9/L (ref 1.6–8.3)
NEUTROPHILS NFR BLD AUTO: 72.9 %
NRBC # BLD AUTO: 0 10*3/UL
NRBC BLD AUTO-RTO: 0 /100
PLATELET # BLD AUTO: 197 10E9/L (ref 150–450)
POTASSIUM SERPL-SCNC: 3.9 MMOL/L (ref 3.4–5.3)
RBC # BLD AUTO: 3.54 10E12/L (ref 4.4–5.9)
SODIUM SERPL-SCNC: 142 MMOL/L (ref 133–144)
WBC # BLD AUTO: 8.4 10E9/L (ref 4–11)

## 2019-01-28 PROCEDURE — 99225 ZZC SUBSEQUENT OBSERVATION CARE,LEVEL II: CPT | Performed by: PHYSICIAN ASSISTANT

## 2019-01-28 PROCEDURE — A9270 NON-COVERED ITEM OR SERVICE: HCPCS | Mod: GY | Performed by: PHYSICIAN ASSISTANT

## 2019-01-28 PROCEDURE — 25000128 H RX IP 250 OP 636: Performed by: INTERNAL MEDICINE

## 2019-01-28 PROCEDURE — 85025 COMPLETE CBC W/AUTO DIFF WBC: CPT | Performed by: PHYSICIAN ASSISTANT

## 2019-01-28 PROCEDURE — 80048 BASIC METABOLIC PNL TOTAL CA: CPT | Performed by: PHYSICIAN ASSISTANT

## 2019-01-28 PROCEDURE — 25000132 ZZH RX MED GY IP 250 OP 250 PS 637: Mod: GY | Performed by: EMERGENCY MEDICINE

## 2019-01-28 PROCEDURE — 25500064 ZZH RX 255 OP 636: Performed by: INTERNAL MEDICINE

## 2019-01-28 PROCEDURE — 25000132 ZZH RX MED GY IP 250 OP 250 PS 637: Mod: GY | Performed by: PHYSICIAN ASSISTANT

## 2019-01-28 PROCEDURE — 25000132 ZZH RX MED GY IP 250 OP 250 PS 637: Mod: GY | Performed by: INTERNAL MEDICINE

## 2019-01-28 PROCEDURE — 96374 THER/PROPH/DIAG INJ IV PUSH: CPT | Mod: 59

## 2019-01-28 PROCEDURE — 96375 TX/PRO/DX INJ NEW DRUG ADDON: CPT

## 2019-01-28 PROCEDURE — G0378 HOSPITAL OBSERVATION PER HR: HCPCS

## 2019-01-28 PROCEDURE — 36415 COLL VENOUS BLD VENIPUNCTURE: CPT | Performed by: PHYSICIAN ASSISTANT

## 2019-01-28 PROCEDURE — 40000264 ECHOCARDIOGRAM COMPLETE

## 2019-01-28 PROCEDURE — 99214 OFFICE O/P EST MOD 30 MIN: CPT | Mod: 25 | Performed by: INTERNAL MEDICINE

## 2019-01-28 PROCEDURE — 93306 TTE W/DOPPLER COMPLETE: CPT | Mod: 26 | Performed by: INTERNAL MEDICINE

## 2019-01-28 PROCEDURE — A9270 NON-COVERED ITEM OR SERVICE: HCPCS | Mod: GY | Performed by: INTERNAL MEDICINE

## 2019-01-28 RX ORDER — DILTIAZEM HYDROCHLORIDE 120 MG/1
120 CAPSULE, EXTENDED RELEASE ORAL DAILY
Status: DISCONTINUED | OUTPATIENT
Start: 2019-01-28 | End: 2019-01-29 | Stop reason: HOSPADM

## 2019-01-28 RX ADMIN — DORZOLAMIDE HYDROCHLORIDE AND TIMOLOL MALEATE 1 DROP: 20; 5 SOLUTION/ DROPS OPHTHALMIC at 21:38

## 2019-01-28 RX ADMIN — ASPIRIN 81 MG: 81 TABLET, COATED ORAL at 09:52

## 2019-01-28 RX ADMIN — DOCUSATE SODIUM 100 MG: 100 CAPSULE, LIQUID FILLED ORAL at 09:53

## 2019-01-28 RX ADMIN — HYDRALAZINE HYDROCHLORIDE 10 MG: 20 INJECTION INTRAMUSCULAR; INTRAVENOUS at 09:57

## 2019-01-28 RX ADMIN — HUMAN ALBUMIN MICROSPHERES AND PERFLUTREN 9 ML: 10; .22 INJECTION, SOLUTION INTRAVENOUS at 10:39

## 2019-01-28 RX ADMIN — LATANOPROST 1 DROP: 50 SOLUTION OPHTHALMIC at 22:56

## 2019-01-28 RX ADMIN — CARBIDOPA AND LEVODOPA 2 TABLET: 25; 100 TABLET ORAL at 20:43

## 2019-01-28 RX ADMIN — DILTIAZEM HYDROCHLORIDE 120 MG: 120 CAPSULE, EXTENDED RELEASE ORAL at 11:25

## 2019-01-28 RX ADMIN — TRAMADOL HYDROCHLORIDE 50 MG: 50 TABLET, COATED ORAL at 11:25

## 2019-01-28 RX ADMIN — LIDOCAINE 1 PATCH: 560 PATCH PERCUTANEOUS; TOPICAL; TRANSDERMAL at 17:58

## 2019-01-28 RX ADMIN — DOCUSATE SODIUM 100 MG: 100 CAPSULE, LIQUID FILLED ORAL at 20:43

## 2019-01-28 RX ADMIN — ACETAMINOPHEN 1000 MG: 500 TABLET, FILM COATED ORAL at 14:04

## 2019-01-28 RX ADMIN — ACETAMINOPHEN 1000 MG: 500 TABLET, FILM COATED ORAL at 17:56

## 2019-01-28 RX ADMIN — OMEPRAZOLE 20 MG: 20 CAPSULE, DELAYED RELEASE ORAL at 09:52

## 2019-01-28 RX ADMIN — CARBIDOPA AND LEVODOPA 2 TABLET: 25; 100 TABLET ORAL at 09:53

## 2019-01-28 RX ADMIN — OMEPRAZOLE 20 MG: 20 CAPSULE, DELAYED RELEASE ORAL at 20:43

## 2019-01-28 RX ADMIN — ACETAMINOPHEN 1000 MG: 500 TABLET, FILM COATED ORAL at 09:52

## 2019-01-28 RX ADMIN — CARBIDOPA AND LEVODOPA 2 TABLET: 25; 100 TABLET ORAL at 14:04

## 2019-01-28 ASSESSMENT — MIFFLIN-ST. JEOR: SCORE: 1310.29

## 2019-01-28 NOTE — CONSULTS
[facility]    Cardiology Consultation     Date of Admission:  1/26/2019    Assessment & Plan   Loco Hawkins is a 89 year old male who was admitted on 1/26/2019.    1. Asymptomatic sinus bradycardia  2. History of paroxysmal SVT  3. Deconditioning and frailty  4. Hypothyroidism  5. Uncontrolled hypertension  6. Anemia  7. CKD    Patient has asymptomatic sinus bradycardia on his current regimen of digoxin and diltiazem. No high grade pauses on tele noted. This is not unexpected given his age.  At this time despite a normal digoxin level, I recommend stopping digoxin given his age.  I recommend backing off his diltiazem 120 mg once daily.  Recommend sending him home on a 7-14-day monitor and outpatient cardiology follow-up with his cardiologist in [facility].  We will get an echocardiogram given his murmur although I do not think he has severe aortic stenosis.  He may need better hypertension control but I am not sure how much of this high blood pressure is from pain.  We will make final recommendations after his echo result.  Would recommend watching him another day in the hospital on 120 mg once a day of diltiazem.     Hypothyroidism certainly contributing.  Primary team to address that.    Corwin Saavedra MD    Primary Care Physician   Wyatt Carbajal    Reason for Consult   Reason for consult: I was asked by medicine team to evaluate this patient for sinus bradycardia.    History of Present Illness   Loco Hawkins is a 89 year old male who presents with back pain.  The patient does not have his cardiology care at [facility].  He follows up at [facility].  He was seen last and a 2017.  He has a history of paroxysmal SVT that has been maintained on 5 times weekly digoxin and 180 mg of diltiazem daily.  Denies any prior major cardiac problems other than that.  His last echocardiogram was in 2004.  Patient is a very poor historian.  He lives in an assisted living facility in [facility] and is in  wheelchair most of the times.  During his admission on telemetry he was noted to have asymptomatic heart rates in the 30s-40s when sleeping and 40s when he is awake sometimes.  His diltiazem and digoxin have been discontinued.  Also noted to have hypothyroidism.  Denies chest pain shortness of breath palpitations syncope presyncope.    Patient Active Problem List   Diagnosis     iamSPRAIN ROTATOR CUFF     Influenza A     Influenza     Back pain       Past Medical History   I have reviewed this patient's medical history and updated it with pertinent information if needed.   Past Medical History:   Diagnosis Date     Dementia      Parkinson's disease (H)        Past Surgical History   I have reviewed this patient's surgical history and updated it with pertinent information if needed.  History reviewed. No pertinent surgical history.    Prior to Admission Medications   Prior to Admission Medications   Prescriptions Last Dose Informant Patient Reported? Taking?   Multiple Vitamins-Minerals (PRESERVISION AREDS 2+MULTI VIT PO) 1/25/2019 at 9pm  Yes Yes   Sig: Take 1 tablet by mouth At Bedtime 9pm   acetaminophen (TYLENOL) 500 MG tablet 1/26/2019 at 930am  Yes Yes   Sig: Take 500 mg by mouth 3 times daily 930am, 3pm and 9pm   aspirin 81 MG EC tablet 1/26/2019 at 930am halfway Yes Yes   Sig: Take 81 mg by mouth daily 930am   bumetanide (BUMEX) 0.5 MG tablet 1/26/2019 at 0930  Yes Yes   Sig: Take 0.5 mg by mouth daily Administer with 1 mg tablet for total of 1.5 mg daily. If weight is over 164 lbs then notify nurse before administering medication.   bumetanide (BUMEX) 1 MG tablet 1/26/2019 at 930am  Yes Yes   Sig: Take 1 mg by mouth daily Administer with the 0.5 mg for a total of 1.5 mg daily. If weight is over 164 lbs, notify nurse before administering medication.   bumetanide (BUMEX) 2 MG tablet Unknown at Unknown time  Yes No   Sig: Take 2 mg by mouth See Admin Instructions Take as directed if weight is over 164 lbs,  notify nurse - one tablet for 3 days then resume 1.5 mg daily   calcitRIOL (ROCALTROL) 0.25 MCG capsule 1/25/2019 at 9pm  Yes Yes   Sig: Take 0.25 mcg by mouth At Bedtime 9pm   camphor-menthol (DERMASARRA) 0.5-0.5 % external lotion 1/26/2019 at 930am  Yes Yes   Sig: Apply topically 2 times daily Apply cream to back area twice daily 930am and 9pm   carbidopa-levodopa (SINEMET)  MG per tablet 1/26/2019 at 930am [facility] Yes Yes   Sig: Take 2 tablets by mouth 3 times daily 930am, 3pm, 9pm   clobetasol (TEMOVATE) 0.05 % external cream 1/26/2019 at 930am  Yes Yes   Sig: Apply topically 2 times daily Apply to rash on arms and back twice daily 930am and 9pm   digoxin (LANOXIN) 125 MCG tablet 1/26/2019 at 930am [facility] Yes Yes   Sig: Take 125 mcg by mouth five times a week Monday-Friday only, check pulse before giving, hold if pulse <50bpm. Notify MD if pulse is greater than 100 bpm   diltiazem (CARTIA XT) 180 MG 24 hr capsule 1/26/2019 at 930am [facility] Yes Yes   Sig: Take 180 mg by mouth daily   docusate sodium (COLACE) 100 MG tablet 1/26/2019 at 930am [facility] Yes Yes   Sig: Take 100 mg by mouth 2 times daily 930am and 9pm   dorzolamide-timolol (COSOPT) 2-0.5 % ophthalmic solution 1/26/2019 at [facility] Yes Yes   Sig: Place 1 drop into both eyes 2 times daily 930am and 9pm   latanoprost (XALATAN) 0.005 % ophthalmic solution 1/25/2019 at 9pm Nursing Home Yes Yes   Sig: Place 1 drop into both eyes At Bedtime Give 15 minutes before all other drops at 9pm   magnesium hydroxide (MILK OF MAGNESIA) 400 MG/5ML suspension 1/25/2019 at 9pm  Yes Yes   Sig: Take 30 mLs by mouth every other day At bedtime every other day at 9pm   multivitamin, therapeutic with minerals (MULTI-VITAMIN) TABS tablet 1/25/2019 at 9pm Nursing Home Yes Yes   Sig: Take 1 tablet by mouth daily [employer] at 9pm   omeprazole (PRILOSEC) 20 MG DR capsule 1/26/2019 at 920am  Yes Yes   Sig: Take 20 mg by mouth 2 times daily  920am and 9m   senna-docusate (SENOKOT-S;PERICOLACE) 8.6-50 MG per tablet 1/26/2019 at 930am  No Yes   Sig: Take 2 tablets by mouth 2 times daily as needed for constipation   triamcinolone acetonide 0.05 % OINT 1/26/2019 at 930am  No Yes   Sig: Externally apply topically 2 times daily   Patient taking differently: Externally apply topically 2 times daily Apply one applicator of ointment to both arms and back twice daily. Put on after the clobetasol cream for rash      Facility-Administered Medications: None     Current Facility-Administered Medications   Medication Dose Route Frequency     acetaminophen  1,000 mg Oral TID     aspirin  81 mg Oral Daily     carbidopa-levodopa  2 tablet Oral TID     docusate sodium  100 mg Oral BID     dorzolamide-timolol PF  1 drop Both Eyes BID     latanoprost  1 drop Both Eyes At Bedtime     lidocaine  1 patch Transdermal Q24H    And     lidocaine   Transdermal Q24H    And     lidocaine   Transdermal Q8H     omeprazole  20 mg Oral BID     Current Facility-Administered Medications   Medication Last Rate     Allergies   Allergies   Allergen Reactions     Penicillins Unknown       Social History    has an unknown smoking status. he has never used smokeless tobacco. He reports that he does not drink alcohol or use drugs.    Family History   No family history on file.    Review of Systems   The comprehensive 10 point Review of Systems is negative other than noted in the HPI or here.     Physical Exam   Vital Signs with Ranges  Temp:  [97.5  F (36.4  C)-97.9  F (36.6  C)] 97.5  F (36.4  C)  Pulse:  [46-56] 56  Resp:  [16-18] 18  BP: (134-188)/(46-68) 188/68  SpO2:  [93 %-97 %] 95 %  Wt Readings from Last 4 Encounters:   01/28/19 71.1 kg (156 lb 11.2 oz)   02/05/18 63.5 kg (140 lb)   01/26/18 59.9 kg (132 lb)   12/17/17 61.2 kg (135 lb)     I/O last 3 completed shifts:  In: 1360 [P.O.:360; I.V.:1000]  Out: 350 [Urine:350]      Vitals: /68 (BP Location: Left arm)   Pulse 56   Temp  "97.5  F (36.4  C) (Oral)   Resp 18   Ht 1.727 m (5' 8\")   Wt 71.1 kg (156 lb 11.2 oz)   SpO2 95%   BMI 23.83 kg/m      General alert oriented x2 in no acute distress, but complaining of severe back and extremity pains.  Neck restricted motion but JVP seems normal  Lungs are essentially clear to auscultation bilaterally  Cardiac normal S1 and quite crisp S2.  Ejection systolic murmur.  No S3-S4.  Sinus bradycardia.  Abdomen mild diffuse tenderness without rebound or guarding  Extremities warm no edema    No lab results found in last 7 days.    Invalid input(s): TROPONINIES    Recent Labs   Lab 01/28/19  0709 01/27/19  0633 01/26/19  1450   WBC 8.4 8.8 8.3   HGB 10.2* 9.7* 10.1*   MCV 91 91 92    214 210    141 142   POTASSIUM 3.9 3.9 4.2   CHLORIDE 113* 110* 108   CO2 23 26 27   BUN 36* 48* 59*   CR 1.51* 1.80* 2.03*   GFRESTIMATED 38* 31* 27*   GFRESTBLACK 44* 36* 31*   ANIONGAP 6 5 7   DON 8.4* 8.0* 8.9   GLC 93 96 104*   ALBUMIN  --  2.7*  --    PROTTOTAL  --  6.8  --    BILITOTAL  --  0.4  --    ALKPHOS  --  41  --    ALT  --  7  --    AST  --  22  --    LIPASE  --  78  --      No results for input(s): CHOL, HDL, LDL, TRIG, CHOLHDLRATIO in the last 55385 hours.  Recent Labs   Lab 01/28/19  0709 01/27/19  0633 01/26/19  1450   WBC 8.4 8.8 8.3   HGB 10.2* 9.7* 10.1*   HCT 32.2* 31.2* 32.2*   MCV 91 91 92    214 210     No results for input(s): PH, PHV, PO2, PO2V, SAT, PCO2, PCO2V, HCO3, HCO3V in the last 168 hours.  No results for input(s): NTBNPI, NTBNP in the last 168 hours.  No results for input(s): DD in the last 168 hours.  Recent Labs   Lab 01/27/19  0633   SED 32*   CRP 25.1*     Recent Labs   Lab 01/28/19  0709 01/27/19  0633 01/26/19  1450    214 210     Recent Labs   Lab 01/27/19  0633   TSH 9.14*     Recent Labs   Lab 01/26/19  1425   COLOR Light Yellow   APPEARANCE Clear   URINEGLC Negative   URINEBILI Negative   URINEKETONE Negative   SG 1.008   UBLD Negative   URINEPH " 5.0   PROTEIN Negative   NITRITE Negative   LEUKEST Negative       Imaging:  Recent Results (from the past 48 hour(s))   CT Abdomen Pelvis w/o Contrast    Narrative    CT ABDOMEN AND PELVIS WITHOUT CONTRAST   1/26/2019 3:27 PM     HISTORY: Bilateral low back pain and urinary urgency. History of  prostate cancer.    TECHNIQUE: No IV contrast material. Radiation dose for this scan was  reduced using automated exposure control, adjustment of the mA and/or  kV according to patient size, or iterative reconstruction technique.    COMPARISON: None.    FINDINGS: There are a few tiny pulmonary nodules in the lung bases.  Mild fibrosis or atelectasis at the left lung base. Coronary arterial  calcification is present. There is a small hiatal hernia. Calcified  stone in the gallbladder. Within limits of noncontrast technique, no  acute abnormality is seen in the liver, spleen, pancreas, adrenal  glands, or kidneys. There is no hydronephrosis. No renal, ureteral, or  bladder calculi are seen. Moderate atherosclerotic changes in the  aorta. No evidence of aneurysm. There is a right inguinal hernia  containing small bowel. The bowel is normal in caliber without  evidence of obstruction. Appendix is not seen. Surgical clips in the  pelvis. No ascites, fluid collections, or free intraperitoneal air.  There are mildly prominent inguinal lymph nodes bilaterally. There is  multilevel degenerative change in the spine.      Impression    IMPRESSION:   1. No acute abnormality identified.  2. A few tiny pulmonary nodules, none measuring more than 6 mm.  3. Small hiatal hernia.  4. Cholelithiasis.  5. Right inguinal hernia containing small bowel. No evidence of bowel  obstruction.    Recommendations for an incidental lung nodule < 6mm :    Low risk patients: No routine follow-up.    High risk patients: Optional follow-up CT at 12 months; if  unchanged, no further follow-up.    *Low Risk: Minimal or absent history of smoking or other known  risk  factors.  *Nonsolid (ground glass) or partly solid nodules may require longer  follow-up to exclude indolent adenocarcinoma.  *Recommendations based on Guidelines for the Management of Incidental  Pulmonary Nodules Detected at CT: From the Fleischner Society 2017,  Radiology 2017.    TAE GOMEZ MD   Lumbar spine CT w/o contrast    Narrative    CT LUMBAR SPINE WITHOUT CONTRAST 1/26/2019 3:27 PM     HISTORY: Back pain, risk factors (osteoporosis or chronic steroid use  or elderly).    TECHNIQUE: Axial images were obtained through the lumbar spine without  contrast. Coronal and sagittal reconstructions were also acquired.  Radiation dose for this scan was reduced using automated exposure  control, adjustment of the mA and/or kV according to patient size, or  iterative reconstruction technique.    COMPARISON: 12/20/2017.    FINDINGS: Sagittal reconstructions demonstrate normal posterior  alignment. There is no evidence for fracture. There are bridging  syndesmophytes uniting the L3-L4 and L4-L5 vertebral bodies. Disc  spaces are relatively preserved in height. Vascular calcifications  noted.    T12-L1: Normal.    L1-2: Minimal disc bulging asymmetric to the right. No stenosis.    L3-4: Minimal disc bulge and facet hypertrophy. No stenosis.    L4-5: Minimal disc bulge and mild to moderate facet hypertrophy is  present causing some mild central and mild bilateral neural foraminal  stenosis.    L5-S1: Mild to moderate facet hypertrophy is present along with some  broad-based disc bulging asymmetric to the left. There is secondary  mild to moderate bilateral neural foraminal stenosis and mild central  canal stenosis.      Impression    IMPRESSION:  1. No evidence for fracture or any posterior malalignment.  2. Mild degenerative disc and facet disease as described.    ALISSA VILLATORO MD   MR Lumbar Spine w/o Contrast    Narrative    MR LUMBAR SPINE WITHOUT CONTRAST 1/27/2019 1:17 AM     HISTORY: Back pain, less than  six weeks, no red flags, no prior  management.    TECHNIQUE: Multiplanar multisequence images were obtained through the  lumbar spine without contrast.    COMPARISON: 12/20/2017.    FINDINGS: Sagittal images demonstrate normal posterior alignment. Disc  spaces are relatively preserved in height. Bone marrow signal  intensity is within normal limits. The conus medullaris and cauda  equina nerve roots appear normal.    L1-L2: Minimal disc bulge. No stenosis.    L2-L3: Minimal disc bulge. No stenosis.    L3-L4: Minimal disc bulge. No stenosis.    L4-L5: Minimal disc bulging and mild facet hypertrophy is present.  There is some mild right-sided foraminal stenosis but no central canal  stenosis.    L5-SI: Minimal facet hypertrophy and disc bulging. No stenosis.      Impression    IMPRESSION: Minimal degenerative changes. No evidence for any  significant stenosis or any focal disc herniations.    ALISSA VILLATORO MD       Echo:  No results found for this or any previous visit (from the past 4320 hour(s)).

## 2019-01-28 NOTE — PLAN OF CARE
PRIMARY DIAGNOSIS: ACUTE PAIN  OUTPATIENT/OBSERVATION GOALS TO BE MET BEFORE DISCHARGE:  1. Pain Status: Improved-controlled with oral pain medications.     2. Return to near baseline physical activity: Yes     3. Cleared for discharge by consultants (if involved): No     Discharge Planner Nurse   Safe discharge environment identified: Yes  Barriers to discharge: No    Alert and oriented x4, but forgetful. Up with one with therapy. Bradycardic down to 33 mostly in 40's. Tele Sinus Lexx. Other VSS. Pain controled with tylenol and lidocaine patch. Incontinent of bladder. IVF running until 2030. Plan to continue to monitor eventually discharge back to [facility].

## 2019-01-28 NOTE — PLAN OF CARE
PRIMARY DIAGNOSIS: ACUTE PAIN  OUTPATIENT/OBSERVATION GOALS TO BE MET BEFORE DISCHARGE:  1. Pain Status: Improved-controlled with oral pain medications.     2. Return to near baseline physical activity: Yes     3. Cleared for discharge by consultants (if involved): No     Discharge Planner Nurse   Safe discharge environment identified: Yes  Barriers to discharge: No     Alert and oriented to self and situation. VSS. Tele: Sinus rina. Tylenol and heat packs for lower back pain. IV SL. Tolerating diet. Incontinent, bladder scanned. Repositioned as needed. PT, OT, and SW following. Continue to monitor.

## 2019-01-28 NOTE — PLAN OF CARE
PRIMARY DIAGNOSIS: ACUTE PAIN  OUTPATIENT/OBSERVATION GOALS TO BE MET BEFORE DISCHARGE:  1. Pain Status: Yes-By oral meds     2. Return to near baseline physical activity: Yes     3. Cleared for discharge by consultants (if involved): No     Discharge Planner Nurse   Safe discharge environment identified: Yes  Barriers to discharge: No

## 2019-01-28 NOTE — CONSULTS
Care Transition Initial Assessment - SW     Met with: Patient and spoke to facility staff Henrry via telephone    Active Problems:    Back pain       DATA  Lives With: facility resident at [facility]  Living Arrangements: assisted living  Quality of Family Relationships: helpful, involved, supportive  Description of Support System: Supportive, Involved  Who is your support system?: Facility resident(s)/Staff, Other (specify)  Support Assessment: Adequate family and caregiver support, Adequate social supports.   ASSESSMENT  Cognitive Status:  awake, alert and oriented  Concerns to be addressed: Discharge needs pending.  SW has reviewed pt records and discussed during hospital rounds with IDT. Pt is Loco, a 89yo male who was admitted under observation on 1/26/19 with diagnosis of back pain. Pt typically lives in [facility] where he receives assistance with; transfers, mobility,toileting, showering, grooming, medications. Patient at baseline does not ambulate as he is wheelchair bound.     PT evaluated pt yesterday and the current recommendation is to return to skilled nursing with assist of 1-2 for all mobility and transfers and Home PT. If facility unable to provide assistance required, PT recommends TCU for strengthening and independence with transfers. SW discussed with PA and plan will be to discharge tomorrow once pain better controled.     SW met with pt to introduce self/role, perform assessment, and discuss discharge planning. Pt agreeable to returning to skilled nursing. SW to follow and assist with potential discharge plan back to skilled nursing tomorrow.    PLAN  Financial costs for the patient includes:  TBD .  Patient given options and choices for discharge : Anticipate discharging back to [facility].  Patient/family is agreeable to the plan?  Yes  Patient Goals and Preferences: [facility] .  Patient anticipates discharging to:  [facility] .

## 2019-01-28 NOTE — PROGRESS NOTES
[facility]    Hospitalist Progress Note    Assessment & Plan   Loco Hawkins is a 89 year old male who was admitted on 1/26/2019.     PMHx of paroxsymal SVT, hypertension, hyperlipidemia, Parkinson's disease, dementia, and glaucoma who presented 1/26/19 with back pain, progressively worsening over the past couple of days. Pt bradycardic in the 40s, remainder of vitals WNL. Pt currently stable.      Back pain, improved:   Localized to mid-lumbar spine, no radiation, no tenderness to palpation.  No numbness, tingling, saddle anesthesia, increased weakness from baseline (WC bound). MR lumbar, CT lumbar without acute pathology. CT A/P without acute pathology; comments on a few tiny pulmonary nodules, small hiatal hernia, cholelithiasis, right inguinal hernia containing small bowel. KAYLI noted. CRP 25.1. ESR 32. Pt recently working with PT and exercise program at facility, likely strain related to that as no acute pathology identified with above work-up  --Pain control with tylenol 1000 mg po TID, Tramadol 50 mg po q6 hrs PRN, lidoderm patch; ice/heat. Judicious use of narcotics given patient's age and increased risk for delirium.   --Bowel regimen in place while on narcotics.    --PT consulted, recommend return back to [facility] with A X1-2 for all mobility and transfers, home health cares to be ordered (pt is WC bound at baseline).  --Social work for discharge planning.     Sinus bradycardia:   HR in the 40s since admission. Digoxin level 1.3 (WNL). EKG with sinus bradycardia. Pt dipped as low as 33 bpm, consistently in the 40s otherwise. On chart review, during prior hospitalizations has been in 50s-60s. Asymptomatic at rest, difficult historian, nephew noted pt had mild dizziness when working with PT earlier today. TSH elevated at 9.14 with Free T4 WNL 0.87.    Maintained on diltiazem 180 mg po every day and digoxin 125 mcg po M-F, holding both medications at this time, last dose of digoxin 1/25, last  dose of diltiazem 1/26 at 0930.   --Monitor on telemetry.   --Appreciate Cardiology consult:  discharge digoxin, start diltiazem 120 mg daily, Echo later and monitor over night.  Discharge with 7-14 day Holter monitor.       Pulmonary nodules:   Noted on CT.   ---No further work-up given patient's age      Normocytic anemia:   Baseline Hgb 10-11. No active signs of bleeding.   --Monitor.     Overactive bladder:   Has feelings that he has to go frequently. Voiding small amounts. Bladder scan in the ED with minimal urine. Urinalysis is bland (checked here and at care facility). Hx of prostate cancer s/p prostatectomy   --Bladder scan every shift.   --Discussed options with nephew, likely related to Parkison's disease. Could consider medication for overactive bladder, including detrol or oxybutynin, however hesitant to prescribe anticholinergics to pt given age and increased risk of adverse effects. Will likely need further discussion with PCP as pt's picture confounded as he is receiving IVF for KAYLI (below).     Acute kidney injury on CKD III  Chronic LE edema:   Baseline creatinine appears to be in the 1.2 to 1.5 range. Creatinine 2.03 on admission. Weight is ok (lower range for normal for him); baseline weight is 160-164 lbs. Urinalysis as above is unremarkable. Last echocardiogram from 2004 with preserved EF, no significant valvular disease. No significant edema appreciated on my exam.   --PTA Bumex 1.5 mg po every day held on admission; takes 2 mg X3 days for weights >164 pounds. Probable plan will be to discharge patient on Bumex 0.5 mg po every day, monitor weights and renal fxn closely with repeat BMP later this week and close PCP follow-up (has appointment on 1/29).  --S/P 1 L IVF bolus, hydrated with IVF overnight; continue IVF at 100 ccs/hr, discontinue this evening.    --Strict I&Os, daily weights.   --Avoid nephrotoxins including NSAIDs for back pain.  --BMP in the AM.      Paroxsymal SVT  Hypertension:    Follows with [facility] (see Todd, last seen 10/2017). Outpatient on dig 125 mcg M-F, dilitazem 180 mg daily, and bumex.  --Holding diltiazem, digoxin, and bumex as above; see bradycardia and KAYLI plans above.   --PRN hydralazine sbp>180 available.  --cont ASA 81 mg po every day.     Parkinson disease  Dementia  Right metatarsalgia  RLE weakness, chronic:   Moderate cognitive impairment.   --Continue PTA Sinemet 25/100 mg two tablets PO TID.      Glaucoma:   --Continue xalatan, cosopt.     Constipation:   --Continue colace, prn laxatives.    GERD:   --Continue PTA Prilosec.    Diet: Combination Diet Regular Diet Adult  Room Service     DVT Prophylaxis: Low Risk/Ambulatory with no VTE prophylaxis indicated   Corbett Catheter: not present  Code Status: DNR/DNI     Disposition Plan    Expected discharge: Tomorrow, recommended to prior living arrangement once heart rate stable with starting of dlitiazem.   Entered: Joaquim Anglin PA-C 01/28/2019, 7:36 AM          The patient has been discussed with Dr. Starkey, who agrees with the assessment and plan at this time.  Dr. Starkey will evaluate the patient independently.      Jayme Anglin PA-C   [phone]    Interval History   Patient was asleep in bed upon arrival.  He awakened easily.  He denied fever, chills, chest pain, shortness of breath or abdominal pain.      Reviewed plan for restarting lower dose of diltiazem and monitoring.    -Data reviewed today: I reviewed all new labs and imaging results over the last 24 hours. I personally reviewed no images or EKG's today.    Physical Exam   Temp: 97.9  F (36.6  C) Temp src: Oral BP: 159/48 Pulse: 51   Resp: 18 SpO2: 93 % O2 Device: None (Room air)    Vitals:    01/26/19 1349 01/27/19 0630 01/28/19 0500   Weight: 72.6 kg (160 lb) 72.4 kg (159 lb 9.6 oz) 71.1 kg (156 lb 11.2 oz)     Vital Signs with Ranges  Temp:  [97.7  F (36.5  C)-98.1  F (36.7  C)] 97.9  F (36.6  C)  Pulse:   [43-51] 51  Resp:  [16-18] 18  BP: (134-164)/(46-53) 159/48  SpO2:  [93 %-97 %] 93 %  I/O last 3 completed shifts:  In: 1360 [P.O.:360; I.V.:1000]  Out: 350 [Urine:350]      Constitutional: Awake, alert, cooperative, no apparent distress.    ENT: Normocephalic, without obvious abnormality, atraumatic, oral pharynx with moist mucus membranes, tonsils without erythema or exudates.  Neck: Supple, symmetrical, trachea midline, no adenopathy.  Pulmonary: No increased work of breathing, good air exchange, clear to auscultation bilaterally, no crackles or wheezing.  Cardiovascular: Regular rate and rhythm, normal S1 and S2, no S3 or S4, and no murmur noted.  GI: Normal bowel sounds, soft, non-distended, non-tender.  Skin/Integumen: Clear.  Neuro: CN II-XII grossly intact.  Psych:  Alert and oriented x 1. Normal affect.  Extremities: No lower extremity edema noted, and non-TTP bilaterally.       Medications       acetaminophen  1,000 mg Oral TID     aspirin  81 mg Oral Daily     carbidopa-levodopa  2 tablet Oral TID     docusate sodium  100 mg Oral BID     dorzolamide-timolol PF  1 drop Both Eyes BID     latanoprost  1 drop Both Eyes At Bedtime     lidocaine  1 patch Transdermal Q24H    And     lidocaine   Transdermal Q24H    And     lidocaine   Transdermal Q8H     omeprazole  20 mg Oral BID       Data   Recent Labs   Lab 01/28/19  0709 01/27/19  0633 01/26/19  1450   WBC 8.4 8.8 8.3   HGB 10.2* 9.7* 10.1*   MCV 91 91 92    214 210    141 142   POTASSIUM 3.9 3.9 4.2   CHLORIDE 113* 110* 108   CO2 23 26 27   BUN 36* 48* 59*   CR 1.51* 1.80* 2.03*   ANIONGAP 6 5 7   DON 8.4* 8.0* 8.9   GLC 93 96 104*   ALBUMIN  --  2.7*  --    PROTTOTAL  --  6.8  --    BILITOTAL  --  0.4  --    ALKPHOS  --  41  --    ALT  --  7  --    AST  --  22  --    LIPASE  --  78  --        No results found for this or any previous visit (from the past 24 hour(s)).

## 2019-01-28 NOTE — PROGRESS NOTES
Care Plan Summary Note: Pt A&Ox4 , VSS ex HR. Tele- S-rina. C/o  Back pain which worsens with movement-managed by scheduled Tylenol and Tramadol and warm pack. [facility] RN updated patient stay overnight and discharge with monitor in AM. ACE wraps on. Regular diet, IV-SL. A2/ wc-bound. Continue to monitor.    1400: with pain medication patient was able to sit by bedside with manageable pain. Continues to void appropriately.

## 2019-01-29 ENCOUNTER — HOSPITAL ENCOUNTER (OUTPATIENT)
Dept: CARDIOLOGY | Facility: CLINIC | Age: 84
Setting detail: OBSERVATION
End: 2019-01-29
Attending: PHYSICIAN ASSISTANT
Payer: MEDICARE

## 2019-01-29 ENCOUNTER — DOCUMENTATION ONLY (OUTPATIENT)
Dept: OTHER | Facility: CLINIC | Age: 84
End: 2019-01-29

## 2019-01-29 VITALS
HEART RATE: 59 BPM | BODY MASS INDEX: 23.87 KG/M2 | HEIGHT: 68 IN | RESPIRATION RATE: 18 BRPM | DIASTOLIC BLOOD PRESSURE: 61 MMHG | TEMPERATURE: 97.7 F | WEIGHT: 157.5 LBS | OXYGEN SATURATION: 96 % | SYSTOLIC BLOOD PRESSURE: 165 MMHG

## 2019-01-29 LAB
ANION GAP SERPL CALCULATED.3IONS-SCNC: 8 MMOL/L (ref 3–14)
BUN SERPL-MCNC: 34 MG/DL (ref 7–30)
CALCIUM SERPL-MCNC: 8.2 MG/DL (ref 8.5–10.1)
CHLORIDE SERPL-SCNC: 110 MMOL/L (ref 94–109)
CO2 SERPL-SCNC: 22 MMOL/L (ref 20–32)
CREAT SERPL-MCNC: 1.43 MG/DL (ref 0.66–1.25)
GFR SERPL CREATININE-BSD FRML MDRD: 41 ML/MIN/{1.73_M2}
GLUCOSE SERPL-MCNC: 91 MG/DL (ref 70–99)
POTASSIUM SERPL-SCNC: 4.1 MMOL/L (ref 3.4–5.3)
SODIUM SERPL-SCNC: 140 MMOL/L (ref 133–144)

## 2019-01-29 PROCEDURE — G0378 HOSPITAL OBSERVATION PER HR: HCPCS

## 2019-01-29 PROCEDURE — 93270 REMOTE 30 DAY ECG REV/REPORT: CPT

## 2019-01-29 PROCEDURE — 25000132 ZZH RX MED GY IP 250 OP 250 PS 637: Mod: GY | Performed by: INTERNAL MEDICINE

## 2019-01-29 PROCEDURE — 80048 BASIC METABOLIC PNL TOTAL CA: CPT | Performed by: PHYSICIAN ASSISTANT

## 2019-01-29 PROCEDURE — 25000132 ZZH RX MED GY IP 250 OP 250 PS 637: Mod: GY | Performed by: PHYSICIAN ASSISTANT

## 2019-01-29 PROCEDURE — A9270 NON-COVERED ITEM OR SERVICE: HCPCS | Mod: GY | Performed by: PHYSICIAN ASSISTANT

## 2019-01-29 PROCEDURE — 99214 OFFICE O/P EST MOD 30 MIN: CPT | Mod: GW | Performed by: INTERNAL MEDICINE

## 2019-01-29 PROCEDURE — A9270 NON-COVERED ITEM OR SERVICE: HCPCS | Mod: GY | Performed by: INTERNAL MEDICINE

## 2019-01-29 PROCEDURE — 99217 ZZC OBSERVATION CARE DISCHARGE: CPT | Performed by: PHYSICIAN ASSISTANT

## 2019-01-29 PROCEDURE — 93272 ECG/REVIEW INTERPRET ONLY: CPT | Performed by: INTERNAL MEDICINE

## 2019-01-29 PROCEDURE — 36415 COLL VENOUS BLD VENIPUNCTURE: CPT | Performed by: PHYSICIAN ASSISTANT

## 2019-01-29 RX ORDER — TRAMADOL HYDROCHLORIDE 50 MG/1
50 TABLET ORAL ONCE
Status: DISCONTINUED | OUTPATIENT
Start: 2019-01-29 | End: 2019-02-13

## 2019-01-29 RX ORDER — DILTIAZEM HYDROCHLORIDE 120 MG/1
120 CAPSULE, EXTENDED RELEASE ORAL DAILY
Qty: 30 CAPSULE | Refills: 0 | Status: SHIPPED | OUTPATIENT
Start: 2019-01-29 | End: 2019-02-28

## 2019-01-29 RX ORDER — TRAMADOL HYDROCHLORIDE 50 MG/1
25 TABLET ORAL EVERY 6 HOURS PRN
Qty: 10 TABLET | Refills: 0 | Status: SHIPPED | OUTPATIENT
Start: 2019-01-29 | End: 2028-02-13

## 2019-01-29 RX ORDER — ACETAMINOPHEN 500 MG
1000 TABLET ORAL 3 TIMES DAILY
Qty: 180 TABLET | Refills: 0 | COMMUNITY
Start: 2019-01-29 | End: 2019-02-28

## 2019-01-29 RX ORDER — LIDOCAINE 4 G/G
1 PATCH TOPICAL EVERY 24 HOURS
Qty: 30 PATCH | Refills: 0 | Status: SHIPPED | OUTPATIENT
Start: 2019-01-29 | End: 2019-02-28

## 2019-01-29 RX ORDER — DILTIAZEM HYDROCHLORIDE 120 MG/1
120 CAPSULE, EXTENDED RELEASE ORAL DAILY
Qty: 30 CAPSULE | Refills: 0 | Status: SHIPPED | OUTPATIENT
Start: 2019-01-29 | End: 2019-01-29

## 2019-01-29 RX ORDER — TRAMADOL HYDROCHLORIDE 50 MG/1
25 TABLET ORAL EVERY 6 HOURS PRN
Qty: 1 TABLET | Refills: 0 | Status: SHIPPED | OUTPATIENT
Start: 2019-01-29 | End: 2026-02-13

## 2019-01-29 RX ADMIN — OMEPRAZOLE 20 MG: 20 CAPSULE, DELAYED RELEASE ORAL at 10:25

## 2019-01-29 RX ADMIN — ACETAMINOPHEN 1000 MG: 500 TABLET, FILM COATED ORAL at 10:25

## 2019-01-29 RX ADMIN — DILTIAZEM HYDROCHLORIDE 120 MG: 120 CAPSULE, EXTENDED RELEASE ORAL at 10:26

## 2019-01-29 RX ADMIN — TRAMADOL HYDROCHLORIDE 50 MG: 50 TABLET, COATED ORAL at 12:01

## 2019-01-29 RX ADMIN — DORZOLAMIDE HYDROCHLORIDE AND TIMOLOL MALEATE 1 DROP: 20; 5 SOLUTION/ DROPS OPHTHALMIC at 12:01

## 2019-01-29 RX ADMIN — CARBIDOPA AND LEVODOPA 2 TABLET: 25; 100 TABLET ORAL at 10:25

## 2019-01-29 RX ADMIN — DOCUSATE SODIUM 100 MG: 100 CAPSULE, LIQUID FILLED ORAL at 10:26

## 2019-01-29 RX ADMIN — ASPIRIN 81 MG: 81 TABLET, COATED ORAL at 10:26

## 2019-01-29 ASSESSMENT — MIFFLIN-ST. JEOR: SCORE: 1313.92

## 2019-01-29 NOTE — PLAN OF CARE
PRIMARY DIAGNOSIS: ACUTE PAIN  OUTPATIENT/OBSERVATION GOALS TO BE MET BEFORE DISCHARGE:  1. Pain Status: Yes-By oral meds, scheduled Tylenol and Lidocaine patch,      2. Return to near baseline physical activity: Yes     3. Cleared for discharge by consultants (if involved): No     Discharge Planner Nurse   Safe discharge environment identified: Yes  Barriers to discharge: No

## 2019-01-29 NOTE — PROGRESS NOTES
No further heart blocks on tele. HR in the 50s on tele after 120 of Diltiazem. Asymptomatic. OK to stop dig, and back off dilt to 120 daily from 180 mg. Monitor at discharge and outpatient cards follow up with his team at [facility]. Call with questions.

## 2019-01-29 NOTE — PROGRESS NOTES
Discharge Planner   Discharge Plans in progress: Pt to return to nursing home with family coordinating transportation. Family requested Palliative Care consult which SW requested P.A. Write orders for. FRANCK spoke to  Hospice per family choice and gave them a heads up to expect order and sent email to  Hospice and SHELLEY Munguia Liason.     FRANCK obtained updated HC and Fin POA forms from dayana Bolanos. Scanned and emailed to [facility].    Barriers to discharge plan: FRANCK spoke to nursing home RN Henrry re: discharge plan.   nursing home RN expressed concern about patient's continued back pain and requested call from bedside nurse to address pain management.    Follow up plan: SW to continue coordinating discharge plan and fax discharge orders once complete. FRANCK updated bedside and charge nurse.       Entered by: Thu Stinson 01/29/2019 10:59 AM

## 2019-01-29 NOTE — PLAN OF CARE
Pt is A&O and at times forgetful, VSS on RA. Tele is sinus rina, heart rate low 50s-60s. Cardiologist has seen patient today, see note. Pt is using urinal and at times incontinent. IV site SL. Pt will be discharged tomorrow to [facility]. Right lower back pain managed with scheduled Tylenol and Lidocaine patch. Lidocaine patch in place. Pain increases with movement. Tolerating regular diet. Will continue to monitor.

## 2019-01-29 NOTE — PLAN OF CARE
PRIMARY DIAGNOSIS: ACUTE PAIN  OUTPATIENT/OBSERVATION GOALS TO BE MET BEFORE DISCHARGE:  1. Pain Status: Improved-controlled with oral pain medications.     2. Return to near baseline physical activity: Yes     3. Cleared for discharge by consultants (if involved): Yes     Discharge Planner Nurse   Safe discharge environment identified: Yes  Barriers to discharge: No       Entered by: Sweta Jennings RN 01/29/2019 1:30 PM     Please review provider order for any additional goals.   Nurse to notify provider when observation goals have been met and patient is ready for discharge.      Patient discharge back to [facility] today, Jailene Schwarz at bedside. RN updated Nurse Henrry @ [facility] via Phone of discharge and pain management. AVS copy given to Jailene Schwarz, verbalized understanding, Jailene Schwarz arranging ride for pt. Sent with [facility] monitor, follow up with cardiology appointment scheduled.

## 2019-01-29 NOTE — PROGRESS NOTES
Care Coordination:    Patient discharge back to [facility] today.  Met with  Chong and Patient in room to discuss discharge appointments, home care recommendations and follow up.  Chong voices that Pt's PCP, Dr. Carbajal comes to the facility on Tuesday and can see him then.  He wishes to move patient's cardiology to the  system.  Follow up appointment made with Dr. Saavedra for Feb. 13. 9:45.  Pt has already been  followed by HC. Family wishes to stay with them. Orders for HC, [facility] notified.  Chong will set up transport.  Bedside nurse updated of appts and will get holter monitor at discharge. SW following and will notify [facility] of D/C.  Bernadine Hernández RN case manager at [facility] ([phone]) and [facility]  ([phone])         Nicky Poole RN BSN  Care Coordinator

## 2019-01-29 NOTE — PROGRESS NOTES
A&O, VSS except bradycardic, tele sinus rina, pain in back worsens with movement, expected discharge later today to [facility], ACE wraps BLE, regular diet, A2 wheelchair, incontinent but will use urinal, report that son will be here in am and will facilitate transportation upon discharge, pain controled with tylenol and lido patch, continue to monitor

## 2019-01-29 NOTE — DISCHARGE SUMMARY
Admit Date:     01/26/2019   Discharge Date:     01/29/2019      PRIMARY CARE PHYSICIAN:  Dr. Wyatt Carbajal      DISCHARGE DIAGNOSES:   1.  Back pain.   2.  Sinus bradycardia in the setting of paroxysmal supraventricular tachycardia/atrial fibrillation.   3.  Pulmonary nodules.   4.  Normocytic anemia.   5.  Overactive bladder.   6.  Acute kidney injury in the setting of chronic kidney disease, stage III.   7.  Chronic lower extremity edema.   8.  Paroxysmal SVT.   9.  Hypertension.   10.  Parkinson's disease.   11.  Dementia.   12.  Right metatarsalgia.   13.  Right lower extremity weakness that is chronic.   14.  Glaucoma.   15.  Constipation.   16.  GERD.      DISCHARGE MEDICATIONS:       Review of your medicines      START taking      Dose / Directions   diltiazem  MG 24 hr capsule  Commonly known as:  DILT-XR  Used for:  Paroxysmal atrial fibrillation (H)      Dose:  120 mg  Take 1 capsule (120 mg) by mouth daily  Quantity:  30 capsule  Refills:  0     Lidocaine 4 % Patch  Commonly known as:  LIDOCARE  Used for:  Acute low back pain without sciatica, unspecified back pain laterality      Dose:  1 patch  Place 1 patch onto the skin every 24 hours  Quantity:  30 patch  Refills:  0     * traMADol 50 MG tablet  Commonly known as:  ULTRAM  Used for:  Acute low back pain without sciatica, unspecified back pain laterality      Dose:  25 mg  Take 0.5 tablets (25 mg) by mouth every 6 hours as needed for moderate pain  Quantity:  10 tablet  Refills:  0     * traMADol 50 MG tablet  Commonly known as:  ULTRAM  Used for:  Acute low back pain without sciatica, unspecified back pain laterality      Dose:  25 mg  Take 0.5 tablets (25 mg) by mouth every 6 hours as needed for severe pain  Quantity:  1 tablet  Refills:  0         * This list has 2 medication(s) that are the same as other medications prescribed for you. Read the directions carefully, and ask your doctor or other care provider to review them with you.             CONTINUE these medicines which may have CHANGED, or have new prescriptions. If we are uncertain of the size of tablets/capsules you have at home, strength may be listed as something that might have changed.      Dose / Directions   acetaminophen 500 MG tablet  Commonly known as:  TYLENOL  Indication:  Pain  This may have changed:  how much to take  Used for:  Acute low back pain without sciatica, unspecified back pain laterality      Dose:  1000 mg  Take 2 tablets (1,000 mg) by mouth 3 times daily 930am, 3pm and 9pm  Quantity:  180 tablet  Refills:  0     * bumetanide 0.5 MG tablet  Commonly known as:  BUMEX  Indication:  Localized edema  This may have changed:  Another medication with the same name was removed. Continue taking this medication, and follow the directions you see here.      Dose:  0.5 mg  Take 0.5 mg by mouth daily Administer with 1 mg tablet for total of 1.5 mg daily. If weight is over 164 lbs then notify nurse before administering medication.  Refills:  0     * bumetanide 2 MG tablet  Commonly known as:  BUMEX  Indication:  Localized edema  This may have changed:  Another medication with the same name was removed. Continue taking this medication, and follow the directions you see here.      Dose:  2 mg  Take 2 mg by mouth See Admin Instructions Take as directed if weight is over 164 lbs, notify nurse - one tablet for 3 days then resume 1.5 mg daily  Refills:  0     triamcinolone acetonide 0.05 % Oint  This may have changed:  additional instructions      Externally apply topically 2 times daily  Quantity:  430 g  Refills:  0         * This list has 2 medication(s) that are the same as other medications prescribed for you. Read the directions carefully, and ask your doctor or other care provider to review them with you.            CONTINUE these medicines which have NOT CHANGED      Dose / Directions   aspirin 81 MG EC tablet      Dose:  81 mg  Take 81 mg by mouth daily 930am  Refills:  0      calcitRIOL 0.25 MCG capsule  Commonly known as:  ROCALTROL      Dose:  0.25 mcg  Take 0.25 mcg by mouth At Bedtime 9pm  Refills:  0     camphor-menthol 0.5-0.5 % external lotion  Commonly known as:  DERMASARRA      Apply topically 2 times daily Apply cream to back area twice daily 930am and 9pm  Refills:  0     clobetasol 0.05 % external cream  Commonly known as:  TEMOVATE      Apply topically 2 times daily Apply to rash on arms and back twice daily 930am and 9pm  Refills:  0     docusate sodium 100 MG tablet  Commonly known as:  COLACE      Dose:  100 mg  Take 100 mg by mouth 2 times daily 930am and 9pm  Refills:  0     dorzolamide-timolol 2-0.5 % ophthalmic solution  Commonly known as:  COSOPT      Dose:  1 drop  Place 1 drop into both eyes 2 times daily 930am and 9pm  Refills:  0     latanoprost 0.005 % ophthalmic solution  Commonly known as:  XALATAN      Dose:  1 drop  Place 1 drop into both eyes At Bedtime Give 15 minutes before all other drops at 9pm  Refills:  0     magnesium hydroxide 400 MG/5ML suspension  Commonly known as:  MILK OF MAGNESIA      Dose:  30 mL  Take 30 mLs by mouth every other day At bedtime every other day at 9pm  Refills:  0     * Multi-vitamin tablet      Dose:  1 tablet  Take 1 tablet by mouth daily [employer] at 9pm  Refills:  0     * PRESERVISION AREDS 2+MULTI VIT PO      Dose:  1 tablet  Take 1 tablet by mouth At Bedtime 9pm  Refills:  0     omeprazole 20 MG DR capsule  Commonly known as:  priLOSEC      Dose:  20 mg  Take 20 mg by mouth 2 times daily 920am and 9m  Refills:  0     senna-docusate 8.6-50 MG tablet  Commonly known as:  SENOKOT-S/PERICOLACE  Used for:  Generalized muscle weakness      Dose:  2 tablet  Take 2 tablets by mouth 2 times daily as needed for constipation  Quantity:  100 tablet  Refills:  0     SINEMET  MG tablet  Generic drug:  carbidopa-levodopa      Dose:  2 tablet  Take 2 tablets by mouth 3 times daily 930am, 3pm, 9pm  Refills:  0         *  This list has 2 medication(s) that are the same as other medications prescribed for you. Read the directions carefully, and ask your doctor or other care provider to review them with you.            STOP taking    CARTIA  MG 24 hr capsule  Generic drug:  diltiazem ER COATED BEADS        digoxin 125 MCG tablet  Commonly known as:  LANOXIN              Where to get your medicines      These medications were sent to [facility] [place], [place] - [address]  [address], [place] [address]    Phone:  [phone]     Lidocaine 4 % Patch     Some of these will need a paper prescription and others can be bought over the counter. Ask your nurse if you have questions.    Bring a paper prescription for each of these medications    diltiazem  MG 24 hr capsule    traMADol 50 MG tablet    traMADol 50 MG tablet  You don't need a prescription for these medications    acetaminophen 500 MG tablet         ALLERGIES:    Allergies   Allergen Reactions     Penicillins Unknown         DISPOSITION:  Home.      FOLLOW UP WITH RECOMMENDATIONS:  The patient will follow up with primary care provider within 1 week to evaluate medication changes and for hospital follow-up.  Recommend checking a basic metabolic panel within the week and a TSH, free T4 level in 4-6 weeks.      Follow up with Dr. Saavedra as scheduled for 02/13/2019 at 9:45 in the morning.      ACTIVITY:  As tolerated.      DIET:  Recommend a low salt regular diet.      DISCHARGE INSTRUCTIONS:  The patient will continue with lower extremity wrapping as previously performed at home with placement in the morning and removed at night.      ADDITIONAL SERVICES:  The patient will return home with home referrals for home RN, PT, OT and have requested a Palliative Care referral.      CONSULTS:  Cardiology.      LABORATORY IMAGING AND PROCEDURES:   1.  Routine laboratory studies that included a urinalysis, CBC with platelet differential, basic  metabolic panel, digoxin level, urine culture, repeat basic metabolic panel, inflammatory CRP, ESR, hepatic panel, CBC with platelet differential, lipase level, TSH with free T4.  Repeat digoxin level.  Repeat basic metabolic panel x2 and repeat CBC with platelets.   2.  Abdomen and pelvis CT without contrast.   3.  Lumbar spine CT without contrast.   4.  Lumbar spine MRI without contrast.   5.  EKG.   6.  Complete echocardiogram.      PENDING RESULTS:  None.      FUTURE RESULTS:  The patient is being set up with a 2-week Holter monitor at time of discharge.      PHYSICAL EXAMINATION:    VITAL SIGNS:  On day of discharge, temperature is 97.7 degrees Fahrenheit with a blood pressure of 165/61, heart rate of 59 beats per minute, respiratory rate of 18, O2 saturation 96% on room air.  The patient was rating his pain 7/10, but had not received any analgesic management prior to my assessment.     GENERAL:  The patient is awake, alert and cooperative, in no apparent distress, though grimaces out in pain when moving his lower extremity.     HEENT:  Normocephalic, atraumatic.  Moist mucous membranes present.  No exudates noted in the posterior pharynx.  Uvula is midline.     NECK:  Supple, normal range of motion.  No tracheal deviation.  No cervical lymphadenopathy present.     CARDIOVASCULAR:  Regular rate and rhythm.  No rubs, murmurs or gallops appreciated.     PULMONARY:  Lungs are clear to auscultation bilaterally.  No wheezes, rhonchi or rales appreciated.     GASTROINTESTINAL:  Bowel sounds are present in all 4 quadrants, soft, nontender, nondistended.     NEUROLOGIC:  Cranial nerves II-XII appear grossly intact.  The patient is seen moving all 4 extremities.     EXTREMITIES:  The patient has Ace wraps in place.  Does not appear to be edematous and calves are nontender to palpation.      BRIEF HISTORY OF PRESENTING ILLNESS:  Loco Hawkins is a 89-year-old male with past medical history significant for paroxysmal  supraventricular tachycardia, hypertension, hyperlipidemia, Parkinson's disease, dementia, glaucoma as well as gastroesophageal reflux disease, chronic kidney disease stage III, chronic right lower extremity weakness who was registered to observation due to back pain.      HOSPITAL COURSE:   1.  Back pain:  This improved with scheduled Tylenol and lidocaine patch and as-needed Ultram, which will be continued at discharge.  The patient was placed on a bowel regimen to avoid constipation.  The patient was evaluated by PT and is being sent home with referrals for home RN, PT, OT and palliative care referral.  The patient underwent a workup, including inflammatory CRP that is elevated at 25.1 and an ESR that was elevated at 32.  Abdomen and pelvis CT was performed without findings of acute pathology, though noted a few tiny pulmonary nodules.  MRI of the lumbar spine as well as CT of the lumbar spine without contrast were performed which were negative for acute pathology.  It is felt the patient's exercise program at facility and working with PT likely caused the strain, as no acute pathology was discovered.   2.  Sinus bradycardia:  The patient was noted to have heart rates as low as in the 30s.  Digoxin level was within normal limits and EKG confirmed sinus bradycardia.  He was monitored on continuous telemetry and evaluated by Cardiology.  His prior to admit digoxin and diltiazem were held for 2 days.  He will ultimately discontinue digoxin completely and start a reduced dose of diltiazem 120 mg extended release daily.  The patient also underwent an echocardiogram that was unremarkable.  The patient will be discharged with a 14-day Holter monitor and follow up with Cardiology in 2 weeks.   3.  Incidental pulmonary nodules:  This is noted on CT.  Discussed with patient and patient's nephew and indicates no aggressive workup is indicated.   4.  Normocytic anemia:  The patient was without active bleeding and this  remained stable.   5.  Overactive bladder:  The patient had complained of the urge to go frequently, though small amounts.  Bladder scans were performed and were minimal.  This could be secondary to the Parkinson's disease; however, he was receiving IV fluids due to acute kidney injury, which could have also been contributing.   6.  Acute kidney injury on chronic kidney disease stage III and noted chronic lower extremity edema:  Baseline creatinine usually hovers between 1.2 and 1.5 and upon admission was elevated at 2.03.  Urinalysis was unremarkable.  Echocardiogram was performed with a preserved EF without significant valvular disease.  The patient's prior to admit Bumex was held and patient received 1 liter IV fluids and continuous infusion for 1 day.  I's and O's and daily weights were monitored and renal function improved back to baseline.  At time of discharge, Bumex 1.5 mg has been reduced to 0.5 mg daily, with plans to follow up with primary care provider.  The patient will continue with as-needed Bumex for weight gain as previously indicated.   7.  Paroxysmal SVT and hypertension:  The [facility] follows with [facility]; however, the patient's nephew is requesting transfer over to [facility] so everything is in one system.  The patient has been set up to see Dr. Saavedra in 2 weeks.  Medications have been adjusted; digoxin has been discontinued, diltiazem has been decreased to 120 mg daily, and his baby aspirin has been continued daily.  Again, he is being sent out with a 14-day Holter monitor for monitoring of any SVT or persistent bradycardia.   8.  Parkinson's disease with dementia, right metatarsalgia and right lower extremity weakness that is chronic:  The patient has noted moderate cognitive impairment, is wheelchair bound at baseline.  He was continued on prior to admit Sinemet 3 times daily, which will be resumed at time of discharge.   9.  Glaucoma:  The patient's prior  to admit eyedrops were continued during this stay and will be resumed at time of discharge.   10.  Constipation:  The patient was continued on as-needed laxatives and daily Colace, which will be continued at time of discharge.   11.  GERD:  The patient's prior to admit Prilosec was continued and will be resumed at time of discharge.      CODE STATUS:  The patient is DNR/DNI.      The patient was discussed with Dr. Danial Bolaños, who agrees with discharge at this time.  Dr. Bolaños will evaluate the patient independently.      TOTAL DISCHARGE TIME:  Less than 30 minutes.         DANIAL BOLAÑOS MD       As dictated by NUBIA FERRARI PA-C            D: 2019   T: 2019   MT: ARMANDO      Name:     CARLOS POLK   MRN:      [id number]        Account:        [id number]   :      10/31/1921           Admit Date:     2019                                  Discharge Date: 2019      Document: [id number]       cc: Wyatt Carbajal MD

## 2019-01-29 NOTE — PLAN OF CARE
PRIMARY DIAGNOSIS: ACUTE PAIN  OUTPATIENT/OBSERVATION GOALS TO BE MET BEFORE DISCHARGE:  1. Pain Status: Yes-By oral meds, scheduled Tylenol     2. Return to near baseline physical activity: Yes     3. Cleared for discharge by consultants (if involved): No     Discharge Planner Nurse   Safe discharge environment identified: Yes  Barriers to discharge: No

## 2019-01-29 NOTE — DISCHARGE INSTRUCTIONS
Your doctor has ordered home care to help you after your hospital stay.  They will contact you regarding your 1st visit.  The service will be provided by [facility].  If you have not received a call within 48hrs of discharge, please call them at [phone]

## 2019-01-29 NOTE — PLAN OF CARE
PRIMARY DIAGNOSIS: ACUTE PAIN  OUTPATIENT/OBSERVATION GOALS TO BE MET BEFORE DISCHARGE:  1. Pain Status: Improved-controlled with oral pain medications.    2. Return to near baseline physical activity: Yes    3. Cleared for discharge by consultants (if involved): No    Discharge Planner Nurse   Safe discharge environment identified: Yes  Barriers to discharge: No       Entered by: Yani Hyde 01/29/2019 6:02 AM     Please review provider order for any additional goals.   Nurse to notify provider when observation goals have been met and patient is ready for discharge.

## 2019-01-29 NOTE — PLAN OF CARE
PRIMARY DIAGNOSIS: ACUTE PAIN  OUTPATIENT/OBSERVATION GOALS TO BE MET BEFORE DISCHARGE:  1. Pain Status: Improved-controlled with oral pain medications.    2. Return to near baseline physical activity: Yes    3. Cleared for discharge by consultants (if involved): No    Discharge Planner Nurse   Safe discharge environment identified: Yes  Barriers to discharge: No       Entered by: Yani Hyde 01/29/2019 2:32 AM     Please review provider order for any additional goals.   Nurse to notify provider when observation goals have been met and patient is ready for discharge.

## 2019-01-30 LAB — INTERPRETATION ECG - MUSE: NORMAL

## 2019-02-01 ENCOUNTER — DOCUMENTATION ONLY (OUTPATIENT)
Dept: OTHER | Facility: CLINIC | Age: 84
End: 2019-02-01

## 2019-02-05 ENCOUNTER — MEDICAL CORRESPONDENCE (OUTPATIENT)
Dept: HEALTH INFORMATION MANAGEMENT | Facility: CLINIC | Age: 84
End: 2019-02-05

## 2019-02-08 ENCOUNTER — MEDICAL CORRESPONDENCE (OUTPATIENT)
Dept: HEALTH INFORMATION MANAGEMENT | Facility: CLINIC | Age: 84
End: 2019-02-08

## 2019-02-11 ENCOUNTER — TELEPHONE (OUTPATIENT)
Dept: CARDIOLOGY | Facility: CLINIC | Age: 84
End: 2019-02-11

## 2019-02-11 NOTE — TELEPHONE ENCOUNTER
Chart prep. 14 day holter still pending (pt wearing through 2/13) - messaged Dr. Saavedra to review and let me know if I should r/s the visit so he has results --> he was in agreement. I called pt's Medical [facility]. Reviewed this with him. It is difficult to get pt transported at 89 years old, also Chong comes from [place], so he requested a later morning appt. Next appt in late morning available is mid March. There is a 0745 or 0818 on 2/22 but that is too difficult to get to, which is understandable given pt's age and the travel requirements. Decided to leave appt as-is, can call w/results after OV if it reveals anything significant.    I called pt's [facility]. Left VM 3:15 PM February 11, 2019 requesting pt's Last  OV note, med list, Vitals including especially his HR and BP to us tomorrow 2/12/19. Requested they fax this to us, and call for questions/concerns and to confirm they got this message.    Hopefully, we can at least get pt's HR's and BP's to see what they've been running, until the Holter results are received.    Treasure Reese, RN February 11, 2019, 3:20 PM  RN Care Coordinator  [facility]

## 2019-02-12 ENCOUNTER — TRANSFERRED RECORDS (OUTPATIENT)
Dept: HEALTH INFORMATION MANAGEMENT | Facility: CLINIC | Age: 84
End: 2019-02-12

## 2019-02-12 ENCOUNTER — RECORDS - HEALTHEAST (OUTPATIENT)
Dept: LAB | Facility: CLINIC | Age: 84
End: 2019-02-12

## 2019-02-12 LAB
ANION GAP SERPL CALCULATED.3IONS-SCNC: 11 MMOL/L (ref 5–18)
BUN SERPL-MCNC: 45 MG/DL (ref 8–28)
CALCIUM SERPL-MCNC: 9 MG/DL (ref 8.5–10.5)
CHLORIDE BLD-SCNC: 104 MMOL/L (ref 98–107)
CO2 SERPL-SCNC: 26 MMOL/L (ref 22–31)
CREAT SERPL-MCNC: 2.05 MG/DL (ref 0.7–1.3)
GFR SERPL CREATININE-BSD FRML MDRD: 30 ML/MIN/1.73M2
GLUCOSE BLD-MCNC: 95 MG/DL (ref 70–125)
POTASSIUM BLD-SCNC: 4.2 MMOL/L (ref 3.5–5)
SODIUM SERPL-SCNC: 141 MMOL/L (ref 136–145)
T3 SERPL-MCNC: 53 NG/DL (ref 45–175)
T4 FREE SERPL-MCNC: 0.9 NG/DL (ref 0.7–1.8)
TSH SERPL DL<=0.005 MIU/L-ACNC: 2.21 UIU/ML (ref 0.3–5)

## 2019-02-13 ENCOUNTER — OFFICE VISIT (OUTPATIENT)
Dept: CARDIOLOGY | Facility: CLINIC | Age: 84
End: 2019-02-13
Payer: MEDICARE

## 2019-02-13 VITALS
BODY MASS INDEX: 23.04 KG/M2 | SYSTOLIC BLOOD PRESSURE: 135 MMHG | DIASTOLIC BLOOD PRESSURE: 58 MMHG | HEART RATE: 57 BPM | WEIGHT: 152 LBS | HEIGHT: 68 IN

## 2019-02-13 DIAGNOSIS — R00.1 BRADYCARDIA: Primary | ICD-10-CM

## 2019-02-13 PROCEDURE — 99203 OFFICE O/P NEW LOW 30 MIN: CPT | Performed by: INTERNAL MEDICINE

## 2019-02-13 PROCEDURE — 93000 ELECTROCARDIOGRAM COMPLETE: CPT | Performed by: INTERNAL MEDICINE

## 2019-02-13 ASSESSMENT — MIFFLIN-ST. JEOR: SCORE: 1288.97

## 2019-02-13 NOTE — LETTER
2/13/2019    Wyatt Carbajal MD  [facility] [address]  [facility] [address]    RE: Loco Hawkins       Dear Colleague,    I had the pleasure of seeing Loco Hawkins in the [facility].    HPI and Plan:   See dictation [id number]    Orders Placed This Encounter   Procedures     Follow-Up with Cardiologist     EKG 12-lead complete w/read - Clinics (performed today)     No orders of the defined types were placed in this encounter.    Medications Discontinued During This Encounter   Medication Reason     traMADol (ULTRAM) tablet 50 mg          Encounter Diagnosis   Name Primary?     Bradycardia Yes       CURRENT MEDICATIONS:  Current Outpatient Medications   Medication Sig Dispense Refill     acetaminophen (TYLENOL) 500 MG tablet Take 2 tablets (1,000 mg) by mouth 3 times daily 930am, 3pm and 9pm 180 tablet 0     aspirin 81 MG EC tablet Take 81 mg by mouth daily 930am       bumetanide (BUMEX) 0.5 MG tablet Take 0.5 mg by mouth daily Administer with 1 mg tablet for total of 1.5 mg daily. If weight is over 164 lbs then notify nurse before administering medication.       bumetanide (BUMEX) 2 MG tablet Take 2 mg by mouth See Admin Instructions Take as directed if weight is over 164 lbs, notify nurse - one tablet for 3 days then resume 1.5 mg daily       calcitRIOL (ROCALTROL) 0.25 MCG capsule Take 0.25 mcg by mouth At Bedtime 9pm       camphor-menthol (DERMASARRA) 0.5-0.5 % external lotion Apply topically 2 times daily Apply cream to back area twice daily 930am and 9pm       carbidopa-levodopa (SINEMET)  MG per tablet Take 2 tablets by mouth 3 times daily 930am, 3pm, 9pm       clobetasol (TEMOVATE) 0.05 % external cream Apply topically 2 times daily Apply to rash on arms and back twice daily 930am and 9pm       diltiazem ER (DILT-XR) 120 MG 24 hr capsule Take 1 capsule (120 mg) by mouth daily 30 capsule 0     docusate sodium (COLACE) 100 MG tablet  Take 100 mg by mouth 2 times daily 930am and 9pm       dorzolamide-timolol (COSOPT) 2-0.5 % ophthalmic solution Place 1 drop into both eyes 2 times daily 930am and 9pm       latanoprost (XALATAN) 0.005 % ophthalmic solution Place 1 drop into both eyes At Bedtime Give 15 minutes before all other drops at 9pm       Lidocaine (LIDOCARE) 4 % Patch Place 1 patch onto the skin every 24 hours 30 patch 0     magnesium hydroxide (MILK OF MAGNESIA) 400 MG/5ML suspension Take 30 mLs by mouth every other day At bedtime every other day at 9pm       Multiple Vitamins-Minerals (PRESERVISION AREDS 2+MULTI VIT PO) Take 1 tablet by mouth At Bedtime 9pm       multivitamin, therapeutic with minerals (MULTI-VITAMIN) TABS tablet Take 1 tablet by mouth daily Cerovite Senior at 9pm       omeprazole (PRILOSEC) 20 MG DR capsule Take 20 mg by mouth 2 times daily 920am and 9m       senna-docusate (SENOKOT-S;PERICOLACE) 8.6-50 MG per tablet Take 2 tablets by mouth 2 times daily as needed for constipation 100 tablet      traMADol (ULTRAM) 50 MG tablet Take 0.5 tablets (25 mg) by mouth every 6 hours as needed for moderate pain (Patient taking differently: Take 25 mg by mouth At Bedtime ) 10 tablet 0     traMADol (ULTRAM) 50 MG tablet Take 0.5 tablets (25 mg) by mouth every 6 hours as needed for severe pain 1 tablet 0     triamcinolone acetonide 0.05 % OINT Externally apply topically 2 times daily (Patient taking differently: Externally apply topically 2 times daily Apply one applicator of ointment to both arms and back twice daily. Put on after the clobetasol cream for rash) 430 g 0       ALLERGIES     Allergies   Allergen Reactions     Penicillins Unknown       PAST MEDICAL HISTORY:  Past Medical History:   Diagnosis Date     Dementia      Parkinson's disease (H)        PAST SURGICAL HISTORY:  History reviewed. No pertinent surgical history.    FAMILY HISTORY:  History reviewed. No pertinent family history.    SOCIAL HISTORY:  Social History  "    Socioeconomic History     Marital status:      Spouse name: None     Number of children: None     Years of education: None     Highest education level: None   Social Needs     Financial resource strain: None     Food insecurity - worry: None     Food insecurity - inability: None     Transportation needs - medical: None     Transportation needs - non-medical: None   Occupational History     None   Tobacco Use     Smoking status: Former Smoker     Smokeless tobacco: Never Used   Substance and Sexual Activity     Alcohol use: No     Drug use: No     Sexual activity: None   Other Topics Concern     Parent/sibling w/ CABG, MI or angioplasty before 65F 55M? Not Asked   Social History Narrative     None       Review of Systems:  Skin:        Eyes:  Positive for glasses  ENT:  Positive for hearing loss  Respiratory:  Negative    Cardiovascular:    Positive for;palpitations;edema  Gastroenterology: not assessed    Genitourinary:  not assessed    Musculoskeletal:  not assessed    Neurologic:  Negative    Psychiatric:  not assessed    Heme/Lymph/Imm:  Positive for allergies  Endocrine:  Negative      Physical Exam:  Vitals: /58   Pulse 57   Ht 1.727 m (5' 8\")   Wt 68.9 kg (152 lb)   BMI 23.11 kg/m       Recent Lab Results:  LIPID RESULTS:  No results found for: CHOL, HDL, LDL, TRIG, CHOLHDLRATIO    LIVER ENZYME RESULTS:  Lab Results   Component Value Date    AST 22 01/27/2019    ALT 7 01/27/2019       CBC RESULTS:  Lab Results   Component Value Date    WBC 8.4 01/28/2019    RBC 3.54 (L) 01/28/2019    HGB 10.2 (L) 01/28/2019    HCT 32.2 (L) 01/28/2019    MCV 91 01/28/2019    MCH 28.8 01/28/2019    MCHC 31.7 01/28/2019    RDW 16.2 (H) 01/28/2019     01/28/2019       BMP RESULTS:  Lab Results   Component Value Date     01/29/2019    POTASSIUM 4.1 01/29/2019    CHLORIDE 110 (H) 01/29/2019    CO2 22 01/29/2019    ANIONGAP 8 01/29/2019    GLC 91 01/29/2019    BUN 34 (H) 01/29/2019    CR 1.43 (H) " 01/29/2019    GFRESTIMATED 41 (L) 01/29/2019    GFRESTBLACK 47 (L) 01/29/2019    DON 8.2 (L) 01/29/2019        A1C RESULTS:  No results found for: A1C    INR RESULTS:  No results found for: INR        CC  Wyatt Carbajal MD, MD  [facility]  [address]  [place] [address]                    Service Date: 02/13/2019      REASON FOR VISIT:  Bradycardia and SVT.      HISTORY OF PRESENT ILLNESS:  Mr. Hawkins is a very pleasant 89-year-old male, a poor historian, here with his son, Chong.  I first met Mr. Hawkins when I was in inpatient service in 01/2019.  The patient has a history of paroxysmal supraventricular tachycardia that was followed up previously at the [facility].  He was last seen there in 2017.  He was maintained on 5 times weekly digoxin and 180 mg of diltiazem for that.  He was admitted in January of this year with back pain and weakness in the setting of other comorbidities and acute kidney injury.  He underwent conservative management but during that stay was noted to have sinus bradycardia into the 30s on the monitor.  We stopped his digoxin and backed off his diltiazem to 120 mg daily and recommended an outpatient follow up with Cardiology for which he is here today.  We also recommended a 14-day monitor which he is currently wearing.  Echocardiogram during that stay showed normal LV function with aortic valve sclerosis but no other major findings.      The patient has multiple other medical comorbidities that include pulmonary nodules, anemia, overactive bladder, prostate issues, stage III kidney disease, lower extremity edema which is thought to be lymphedema, hypertension, Parkinson's disease, dementia, glaucoma, constipation and GERD.  He lives in an assisted living.  His son, Chong, tells me today that they are considering hospice and they have an appointment to discuss all of that tomorrow.      Loco feels well overall from a cardiac  standpoint and denies any syncope or presyncopal spells.  No lightheadedness.  No chest pain, shortness of breath, but he is wheelchair-bound.      PHYSICAL EXAMINATION:   VITAL SIGNS:  Blood pressure is 135/58 with a pulse of 57.   GENERAL:  Alert, oriented x2, in no acute distress.  Elderly, frail-appearing gentleman in a wheelchair.     LUNGS:  Clear to auscultation bilaterally.     NECK:  Neck is stiff, but JVP appears to be around 10 cm.    ABDOMEN:  Soft, nontender.   CARDIAC:  Cardiac exam reveals normal first and second heart sound, bradycardic into the 50s, but a soft ejection systolic murmur.  No S3, S4.   EXTREMITIES:  Have 1+ bilateral edema.      PERTINENT DATA:  Creatinine is 1.4, sodium 140, potassium 4.1, hemoglobin is 10.2.  Last TSH was 9.14, but his free T4 was normal.  ECG today shows sinus bradycardia with nonspecific ST-T flattening but otherwise no major changes.  Echocardiogram from 01/2019 shows normal biventricular function and aortic valve sclerosis.      ASSESSMENT AND PLAN:  This is a 89-year-old pleasant gentleman, poor historian, who is here with his son, Chong, who has multiple comorbidities including:  Dementia, Parkinson's, hypertension, CKD and he is wheelchair-bound and has a history of paroxysmal SVT for which he was in the past maintained on diltiazem and digoxin, admitted in 01/2019 with bradycardia which led to discontinuation of his digoxin and reduction in the dose of diltiazem from 180 down to 120 mg daily.  He is currently wearing a Zio Patch monitor.  We will await results of that and follow up.  If he has any further episodes, we may have to discuss backing off on the diltiazem, but otherwise he is asymptomatic and for his age of 89 he has done well.  I will see him back as needed or in 1 year.  Otherwise, he denies any cardiac symptoms at this point.  He can continue to follow up with his PCP at his assisted living facility for his CKD, anemia and thyroid.  If the Zio  indeed shows a lot of bradycardic episodes, my recommendation will be to first discuss with his PCP to see if a low dose of thyroid supplement may help given his subclinical hypothyroidism.  I will allow his PCP to follow up on his renal function and manage his diuretics, which are given for symptomatic lower extremity edema, but lungs are clear and JVP appears to be normal at this time.      cc:   Wyatt Carbajal MD    [facility] Phys[facility]    [address] [place] [address]  [id number]         CORWIN SAAVEDRA MD             D: 2019   T: 2019   MT: REBEL      Name:     CARLOS POLK   MRN:      [id number]        Account:      [id number]   :      10/31/1921           Service Date: 2019      Document: [id number]         Thank you for allowing me to participate in the care of your patient.      Sincerely,     Corwin Saavedra MD     [facility]    cc:   Wyatt Carbajal MD, MD  [facility] PHYSICIAN SERVICES  [address] [place] [id number]

## 2019-02-13 NOTE — PROGRESS NOTES
Service Date: 02/13/2019      REASON FOR VISIT:  Bradycardia and SVT.      HISTORY OF PRESENT ILLNESS:  Mr. Hawkins is a very pleasant 89-year-old male, a poor historian, here with his son, Chong.  I first met Mr. Hawkins when I was in inpatient service in 01/2019.  The patient has a history of paroxysmal supraventricular tachycardia that was followed up previously at the [facility].  He was last seen there in 2017.  He was maintained on 5 times weekly digoxin and 180 mg of diltiazem for that.  He was admitted in January of this year with back pain and weakness in the setting of other comorbidities and acute kidney injury.  He underwent conservative management but during that stay was noted to have sinus bradycardia into the 30s on the monitor.  We stopped his digoxin and backed off his diltiazem to 120 mg daily and recommended an outpatient follow up with Cardiology for which he is here today.  We also recommended a 14-day monitor which he is currently wearing.  Echocardiogram during that stay showed normal LV function with aortic valve sclerosis but no other major findings.      The patient has multiple other medical comorbidities that include pulmonary nodules, anemia, overactive bladder, prostate issues, stage III kidney disease, lower extremity edema which is thought to be lymphedema, hypertension, Parkinson's disease, dementia, glaucoma, constipation and GERD.  He lives in an assisted living.  His son, Chong, tells me today that they are considering hospice and they have an appointment to discuss all of that tomorrow.      Loco feels well overall from a cardiac standpoint and denies any syncope or presyncopal spells.  No lightheadedness.  No chest pain, shortness of breath, but he is wheelchair-bound.      PHYSICAL EXAMINATION:   VITAL SIGNS:  Blood pressure is 135/58 with a pulse of 57.   GENERAL:  Alert, oriented x2, in no acute distress.  Elderly, frail-appearing gentleman in a wheelchair.      LUNGS:  Clear to auscultation bilaterally.     NECK:  Neck is stiff, but JVP appears to be around 10 cm.    ABDOMEN:  Soft, nontender.   CARDIAC:  Cardiac exam reveals normal first and second heart sound, bradycardic into the 50s, but a soft ejection systolic murmur.  No S3, S4.   EXTREMITIES:  Have 1+ bilateral edema.      PERTINENT DATA:  Creatinine is 1.4, sodium 140, potassium 4.1, hemoglobin is 10.2.  Last TSH was 9.14, but his free T4 was normal.  ECG today shows sinus bradycardia with nonspecific ST-T flattening but otherwise no major changes.  Echocardiogram from 01/2019 shows normal biventricular function and aortic valve sclerosis.      ASSESSMENT AND PLAN:  This is a 89-year-old pleasant gentleman, poor historian, who is here with his son, Chong, who has multiple comorbidities including:  Dementia, Parkinson's, hypertension, CKD and he is wheelchair-bound and has a history of paroxysmal SVT for which he was in the past maintained on diltiazem and digoxin, admitted in 01/2019 with bradycardia which led to discontinuation of his digoxin and reduction in the dose of diltiazem from 180 down to 120 mg daily.  He is currently wearing a Zio Patch monitor.  We will await results of that and follow up.  If he has any further episodes, we may have to discuss backing off on the diltiazem, but otherwise he is asymptomatic and for his age of 89 he has done well.  I will see him back as needed or in 1 year.  Otherwise, he denies any cardiac symptoms at this point.  He can continue to follow up with his PCP at his assisted living facility for his CKD, anemia and thyroid.  If the Zio indeed shows a lot of bradycardic episodes, my recommendation will be to first discuss with his PCP to see if a low dose of thyroid supplement may help given his subclinical hypothyroidism.  I will allow his PCP to follow up on his renal function and manage his diuretics, which are given for symptomatic lower extremity edema, but lungs  are clear and JVP appears to be normal at this time.      cc:   Wyatt Carbajal MD    [facility] Physician Services    [address] [place] [address], [address]    [place]  [address]         AL MANZO MD             D: 2019   T: 2019   MT: REBEL      Name:     CARLOS POLK   MRN:      [id number]        Account:      [id number]   :      10/31/1921           Service Date: 2019      Document: [id number]

## 2019-02-13 NOTE — PROGRESS NOTES
HPI and Plan:   See dictation [id number]    Orders Placed This Encounter   Procedures     Follow-Up with Cardiologist     EKG 12-lead complete w/read - Clinics (performed today)     No orders of the defined types were placed in this encounter.    Medications Discontinued During This Encounter   Medication Reason     traMADol (ULTRAM) tablet 50 mg          Encounter Diagnosis   Name Primary?     Bradycardia Yes       CURRENT MEDICATIONS:  Current Outpatient Medications   Medication Sig Dispense Refill     acetaminophen (TYLENOL) 500 MG tablet Take 2 tablets (1,000 mg) by mouth 3 times daily 930am, 3pm and 9pm 180 tablet 0     aspirin 81 MG EC tablet Take 81 mg by mouth daily 930am       bumetanide (BUMEX) 0.5 MG tablet Take 0.5 mg by mouth daily Administer with 1 mg tablet for total of 1.5 mg daily. If weight is over 164 lbs then notify nurse before administering medication.       bumetanide (BUMEX) 2 MG tablet Take 2 mg by mouth See Admin Instructions Take as directed if weight is over 164 lbs, notify nurse - one tablet for 3 days then resume 1.5 mg daily       calcitRIOL (ROCALTROL) 0.25 MCG capsule Take 0.25 mcg by mouth At Bedtime 9pm       camphor-menthol (DERMASARRA) 0.5-0.5 % external lotion Apply topically 2 times daily Apply cream to back area twice daily 930am and 9pm       carbidopa-levodopa (SINEMET)  MG per tablet Take 2 tablets by mouth 3 times daily 930am, 3pm, 9pm       clobetasol (TEMOVATE) 0.05 % external cream Apply topically 2 times daily Apply to rash on arms and back twice daily 930am and 9pm       diltiazem ER (DILT-XR) 120 MG 24 hr capsule Take 1 capsule (120 mg) by mouth daily 30 capsule 0     docusate sodium (COLACE) 100 MG tablet Take 100 mg by mouth 2 times daily 930am and 9pm       dorzolamide-timolol (COSOPT) 2-0.5 % ophthalmic solution Place 1 drop into both eyes 2 times daily 930am and 9pm       latanoprost (XALATAN) 0.005 % ophthalmic solution Place 1 drop into both eyes At Bedtime  Give 15 minutes before all other drops at 9pm       Lidocaine (LIDOCARE) 4 % Patch Place 1 patch onto the skin every 24 hours 30 patch 0     magnesium hydroxide (MILK OF MAGNESIA) 400 MG/5ML suspension Take 30 mLs by mouth every other day At bedtime every other day at 9pm       Multiple Vitamins-Minerals (PRESERVISION AREDS 2+MULTI VIT PO) Take 1 tablet by mouth At Bedtime 9pm       multivitamin, therapeutic with minerals (MULTI-VITAMIN) TABS tablet Take 1 tablet by mouth daily Cerovite Senior at 9pm       omeprazole (PRILOSEC) 20 MG DR capsule Take 20 mg by mouth 2 times daily 920am and 9m       senna-docusate (SENOKOT-S;PERICOLACE) 8.6-50 MG per tablet Take 2 tablets by mouth 2 times daily as needed for constipation 100 tablet      traMADol (ULTRAM) 50 MG tablet Take 0.5 tablets (25 mg) by mouth every 6 hours as needed for moderate pain (Patient taking differently: Take 25 mg by mouth At Bedtime ) 10 tablet 0     traMADol (ULTRAM) 50 MG tablet Take 0.5 tablets (25 mg) by mouth every 6 hours as needed for severe pain 1 tablet 0     triamcinolone acetonide 0.05 % OINT Externally apply topically 2 times daily (Patient taking differently: Externally apply topically 2 times daily Apply one applicator of ointment to both arms and back twice daily. Put on after the clobetasol cream for rash) 430 g 0       ALLERGIES     Allergies   Allergen Reactions     Penicillins Unknown       PAST MEDICAL HISTORY:  Past Medical History:   Diagnosis Date     Dementia      Parkinson's disease (H)        PAST SURGICAL HISTORY:  History reviewed. No pertinent surgical history.    FAMILY HISTORY:  History reviewed. No pertinent family history.    SOCIAL HISTORY:  Social History     Socioeconomic History     Marital status:      Spouse name: None     Number of children: None     Years of education: None     Highest education level: None   Social Needs     Financial resource strain: None     Food insecurity - worry: None     Food  "insecurity - inability: None     Transportation needs - medical: None     Transportation needs - non-medical: None   Occupational History     None   Tobacco Use     Smoking status: Former Smoker     Smokeless tobacco: Never Used   Substance and Sexual Activity     Alcohol use: No     Drug use: No     Sexual activity: None   Other Topics Concern     Parent/sibling w/ CABG, MI or angioplasty before 65F 55M? Not Asked   Social History Narrative     None       Review of Systems:  Skin:        Eyes:  Positive for glasses  ENT:  Positive for hearing loss  Respiratory:  Negative    Cardiovascular:    Positive for;palpitations;edema  Gastroenterology: not assessed    Genitourinary:  not assessed    Musculoskeletal:  not assessed    Neurologic:  Negative    Psychiatric:  not assessed    Heme/Lymph/Imm:  Positive for allergies  Endocrine:  Negative      Physical Exam:  Vitals: /58   Pulse 57   Ht 1.727 m (5' 8\")   Wt 68.9 kg (152 lb)   BMI 23.11 kg/m      Recent Lab Results:  LIPID RESULTS:  No results found for: CHOL, HDL, LDL, TRIG, CHOLHDLRATIO    LIVER ENZYME RESULTS:  Lab Results   Component Value Date    AST 22 01/27/2019    ALT 7 01/27/2019       CBC RESULTS:  Lab Results   Component Value Date    WBC 8.4 01/28/2019    RBC 3.54 (L) 01/28/2019    HGB 10.2 (L) 01/28/2019    HCT 32.2 (L) 01/28/2019    MCV 91 01/28/2019    MCH 28.8 01/28/2019    MCHC 31.7 01/28/2019    RDW 16.2 (H) 01/28/2019     01/28/2019       BMP RESULTS:  Lab Results   Component Value Date     01/29/2019    POTASSIUM 4.1 01/29/2019    CHLORIDE 110 (H) 01/29/2019    CO2 22 01/29/2019    ANIONGAP 8 01/29/2019    GLC 91 01/29/2019    BUN 34 (H) 01/29/2019    CR 1.43 (H) 01/29/2019    GFRESTIMATED 41 (L) 01/29/2019    GFRESTBLACK 47 (L) 01/29/2019    DON 8.2 (L) 01/29/2019        A1C RESULTS:  No results found for: A1C    INR RESULTS:  No results found for: INR        CC  Wyatt Carbajal MD, MD  [facility] PHYSICIAN SERVICES  [address] [place] [id number]

## 2019-02-18 ENCOUNTER — AMBULATORY - HEALTHEAST (OUTPATIENT)
Dept: OTHER | Facility: CLINIC | Age: 84
End: 2019-02-18

## 2019-02-18 ENCOUNTER — DOCUMENTATION ONLY (OUTPATIENT)
Dept: OTHER | Facility: CLINIC | Age: 84
End: 2019-02-18

## 2019-02-21 NOTE — TELEPHONE ENCOUNTER
Results of pt's Event Monitor received and reviewed by Dr. Saavedra. Appears no episodes of bradycardia. With that being said, per Dr. Saavedra's note on 2/13/19, pt should continue with current medical plan.     Called pt's POAChong. Reviewed Event Monitor results showing no episodes of bradycardia and that pt should continue on current plan/meds, call PRN for questions or concerns. Chong had no questions today. Treasure Reese RN on 2/21/2019 at 2:27 PM      Result Notes for Cardiac Event Monitor Adult Pediatric     Notes recorded by Corwin Saavedra MD on 2/20/2019 at 8:07 AM CST  Reviewed. Thanks. No episodes it seems.

## 2019-02-26 ENCOUNTER — RECORDS - HEALTHEAST (OUTPATIENT)
Dept: LAB | Facility: CLINIC | Age: 84
End: 2019-02-26

## 2019-02-26 LAB
ANION GAP SERPL CALCULATED.3IONS-SCNC: 10 MMOL/L (ref 5–18)
BUN SERPL-MCNC: 52 MG/DL (ref 8–28)
CALCIUM SERPL-MCNC: 9.2 MG/DL (ref 8.5–10.5)
CHLORIDE BLD-SCNC: 105 MMOL/L (ref 98–107)
CO2 SERPL-SCNC: 26 MMOL/L (ref 22–31)
CREAT SERPL-MCNC: 2.38 MG/DL (ref 0.7–1.3)
GFR SERPL CREATININE-BSD FRML MDRD: 25 ML/MIN/1.73M2
GLUCOSE BLD-MCNC: 86 MG/DL (ref 70–125)
POTASSIUM BLD-SCNC: 4 MMOL/L (ref 3.5–5)
SODIUM SERPL-SCNC: 141 MMOL/L (ref 136–145)
T4 FREE SERPL-MCNC: 0.9 NG/DL (ref 0.7–1.8)
TSH SERPL DL<=0.005 MIU/L-ACNC: 3.28 UIU/ML (ref 0.3–5)

## 2020-02-29 NOTE — PROGRESS NOTES
PRIMARY DIAGNOSIS: ACUTE PAIN  OUTPATIENT/OBSERVATION GOALS TO BE MET BEFORE DISCHARGE:  1. Pain Status: NO.     2. Return to near baseline physical activity: Yes     3. Cleared for discharge by consultants (if involved): No     Discharge Planner Nurse   Safe discharge environment identified: Yes  Barriers to discharge: No       No difficulties

## 2021-10-29 NOTE — IP AVS SNAPSHOT
` `     [place] [address] [facility]: [phone]                 INTERAGENCY TRANSFER FORM - NOTES (H&P, Discharge Summary, Consults, Procedures, Therapies)   2017                    Hospital Admission Date: 2017  CARLOS POLK   : 10/31/1921  Sex: Male        Patient PCP Information     Provider PCP Type    Baljeet Londono MD General         History & Physicals      H&P by Juan Luis Hall DO at 2017  3:48 PM     Author:  Juan Luis Hall DO Service:  Hospitalist Author Type:  Physician    Filed:  2017  4:50 PM Date of Service:  2017  3:48 PM Creation Time:  2017  3:48 PM    Status:  Signed :  Juan Luis Hall DO (Physician)         [facility]    History and Physical  Hospitalist       Date of Admission:  2017    Assessment & Plan   Carlos Polk is a 89 year old male with a history of Parkinson's Disease and dementia who presented with generalized weakness the past 2-3 days and was found to be influenza A positive    Influenza A  Patient was influenza A positive in the ED.  This is the likely cause of the patient's acute symptoms including his generalized weakness and cough.  His vitals were normal in the ED and he was satting well on room air.  A CXR did not show any signs of infiltrate  -[JH1.1] Admission under obs status  - Tamiflu 30 mg BID given CrCl  - Tylenol PRN   - PT/OT/Social work consults to help with placement[JH1.2]    Parkinson's Disease  Dementia  Patient has no formal diagnosis for dementia or more specifically Lewy Body Dementia.[JH1.1]  - Will continue PTA Sinemet     Paroxysmal SVT   [facility] (notes in Epic).  Well controlled on Digoxin and Diltiazem.  Currently in NSR on telemetry and EKG from today showed sinus bradycardia.   - Will continue Digoxin and Diltiazem[JH1.2]     HTN[JH1.1]   Well controlled at this time  - Continue PTA  HCTZ[JH1.2]     DVT Prophylaxis:[JH1.1] Pneumatic Compression Devices[JH1.2]  Code Status:[JH1.1] Unknown, patient has advanced directives per his nephew but he does not know what they are.  He will try to bring the paperwork in later today.[JH1.2]     Disposition: Expected discharge in[JH1.1] 1-2[JH1.2] days once[JH1.1] evaluated by PT/OT and possible escalation in outpatient cares can be arranged[JH1.2].    Juan Luis Hall, DO    Primary Care Physician   Baljeet Londono    Chief Complaint   Generalized weakness    History is obtained from the patient and his nephew.  His history is somewhat limited given his dementia    History of Present Illness   Loco Hawkins is a 89 year old male with a history of parkinsons disease who presents with[JH1.1] generalized weakness for the past 2-3 days.  Patient has also had a non-productive cough, subjective shortness of breath, chills and body aches, most prominent in left leg).  He has not had any reported fevers, chest pain, headaches, sore throat, leg swelling.  Of note, the patient's wife has been ill the past couple of weeks with URI symptoms.  It is unknown if the patient received his influenza vaccination this year.     In the ED the patient was found to be influenza A positive and was started on Tamiflu.[JH1.2]     Past Medical History[JH1.1]    I have reviewed this patient's medical history and updated it with pertinent information if needed.[JH1.2]   Past Medical History:   Diagnosis Date     Dementia      Parkinson's disease (H)[JH1.3]    Paroxysmal SVT  HTN[JH1.2]     Past Surgical History[JH1.1]   I have reviewed this patient's surgical history and updated it with pertinent information if needed.  Unable to obtain due to dementia[JH1.2]    Prior to Admission Medications   Prior to Admission Medications   Prescriptions Last Dose Informant Patient Reported? Taking?   ASPIRIN EC PO 12/17/2017 at am  Yes Yes   Sig: Take 81 mg by mouth daily   DIGOXIN PO 12/15/2017   Yes Yes   Sig: Take 125 mcg by mouth five times a week Monday-Friday   HYDROCHLOROTHIAZIDE PO 12/17/2017 at am  Yes Yes   Sig: Take 25 mg by mouth daily   carbidopa-levodopa (SINEMET)  MG per tablet   Yes Yes   Sig: Take 2 tablets by mouth   diltiazem (CARTIA XT) 180 MG 24 hr capsule 12/17/2017 at am  Yes Yes   Sig: Take 180 mg by mouth daily   dorzolamide-timolol (COSOPT) 2-0.5 % ophthalmic solution 12/17/2017 at am x 1 dose  Yes Yes   Sig: Place 1 drop into both eyes 2 times daily   latanoprost (XALATAN) 0.005 % ophthalmic solution 12/16/2017 at pm  Yes Yes   Sig: Place 1 drop into both eyes At Bedtime   multivitamin, therapeutic with minerals (MULTI-VITAMIN) TABS tablet 12/17/2017 at am  Yes Yes   Sig: Take 1 tablet by mouth daily      Facility-Administered Medications: None     Allergies   No Known Allergies    Social History[JH1.1]   I have reviewed this patient's social history and updated it with pertinent information if needed.[JH1.2] He has never used smokeless tobacco. He does not drink alcohol or use illicit drugs.[JH1.4]  Currently lives in an assisted living with his wife facility but does not have weekend care.[JH1.2]    Family History[JH1.1]   I have reviewed this patient's family history and updated it with pertinent information if needed.   Unable to obtain due to dementia[JH1.2]     Review of Systems[JH1.1]   The 10 point Review of Systems is negative other than noted in the HPI[JH1.2]    Physical Exam   Temp: 98.1  F (36.7  C) Temp src: Oral BP: 144/61   Heart Rate: 68 Resp: 14 SpO2: 99 % O2 Device: None (Room air)    Vital Signs with Ranges  Temp:  [98.1  F (36.7  C)] 98.1  F (36.7  C)  Heart Rate:  [68] 68  Resp:  [14] 14  BP: (131-144)/(51-61) 144/61  SpO2:  [99 %] 99 %  135 lbs 0 oz    Constitutional: Awake, alert, cooperative, no apparent distress.  Eyes: Conjunctiva and pupils examined and normal.  HEENT: Moist mucous membranes, normal dentition.  Respiratory:[JH1.1] Poor respiratory  effort but c[JH1.2]lear to auscultation bilaterally, no crackles or wheezing.  Cardiovascular: Regular rate and rhythm, normal S1 and S2, and no murmur noted.  GI: Soft, non-distended, non-tender, normal bowel sounds.  Lymph/Hematologic: No a[JH1.1]bnormal bruising[JH1.2]  Skin: No rashes, no cyanosis, no edema.  Musculoskeletal: No joint swelling, erythema or tenderness.  Neurologic:[JH1.1] 4/5 strength to all 4 extremities[JH1.2]   Psychiatric: Alert, no obvious anxiety or depression.    Data   Data reviewed today:  I personally reviewed[JH1.1]   CXR:  No obvious infiltrates.  No cardiomegaly.  No pleural effusions  EKG:  Sinus bradycardia.  Artifact present.  No obvious ST elevations.[JH1.2]    Recent Labs  Lab 12/17/17  1420   WBC 6.5   HGB 12.5*   MCV 96         POTASSIUM 3.8   CHLORIDE 101   CO2 29   BUN 47*   CR 1.48*   ANIONGAP 6   DON 8.6   GLC 83   ALBUMIN 3.4   PROTTOTAL 8.2   BILITOTAL 0.7   ALKPHOS 46   ALT 10   AST 46*       Imaging:  Recent Results (from the past 24 hour(s))   XR Chest 2 Views    Narrative    CHEST TWO VIEWS   12/17/2017 2:43 PM     HISTORY: Shortness of breath.     COMPARISON: 2/8/2017.    FINDINGS: Negative chest. No interval change.      Impression    IMPRESSION: Negative.[JH1.1]         Revision History        User Key Date/Time User Provider Type Action    > JH1.4 12/17/2017  4:50 PM Juan Luis Hall DO Physician Sign     JH1.3 12/17/2017  4:18 PM Juan Luis Hall DO Physician      JH1.2 12/17/2017  4:11 PM Juan Luis Hall DO Physician      JH1.1 12/17/2017  3:48 PM Juan Luis Hall DO Physician                      Discharge Summaries      Discharge Summaries by Stef Pineda DO at 12/20/2017  8:32 AM     Author:  Stef Pineda DO Service:  Hospitalist Author Type:  Physician    Filed:  12/20/2017  8:32 AM Date of Service:  12/20/2017  8:32 AM Creation Time:  12/20/2017  8:28 AM    Status:  Signed :   Stef Pineda DO (Physician)         [facility]    Discharge Summary  Hospitalist    Date of Admission:  12/17/2017  Date of Discharge:[ES1.1]  12/20/2017[ES1.2]  Discharging Provider: Stef Pineda DO    Discharge Diagnoses   Influenza a  Right metatarsalgia  parkinsons disease  Dementia  Paroxysmal SVT  essential htn      History of Present Illness   Loco Hawkins is an 89 year old male who presented with body aches and generalized deconditioning.    Hospital Course   Loco Hawkins was admitted on 12/17/2017.  The following problems were addressed during his hospitalization:  Summary:   Influenza A  Patient was influenza A positive in the ED.  This is the likely cause of the patient's acute symptoms including his generalized weakness and cough.  His vitals were normal in the ED and he was satting well on room air.  A CXR did not show any signs of infiltrate  - Tamiflu renally dosed  - Tylenol PRN   - PT/OT/Social work consults to help with placement  - placement in TCU per therapies.      Right metatarsalgia: longstanding issue, per wife has seen > 5 doctors for this. Has chronic achilles contracture, [facility]   - fu with outpatient ortho  - PT      Parkinson's Disease  Dementia  Patient has no formal diagnosis for dementia or more specifically Lewy Body Dementia.  - Will continue PTA Sinemet       Paroxysmal SVT   [facility] (notes in Epic).  Well controlled on Digoxin and Diltiazem.  Currently in NSR on telemetry and EKG from today showed sinus bradycardia.   - Will continue Digoxin and Diltiazem       HTN   Well controlled at this time  - can d/c his HCTZ at this point, his renal function will not handle    Active Problems:    Influenza A    Influenza      Stef Pineda DO    Significant Results and Procedures   Influenza high  Foot xr    Pending Results   These results will be followed  up by none  Unresulted Labs Ordered in the Past 30 Days of this Admission     No orders found from 10/18/2017 to 12/18/2017.          Code Status   Full Code. This was verified from the advance directive written and from his nephew. Will benefit from continued education.        Primary Care Physician   Baljeet Londono    Physical Exam   Temp: 98.5  F (36.9  C) Temp src: Axillary BP: 111/51 Pulse: 65 Heart Rate: 62 Resp: 16 SpO2: 97 % O2 Device: None (Room air)    Vitals:    12/17/17 1242   Weight: 61.2 kg (135 lb)     Vital Signs with Ranges  Temp:  [98.1  F (36.7  C)-98.5  F (36.9  C)] 98.5  F (36.9  C)  Pulse:  [65] 65  Heart Rate:  [62-65] 62  Resp:  [15-16] 16  BP: (109-111)/(51-57) 111/51  SpO2:  [96 %-98 %] 97 %  I/O last 3 completed shifts:  In: 590 [P.O.:590]  Out: 50 [Urine:50]    Constitutional: resting comfortably, no distress or sob  Eyes: Conjunctiva and pupils examined and normal.  HEENT: Moist mucous membranes, normal dentition.  Respiratory: Clear to auscultation bilaterally, no crackles or wheezing.  Cardiovascular: Regular rate and rhythm, normal S1 and S2, and no murmur noted.  GI: Soft, non-distended, non-tender, normal bowel sounds.  Lymph/Hematologic: No anterior cervical or supraclavicular adenopathy.  Skin: No rashes, no cyanosis, no edema.  Musculoskeletal: right sided achilles contracture, chronic  Neurologic: Cranial nerves 2-12 intact, normal strength and sensation.  Psychiatric: Alert, oriented to person only this AM    Discharge Disposition   Discharged to tcu  Condition at discharge: Stable    Consultations This Hospital Stay   PHYSICAL THERAPY ADULT IP CONSULT  OCCUPATIONAL THERAPY ADULT IP CONSULT  SOCIAL WORK IP CONSULT  WOUND OSTOMY CONTINENCE NURSE  IP CONSULT  CASE MANAGEMENT ADULT IP CONSULT  NUTRITION SERVICES ADULT IP CONSULT  PHYSICAL THERAPY ADULT IP CONSULT  OCCUPATIONAL THERAPY ADULT IP CONSULT    Time Spent on this Encounter   Stef MEZA, personally saw the  patient today and spent less than or equal to 30 minutes discharging this patient.    Discharge Orders     General info for SNF   Length of Stay Estimate: Short Term Care: Estimated # of Days <30  Condition at Discharge: Stable  Level of care:skilled   Rehabilitation Potential: Fair  Admission H&P remains valid and up-to-date: Yes  Recent Chemotherapy: N/A  Use Nursing Home Standing Orders: Yes     Mantoux instructions   Give two-step Mantoux (PPD) Per Facility Policy Yes     Reason for your hospital stay   Influenza causing generalized weakness     Activity - Up with nursing assistance     Additional Discharge Instructions   Please follow up with your orthopedic surgeon for further treatment of the right foot pain  Will need follow up bmp in 3 days     Activity - Up with nursing assistance     Follow Up and recommended labs and tests   Follow up with [facility] physician.  The following labs/tests are recommended: bmp within 3 days.     Full Code     Nutrition Services Adult IP Consult   Reason:  Elderly patient     Physical Therapy Adult Consult   Evaluate and treat as clinically indicated.    Reason:  Generalized weaknees     Occupational Therapy Adult Consult   Evaluate and treat as clinically indicated.    Reason:  Generalized weakness     Fall precautions     Advance Diet as Tolerated   Follow this diet upon discharge: Orders Placed This Encounter     Regular Diet Adult       Discharge Medications   Current Discharge Medication List      START taking these medications    Details   oseltamivir (TAMIFLU) 30 MG capsule Take 1 capsule (30 mg) by mouth daily for 3 days  Qty: 2 capsule, Refills: 0    Associated Diagnoses: Influenza A      senna-docusate (SENOKOT-S;PERICOLACE) 8.6-50 MG per tablet Take 2 tablets by mouth 2 times daily as needed for constipation  Qty: 100 tablet    Associated Diagnoses: Generalized muscle weakness      potassium chloride (KLOR-CON) 20 MEQ Packet 20 mEq by Oral or Feeding Tube route  daily for 3 days  Qty: 3 packet, Refills: 0    Associated Diagnoses: Hypokalemia         CONTINUE these medications which have NOT CHANGED    Details   ASPIRIN EC PO Take 81 mg by mouth daily      multivitamin, therapeutic with minerals (MULTI-VITAMIN) TABS tablet Take 1 tablet by mouth daily      dorzolamide-timolol (COSOPT) 2-0.5 % ophthalmic solution Place 1 drop into both eyes 2 times daily      latanoprost (XALATAN) 0.005 % ophthalmic solution Place 1 drop into both eyes At Bedtime      diltiazem (CARTIA XT) 180 MG 24 hr capsule Take 180 mg by mouth daily      DIGOXIN PO Take 125 mcg by mouth five times a week Monday-Friday      carbidopa-levodopa (SINEMET)  MG per tablet Take 2 tablets by mouth 3 times daily       VITAMIN D, CHOLECALCIFEROL, PO Take 1 tablet by mouth 2 times daily (OTC: Nurse did not know dose)      DOCUSATE SODIUM PO Take 100 mg by mouth 2 times daily         STOP taking these medications       HYDROCHLOROTHIAZIDE PO Comments:   Reason for Stopping:             Allergies   No Known Allergies  Data   Most Recent 3 CBC's:[ES1.1]  Recent Labs   Lab Test  12/19/17   0859  12/17/17   1420  02/08/17   1230   WBC  5.2  6.5  8.5   HGB  11.8*  12.5*  12.5*   MCV  95  96  97   PLT  163  199  217[ES1.2]      Most Recent 3 BMP's:[ES1.1]  Recent Labs   Lab Test  12/19/17   1725  12/19/17   0859  12/17/17   1420  02/08/17   1230   NA   --   138  136  139   POTASSIUM  4.4  3.2*  3.8  3.1*   CHLORIDE   --   102  101  105   CO2   --   28  29  23   BUN   --   41*  47*  37*   CR   --   1.50*  1.48*  1.52*   ANIONGAP   --   8  6  11   DON   --   8.0*  8.6  8.7   GLC   --   90  83  122*[ES1.2]     Most Recent 2 LFT's:[ES1.1]  Recent Labs   Lab Test  12/17/17   1420   AST  46*   ALT  10   ALKPHOS  46   BILITOTAL  0.7[ES1.2]     Most Recent INR's and Anticoagulation Dosing History:  Anticoagulation Dose History     There is no flowsheet data to display.        Most Recent 3 Troponin's:[ES1.1]  Recent Labs    Lab Test  02/08/17   1230   TROPI  <0.015  The 99th percentile for upper reference range is 0.045 ug/L.  Troponin values in   the range of 0.045 - 0.120 ug/L may be associated with risks of adverse   clinical events.[ES1.2]       Most Recent Cholesterol Panel:[ES1.1]No lab results found.[ES1.2]  Most Recent 6 Bacteria Isolates From Any Culture (See EPIC Reports for Culture Details):[ES1.1]No lab results found.[ES1.2]  Most Recent TSH, T4 and A1c Labs:[ES1.1]No lab results found.  Results for orders placed or performed during the hospital encounter of 12/17/17   XR Chest 2 Views    Narrative    CHEST TWO VIEWS   12/17/2017 2:43 PM     HISTORY: Shortness of breath.     COMPARISON: 2/8/2017.    FINDINGS: Negative chest. No interval change.      Impression    IMPRESSION: Negative.    AMELIA NEWELL MD   XR Foot Right G/E 3 Views    Narrative    XR FOOT RT G/E 3 VW 12/18/2017 5:54 PM    COMPARISON: None.    HISTORY: Foot pain.      Impression    IMPRESSION: No fractures are seen in the right foot. Plantar calcaneal  spur and Achilles enthesopathy are seen.    LAUREN BARROS MD[ES1.2]        Revision History        User Key Date/Time User Provider Type Action    > ES1.2 12/20/2017  8:32 AM Stef Pineda,  Physician Sign     ES1.1 12/20/2017  8:28 AM Stef Pineda, DO Physician                   Consult Notes     No notes of this type exist for this encounter.         Progress Notes - Physician (Notes from 12/17/17 through 12/20/17)      Progress Notes by Janet Lopez LICSW at 12/19/2017  4:26 PM     Author:  Janet Lopez LICSW Service:  (none) Author Type:      Filed:  12/19/2017  4:31 PM Date of Service:  12/19/2017  4:26 PM Creation Time:  12/19/2017  4:26 PM    Status:  Signed :  Janet Lopez LICSW ()         SW  D:  Per care coordinator, wife would like patient to admit to [facility] in a private room.  She is aware the stay will be private  pay however the nephew handles their finances.  Referral made to [facility].  Due to patient's Influenza A dx., prior to admitting patient, he would need to be symptom free and have had three days of  BID doses of Tamiflu.   It appears patient had one dose of 12/17 and BID since so possibly could d/c on 12/20.    [facility]  Have not had a chance to speak with nephew yet.    Writer will also check with other TCU's however this is generally the community standard for TCU's, some TCU's require completion of Tamiflu before admission.[KH1.1]     Revision History        User Key Date/Time User Provider Type Action    > KH1.1 12/19/2017  4:31 PM Janet Lopez [facility]  Sign            Progress Notes by Stef Pineda DO at 12/19/2017  2:08 PM     Author:  Stef Pineda DO Service:  Hospitalist Author Type:  Physician    Filed:  12/19/2017  2:27 PM Date of Service:  12/19/2017  2:08 PM Creation Time:  12/19/2017  2:08 PM    Status:  Addendum :  Stef Pineda DO (Physician)         [facility]    Hospitalist Progress Note    Assessment & Plan   Loco Hawkins is a 89 year old male with parkinson's and probable dementia who was admitted on 12/17/2017 with generalized weakness, cough, and body aches. He was found to have influenza infection.    Summary:   Influenza A  Patient was influenza A positive in the ED.  This is the likely cause of the patient's acute symptoms including his generalized weakness and cough.  His vitals were normal in the ED and he was satting well on room air.  A CXR did not show any signs of infiltrate  - Tamiflu renally dosed  - Tylenol PRN   - PT/OT/Social work consults to help with placement  - placement in TCU per therapies. Can discharge when facility available    Right metatarsalgia: longstanding issue, per wife has seen > 5 doctors for this. Has chronic achilles contracture, follow with [facility] as outpatient.   - fu with outpatient ortho  - PT     Parkinson's Disease  Dementia  Patient has no formal diagnosis for dementia or more specifically Lewy Body Dementia.  - Will continue PTA Sinemet      Paroxysmal SVT   [facility] (notes in Epic).  Well controlled on Digoxin and Diltiazem.  Currently in NSR on telemetry and EKG from today showed sinus bradycardia.   - Will continue Digoxin and Diltiazem      HTN   Well controlled at this time  - can d/c his HCTZ at this point, his renal function will not handle[ES1.1]    DVT PPX: scds[ES1.2]      Stef Pineda, DO  Text Page  (7am to 6pm)  Interval History   States he is feeling a little better. He wants to get up and moving    -Data reviewed today: I reviewed all new labs and imaging results over the last 24 hours.     Physical Exam   Temp: 98  F (36.7  C) Temp src: Oral BP: 111/56 Pulse: 64 Heart Rate: 65 Resp: 16 SpO2: 96 % O2 Device: None (Room air)    Vitals:    12/17/17 1242   Weight: 61.2 kg (135 lb)     Vital Signs with Ranges  Temp:  [98  F (36.7  C)-98.2  F (36.8  C)] 98  F (36.7  C)  Pulse:  [63-64] 64  Heart Rate:  [61-65] 65  Resp:  [16-18] 16  BP: ()/(49-56) 111/56  SpO2:  [96 %-98 %] 96 %  I/O last 3 completed shifts:  In: -   Out: 250 [Urine:250]    Constitutional: Awake, alert, cooperative, no apparent distress  Respiratory: Clear to auscultation bilaterally, no crackles or wheezing  Cardiovascular: Regular rate and rhythm, normal S1 and S2, and no murmur noted  GI: Normal bowel sounds, soft, non-distended, non-tender  Skin/Integumen: No rashes, no cyanosis, no edema  Neuro: alert and oriented x1. Moving all extremities  Extremities: right foot with DP and PT pulses dopplered today. No signs of erythema, swelling, or duskiness. Minimal point tenderness on palpation of dorsal metatarsals.    Medications        influenza Vac Split High-Dose  0.5 mL Intramuscular Prior to discharge      aspirin EC EC tablet 81 mg  81 mg Oral Daily     latanoprost  1 drop Both Eyes At Bedtime     oseltamivir  30 mg Oral Daily     carbidopa-levodopa  2 tablet Oral TID     digoxin (LANOXIN) tablet 125 mcg  125 mcg Oral Once per day on Mon Tue Wed Thu Fri     diltiazem  180 mg Oral Daily     docusate sodium (COLACE) capsule 100 mg  100 mg Oral BID     cholecalciferol  1,000 Units Oral BID       Data     Recent Labs  Lab 12/19/17  0859 12/17/17  1420   WBC 5.2 6.5   HGB 11.8* 12.5*   MCV 95 96    199    136   POTASSIUM 3.2* 3.8   CHLORIDE 102 101   CO2 28 29   BUN 41* 47*   CR 1.50* 1.48*   ANIONGAP 8 6   DON 8.0* 8.6   GLC 90 83   ALBUMIN  --  3.4   PROTTOTAL  --  8.2   BILITOTAL  --  0.7   ALKPHOS  --  46   ALT  --  10   AST  --  46*       Imaging:   Recent Results (from the past 24 hour(s))   XR Foot Right G/E 3 Views    Narrative    XR FOOT RT G/E 3 VW 12/18/2017 5:54 PM    COMPARISON: None.    HISTORY: Foot pain.      Impression    IMPRESSION: No fractures are seen in the right foot. Plantar calcaneal  spur and Achilles enthesopathy are seen.    LAUREN BARROS MD[ES1.1]        Revision History        User Key Date/Time User Provider Type Action    > ES1.2 12/19/2017  2:27 PM Stef Pineda,  Physician Addend     ES1.1 12/19/2017  2:12 PM Stef Pineda,  Physician Sign            Progress Notes by Janet Lopez LICSW at 12/19/2017 12:14 PM     Author:  Janet Lopez LICSW Service:  (none) Author Type:      Filed:  12/19/2017 12:17 PM Date of Service:  12/19/2017 12:14 PM Creation Time:  12/19/2017 12:14 PM    Status:  Signed :  Janet Lopez LICSW ()         SW  D:  Per care coordinator, wife's preference for TCU  is a private room at [facility].  [facility] guidelines for Influenza is three days of Tamiflu and symptom free.  Writer zoya this will be similar at other TCU's.  Patient started on Tamiflu on 1218 thus do not expect d/c  until late 12/20 or morning of 12/20.    Care Coordinator updated.[KH1.1]     Revision History        User Key Date/Time User Provider Type Action    > KH1.1 12/19/2017 12:17 PM Janet Lopez [facility]  Sign            Progress Notes by Bryanna Noel PT at 12/18/2017  3:49 PM     Author:  Bryanna Noel PT Service:  (none) Author Type:  Physical Therapist    Filed:  12/18/2017  3:49 PM Date of Service:  12/18/2017  3:49 PM Creation Time:  12/18/2017  3:49 PM    Status:  Signed :  Bryanna Noel PT (Physical Therapist)            12/18/17 1338   Quick Adds   Type of Visit Initial PT Evaluation   Living Environment   Lives With spouse  (Gissell)   Living Arrangements assisted living   Number of Stairs to Enter Home 0   Number of Stairs Within Home 0   Transportation Available family or friend will provide   Living Environment Comment Pt and his wife receive assist 3 times/week for household tasks.    Self-Care   Usual Activity Tolerance good   Current Activity Tolerance poor   Regular Exercise no   Equipment Currently Used at Home walker, rolling;wheelchair, manual   Activity/Exercise/Self-Care Comment Pt reports utilizing wheelchair for the past 3 weeks d/t R ankle pain.    Functional Level Prior   Ambulation 1-->assistive equipment   Transferring 1-->assistive equipment   Fall history within last six months no   General Information   Onset of Illness/Injury or Date of Surgery - Date 12/17/17   Referring Physician Juan Luis Hall,    Patient/Family Goals Statement None stated.    Pertinent History of Current Problem (include personal factors and/or comorbidities that impact the POC) 89 y/o male admitted under observation status with influenza A. PMH including Parkinson's and probable dementia.    Precautions/Limitations fall precautions  (Droplet precautions)   General Observations Pt in supine upon arrival of therapist.    General Info Comments Ambulate with assist.   "  Cognitive Status Examination   Orientation person;place  (\"December 2018\")   Level of Consciousness alert   Follows Commands and Answers Questions 75% of the time;unable to follow multi-step instructions   Personal Safety and Judgment impaired   Pain Assessment   Patient Currently in Pain (R ankle pain: 8/10 )   Integumentary/Edema   Integumentary/Edema Comments Noted R ankle edema.    Posture    Posture Comments Noted forward head and shoulder posture upon sitting EOB and standing at FWW.    Range of Motion (ROM)   ROM Comment B LEs WFL, limited R ankle ROM d/t pain.    Strength   Strength Comments Not formally assessed. Noted generalized B LE weakness, at least 3/5 grossly in B LEs reflected with transfers.    Bed Mobility   Bed Mobility Comments Supine-sit requiring Petra of 2.    Transfer Skills   Transfer Comments Sit <> stand with FWW and modA of 2.    Gait   Gait Comments Pt unable to ambulate.    Balance   Balance Comments Noted fair sitting balance and poor standing balance with noted posterior lean.    Sensory Examination   Sensory Perception Comments Pt denied numbness/tingling in B LEs.    General Therapy Interventions   Planned Therapy Interventions balance training;bed mobility training;gait training;ROM;strengthening;transfer training   Clinical Impression   Criteria for Skilled Therapeutic Intervention yes, treatment indicated   PT Diagnosis Difficulty with functional mobility.    Influenced by the following impairments Generalized weakness, Decreased activity tolerance, Pain   Functional limitations due to impairments Limited functional mobility requiring assist of 2 and mechanical lift.    Clinical Presentation Evolving/Changing   Clinical Presentation Rationale Based on PMH, current presentation, and social support.    Clinical Decision Making (Complexity) Low complexity   Therapy Frequency` 3 times/week   Predicted Duration of Therapy Intervention (days/wks) 5 days   Anticipated Discharge " "Disposition Transitional Care Facility   Risk & Benefits of therapy have been explained Yes   Patient, Family & other staff in agreement with plan of care Yes   [facility] AM-PAC  \"6 Clicks\" V.2 Basic Mobility Inpatient Short Form   1. Turning from your back to your side while in a flat bed without using bedrails? 3 - A Little   2. Moving from lying on your back to sitting on the side of a flat bed without using bedrails? 2 - A Lot   3. Moving to and from a bed to a chair (including a wheelchair)? 1 - Total   4. Standing up from a chair using your arms (e.g., wheelchair, or bedside chair)? 2 - A Lot   5. To walk in hospital room? 1 - Total   6. Climbing 3-5 steps with a railing? 1 - Total   Basic Mobility Raw Score (Score out of 24.Lower scores equate to lower levels of function) 10   Total Evaluation Time   Total Evaluation Time (Minutes) 15[JH1.1]        Revision History        User Key Date/Time User Provider Type Action    > JH1.1 12/18/2017  3:49 PM Bryanna Noel, PT Physical Therapist Sign            Progress Notes by Wili Bowling RN at 12/18/2017  2:38 PM     Author:  Wili Bowling RN Service:  Care Coordinator Author Type:      Filed:  12/18/2017  2:54 PM Date of Service:  12/18/2017  2:38 PM Creation Time:  12/18/2017  2:38 PM    Status:  Signed :  Wili Bowling RN ()         Spoke with patient's spouse  regarding observation status and discharge plans. Patient lives with spouse at [facility] in the Independent Living place. Per RALEIGH Young from [facility] patient was just started on cares from their facility. Patient was receiving assistance with AM and PM cares and medication management.Hannah states patient's nephew who is the POA requesting TCU at discharge so that patient will receive his therapies.[BV1.1]      Revision History        User Key Date/Time User Provider Type Action    > BV1.1 12/18/2017  2:54 PM Wili Bowling RN Case Manager Sign       " "     Progress Notes by Dodie Burns RN at 12/18/2017 12:19 PM     Author:  Dodie Burns RN Service:  [facility] Nurse Author Type:  Registered Nurse    Filed:  12/18/2017 12:23 PM Date of Service:  12/18/2017 12:19 PM Creation Time:  12/18/2017 12:19 PM    Status:  Signed :  Dodie Burns RN (Registered Nurse)         WOCNurse assessment of coccyx  D:   Epidermis across coccyx, gluteal cleft, buttocks intact without erythema.  No pain with assessment  I:  Assessed area as noted above.  Discussed plan of care with pt and RN.  A:  No evidence of pressure injury to coccyx, gluteal cleft, perineal areas.   Will use PIP measures including Mepilex Sacral dressing prn.  P:   Plan of care for coccyx area  1. Clean intact skin with gentle soap and water, dry and dry again.  2. Paint with No Sting Skin Prep (#[id number]) and allow to dry thoroughly  3. Press a Mepilex  Sacral Dressing (PS#[id number])  to the area, making sure to conform nicely to skin curvatures  4. Time and date dressing change and ramy with a \"P\" for prevention.  5. Reposition pt every 1 to 2 hours when in bed and hourly when up to the chair to relieve pressure and promote perfusion to tissue  NOTE** make sure to continue to assess under the Mepilex Dressing BID and document findings.  If epidermis  opens notify CWOCN's and change dressing to \"T\" for treatment    Pressure INJURY Prevention Measures (PIP):  If pt is refusing to turn or reposition they must be educated on the  potential injury from not off loading pressure.  Then this \"educated refusal\" needs to be documented as an \"educated refusal to turn/ reposition\" and document if alert, etc.    1. Repositioning:  Pt must be repositioned for good skin health.  If pt refusing or this is not being done then the charge nurse, nurse manager and md need to be notified to help intervene and to know that there is an issue    Bed:  Reposition pt MINIMALLY every 1-2 hours in bed to relieve pressure and " "promote perfusion to tissue. Also try to position to get airflow to pt s backside, etc. If necessary consider use of Fluidized Positioner Pads ((932948 small/ 404185 med/ [id number] large) to help maintain pt in good offloading-position.               Use pillows in the lower back and upper thighs to support postioning but keep pillows off butt to minimize any pressure.    Chair:  When up to chair pt should not sit for longer than one hour total before either standing or returning to bed for at least 10 minutes, again to relieve pressure and promote perfusion to tissue.     o Pt should also sit on a chair cushion (#[id number]) when up to the chair.  o Do NOT use a donut to sit on.  This concentrates pressure in a smaller area and creates a higher potential for injury where the cushion is.   o When pt returns to bed he/she should be positioned on side  o Do not sit on a Z-Flow Pad.  This is not a chair cushion, it is for positioning  If pt is refusing to turn or reposition they must be educated on the  potential injury from not off loading pressure.  Then this \"educated refusal\" needs to be documented as an \"educated refusal to turn/ reposition\" and document if alert, etc.  2.  Patient's skin must be kept clean and dry- the areas are only clean when you see the base of the skin fold, especially the perineal area.  Moist, macerated tissue is more susceptible to injury.           Follow Incontinence Protocol found in [employer].  Dust with baby powder BID to help with chaffing, decrease warmth from friction and increase comfort.         NO BRIEFS WITH CATHETERS  3.  Follow the bed algorithm to consider a specialty mattress  4.  If able keep head of bed below 30 degrees.   5.  Follow Eb Risk recommendations  6.  Be aware of the bony prominences!    Elevate heels at all times, consider using heel lift boots, Silverio or Rooke    Apply skin prep to bony prominences such as elbows, heels, hips- and consider wearing long sleeves " and/or pants at al times  7.  Remember to perform full skin inspection 2 x / day- unless ordered NOT to order an area skin must be assessed.  If not able to do a full skin inspection then document full inspection along with the exception of what was not assessed.     Face to face time:  20minutes  Follow weekly and prn[ZP1.1]     Revision History        User Key Date/Time User Provider Type Action    > ZP1.1 12/18/2017 12:23 PM Dodie Burns RN Registered Nurse Sign            Progress Notes by Stef Pineda DO at 12/18/2017 10:00 AM     Author:  Stef Pineda DO Service:  Hospitalist Author Type:  Physician    Filed:  12/18/2017 10:06 AM Date of Service:  12/18/2017 10:00 AM Creation Time:  12/18/2017 10:00 AM    Status:  Signed :  Stef Pineda DO (Physician)         [facility]    Hospitalist Progress Note    Assessment & Plan   Loco Hawkins is a 89 year old male with parkinson's and probable dementia who was admitted on 12/17/2017 with generalized weakness, cough, and body aches. He was found to have influenza infection.    Summary:   Influenza A  Patient was influenza A positive in the ED.  This is the likely cause of the patient's acute symptoms including his generalized weakness and cough.  His vitals were normal in the ED and he was satting well on room air.  A CXR did not show any signs of infiltrate  - Admission under obs status  - Tamiflu 30 mg BID given CrCl  - Tylenol PRN   - PT/OT/Social work consults to help with placement  - will monitor for postviral pna     Parkinson's Disease  Dementia  Patient has no formal diagnosis for dementia or more specifically Lewy Body Dementia.  - Will continue PTA Sinemet      Paroxysmal SVT   [facility] (notes in Epic).  Well controlled on Digoxin and Diltiazem.  Currently in NSR on telemetry and EKG from today showed sinus bradycardia.   - Will continue Digoxin  and Diltiazem      HTN   Well controlled at this time  - Continue PTA HCTZ      Right foot pain:  - will check an xr, but low suspicion for acute fracture    Stef Pineda, DO  Text Page  (7am to 6pm)  Interval History   States he is feeling a little better, but does not recall saying that after. Eating well    -Data reviewed today: I reviewed all new labs and imaging results over the last 24 hours. I personally reviewed the EKG tracing showing nsr, and the chest x-ray image(s) showing no infiltrate.    Physical Exam   Temp: 97.7  F (36.5  C) Temp src: Oral BP: 96/40 Pulse: 56 Heart Rate: 59 Resp: 18 SpO2: 98 % O2 Device: None (Room air)    Vitals:    12/17/17 1242   Weight: 61.2 kg (135 lb)     Vital Signs with Ranges  Temp:  [97.5  F (36.4  C)-98.1  F (36.7  C)] 97.7  F (36.5  C)  Pulse:  [56-60] 56  Heart Rate:  [59-68] 59  Resp:  [14-18] 18  BP: ()/(40-61) 96/40  SpO2:  [98 %-99 %] 98 %  I/O last 3 completed shifts:  In: -   Out: 150 [Urine:150]    Constitutional: Awake, alert, cooperative, no apparent distress  Respiratory: Clear to auscultation bilaterally, no crackles or wheezing  Cardiovascular: Regular rate and rhythm, normal S1 and S2, and no murmur noted  GI: Normal bowel sounds, soft, non-distended, non-tender  Skin/Integumen: No rashes, no cyanosis, no edema  Neuro: alert and oriented x1. Moving all extremities  Extremities: right foot with DP and PT pulses 2+. No signs of erythema, swelling. No point tenderness. Right foot dorsiflexion and plantar flexion     Medications        aspirin EC EC tablet 81 mg  81 mg Oral Daily     hydrochlorothiazide (HYDRODIURIL) tablet 25 mg  25 mg Oral Daily     latanoprost  1 drop Both Eyes At Bedtime     oseltamivir  30 mg Oral Daily     carbidopa-levodopa  2 tablet Oral TID     digoxin (LANOXIN) tablet 125 mcg  125 mcg Oral Once per day on Mon Tue Wed Thu Fri     diltiazem  180 mg Oral Daily     docusate sodium (COLACE) capsule 100 mg  100 mg Oral BID      "cholecalciferol  1,000 Units Oral BID       Data     Recent Labs  Lab 12/17/17  1420   WBC 6.5   HGB 12.5*   MCV 96         POTASSIUM 3.8   CHLORIDE 101   CO2 29   BUN 47*   CR 1.48*   ANIONGAP 6   DON 8.6   GLC 83   ALBUMIN 3.4   PROTTOTAL 8.2   BILITOTAL 0.7   ALKPHOS 46   ALT 10   AST 46*       Imaging:   Recent Results (from the past 24 hour(s))   XR Chest 2 Views    Narrative    CHEST TWO VIEWS   12/17/2017 2:43 PM     HISTORY: Shortness of breath.     COMPARISON: 2/8/2017.    FINDINGS: Negative chest. No interval change.      Impression    IMPRESSION: Negative.    AMELIA NEWELL MD[ES1.1]        Revision History        User Key Date/Time User Provider Type Action    > ES1.1 12/18/2017 10:06 AM Stef Pineda DO Physician Sign            ED Notes by Yumiko Shepard RN at 12/17/2017  3:24 PM     Author:  Yumiko Shepard RN Service:  (none) Author Type:  Registered Nurse    Filed:  12/17/2017  3:26 PM Date of Service:  12/17/2017  3:24 PM Creation Time:  12/17/2017  3:26 PM    Status:  Signed :  Yumiko Shepard RN (Registered Nurse)         [facility]  ED Nurse Handoff Report    ED Chief complaint: Generalized Weakness (cold, decrease of function the last couple weeks, unable to stand byself)      ED Diagnosis:   Final diagnoses:   Influenza A   Generalized muscle weakness       Code Status: Full Code    Allergies: No Known Allergies    Activity level - Baseline/Home:  Stand with Assist    Activity Level - Current:   Stand with Assist     Needed?: No    Isolation: Yes  Infection: Not Applicable  Influenza    Bariatric?: No    Vital Signs:   Vitals:    12/17/17 1242 12/17/17 1500   BP: 131/51 144/61   Resp: 14    Temp: 98.1  F (36.7  C)    TempSrc: Oral    SpO2: 99%    Weight: 61.2 kg (135 lb)    Height: 1.702 m (5' 7\")        Cardiac Rhythm: ,        Pain level:      Is this patient confused?: No    Patient Report: Initial Complaint: Increased " weakness  Focused Assessment: See ED assessment  Tests Performed: See orders  Abnormal Results: See results  Treatments provided: See orders    Family Comments: Nephew here    OBS brochure/video discussed/provided to patient: na      ED Medications:   Medications   oseltamivir (TAMIFLU) capsule 30 mg (not administered)       Drips infusing?: no      ED NURSE PHONE NUMBER: *[id number][SK1.1]            Revision History        User Key Date/Time User Provider Type Action    > SK1.1 12/17/2017  3:26 PM Yumiko Shepard RN Registered Nurse Sign                  Procedure Notes     No notes of this type exist for this encounter.         Progress Notes - Therapies (Notes from 12/17/17 through 12/20/17)      Progress Notes by Bryanna Noel PT at 12/18/2017  3:49 PM     Author:  Bryanna Noel PT Service:  (none) Author Type:  Physical Therapist    Filed:  12/18/2017  3:49 PM Date of Service:  12/18/2017  3:49 PM Creation Time:  12/18/2017  3:49 PM    Status:  Signed :  Bryanna Noel PT (Physical Therapist)            12/18/17 1338   Quick Adds   Type of Visit Initial PT Evaluation   Living Environment   Lives With spouse  ([place]issell)   Living Arrangements assisted living   Number of Stairs to Enter Home 0   Number of Stairs Within Home 0   Transportation Available family or friend will provide   Living Environment Comment Pt and his wife receive assist 3 times/week for household tasks.    Self-Care   Usual Activity Tolerance good   Current Activity Tolerance poor   Regular Exercise no   Equipment Currently Used at Home walker, rolling;wheelchair, manual   Activity/Exercise/Self-Care Comment Pt reports utilizing wheelchair for the past 3 weeks d/t R ankle pain.    Functional Level Prior   Ambulation 1-->assistive equipment   Transferring 1-->assistive equipment   Fall history within last six months no   General Information   Onset of Illness/Injury or Date of Surgery - Date 12/17/17   Referring Physician Rafael  "Juan Luis Otero,    Patient/Family Goals Statement None stated.    Pertinent History of Current Problem (include personal factors and/or comorbidities that impact the POC) 89 y/o male admitted under observation status with influenza A. PMH including Parkinson's and probable dementia.    Precautions/Limitations fall precautions  (Droplet precautions)   General Observations Pt in supine upon arrival of therapist.    General Info Comments Ambulate with assist.    Cognitive Status Examination   Orientation person;place  (\"December 2018\")   Level of Consciousness alert   Follows Commands and Answers Questions 75% of the time;unable to follow multi-step instructions   Personal Safety and Judgment impaired   Pain Assessment   Patient Currently in Pain (R ankle pain: 8/10 )   Integumentary/Edema   Integumentary/Edema Comments Noted R ankle edema.    Posture    Posture Comments Noted forward head and shoulder posture upon sitting EOB and standing at FWW.    Range of Motion (ROM)   ROM Comment B LEs WFL, limited R ankle ROM d/t pain.    Strength   Strength Comments Not formally assessed. Noted generalized B LE weakness, at least 3/5 grossly in B LEs reflected with transfers.    Bed Mobility   Bed Mobility Comments Supine-sit requiring Petra of 2.    Transfer Skills   Transfer Comments Sit <> stand with FWW and modA of 2.    Gait   Gait Comments Pt unable to ambulate.    Balance   Balance Comments Noted fair sitting balance and poor standing balance with noted posterior lean.    Sensory Examination   Sensory Perception Comments Pt denied numbness/tingling in B LEs.    General Therapy Interventions   Planned Therapy Interventions balance training;bed mobility training;gait training;ROM;strengthening;transfer training   Clinical Impression   Criteria for Skilled Therapeutic Intervention yes, treatment indicated   PT Diagnosis Difficulty with functional mobility.    Influenced by the following impairments Generalized weakness, " "Decreased activity tolerance, Pain   Functional limitations due to impairments Limited functional mobility requiring assist of 2 and mechanical lift.    Clinical Presentation Evolving/Changing   Clinical Presentation Rationale Based on PMH, current presentation, and social support.    Clinical Decision Making (Complexity) Low complexity   Therapy Frequency` 3 times/week   Predicted Duration of Therapy Intervention (days/wks) 5 days   Anticipated Discharge Disposition Transitional Care Facility   Risk & Benefits of therapy have been explained Yes   Patient, Family & other staff in agreement with plan of care Yes   [facility] AM-PAC  \"6 Clicks\" V.2 Basic Mobility Inpatient Short Form   1. Turning from your back to your side while in a flat bed without using bedrails? 3 - A Little   2. Moving from lying on your back to sitting on the side of a flat bed without using bedrails? 2 - A Lot   3. Moving to and from a bed to a chair (including a wheelchair)? 1 - Total   4. Standing up from a chair using your arms (e.g., wheelchair, or bedside chair)? 2 - A Lot   5. To walk in hospital room? 1 - Total   6. Climbing 3-5 steps with a railing? 1 - Total   Basic Mobility Raw Score (Score out of 24.Lower scores equate to lower levels of function) 10   Total Evaluation Time   Total Evaluation Time (Minutes) 15[JH1.1]        Revision History        User Key Date/Time User Provider Type Action    > JH1.1 12/18/2017  3:49 PM Bryanna Noel, PT Physical Therapist Sign            " electronic

## 2022-12-12 NOTE — LETTER
2/13/2019      Wyatt Carbajal MD  [facility] [address]  [place] [facility] [address]      RE: Loco Zafarson       Dear Colleague,    I had the pleasure of seeing Loco Hawkins in the [facility].    Service Date: 02/13/2019      REASON FOR VISIT:  Bradycardia and SVT.      HISTORY OF PRESENT ILLNESS:  Mr. Hawkins is a very pleasant 89-year-old male, a poor historian, here with his son, Chong.  I first met Mr. Hawkins when I was in inpatient service in 01/2019.  The patient has a history of paroxysmal supraventricular tachycardia that was followed up previously at the [facility].  He was last seen there in 2017.  He was maintained on 5 times weekly digoxin and 180 mg of diltiazem for that.  He was admitted in January of this year with back pain and weakness in the setting of other comorbidities and acute kidney injury.  He underwent conservative management but during that stay was noted to have sinus bradycardia into the 30s on the monitor.  We stopped his digoxin and backed off his diltiazem to 120 mg daily and recommended an outpatient follow up with Cardiology for which he is here today.  We also recommended a 14-day monitor which he is currently wearing.  Echocardiogram during that stay showed normal LV function with aortic valve sclerosis but no other major findings.      The patient has multiple other medical comorbidities that include pulmonary nodules, anemia, overactive bladder, prostate issues, stage III kidney disease, lower extremity edema which is thought to be lymphedema, hypertension, Parkinson's disease, dementia, glaucoma, constipation and GERD.  He lives in an assisted living.  His son, Chong, tells me today that they are considering hospice and they have an appointment to discuss all of that tomorrow.      Loco feels well overall from a cardiac standpoint and denies any syncope or presyncopal spells.  No  lightheadedness.  No chest pain, shortness of breath, but he is wheelchair-bound.      PHYSICAL EXAMINATION:   VITAL SIGNS:  Blood pressure is 135/58 with a pulse of 57.   GENERAL:  Alert, oriented x2, in no acute distress.  Elderly, frail-appearing gentleman in a wheelchair.     LUNGS:  Clear to auscultation bilaterally.     NECK:  Neck is stiff, but JVP appears to be around 10 cm.    ABDOMEN:  Soft, nontender.   CARDIAC:  Cardiac exam reveals normal first and second heart sound, bradycardic into the 50s, but a soft ejection systolic murmur.  No S3, S4.   EXTREMITIES:  Have 1+ bilateral edema.      PERTINENT DATA:  Creatinine is 1.4, sodium 140, potassium 4.1, hemoglobin is 10.2.  Last TSH was 9.14, but his free T4 was normal.  ECG today shows sinus bradycardia with nonspecific ST-T flattening but otherwise no major changes.  Echocardiogram from 01/2019 shows normal biventricular function and aortic valve sclerosis.      ASSESSMENT AND PLAN:  This is a 89-year-old pleasant gentleman, poor historian, who is here with his son, Chong, who has multiple comorbidities including:  Dementia, Parkinson's, hypertension, CKD and he is wheelchair-bound and has a history of paroxysmal SVT for which he was in the past maintained on diltiazem and digoxin, admitted in 01/2019 with bradycardia which led to discontinuation of his digoxin and reduction in the dose of diltiazem from 180 down to 120 mg daily.  He is currently wearing a Zio Patch monitor.  We will await results of that and follow up.  If he has any further episodes, we may have to discuss backing off on the diltiazem, but otherwise he is asymptomatic and for his age of 89 he has done well.  I will see him back as needed or in 1 year.  Otherwise, he denies any cardiac symptoms at this point.  He can continue to follow up with his PCP at his assisted living facility for his CKD, anemia and thyroid.  If the Zio indeed shows a lot of bradycardic episodes, my recommendation  will be to first discuss with his PCP to see if a low dose of thyroid supplement may help given his subclinical hypothyroidism.  I will allow his PCP to follow up on his renal function and manage his diuretics, which are given for symptomatic lower extremity edema, but lungs are clear and JVP appears to be normal at this time.      cc:   Wyatt Carbajal MD    [facility]    [address] [place] [address], [address]    [place]  [address]         LA MANZO MD             D: 2019   T: 2019   MT: REBEL      Name:     CARLOS POLK   MRN:      [id number]        Account:      [id number]   :      10/31/1921           Service Date: 2019      Document: [id number]         Outpatient Encounter Medications as of 2019   Medication Sig Dispense Refill     acetaminophen (TYLENOL) 500 MG tablet Take 2 tablets (1,000 mg) by mouth 3 times daily 930am, 3pm and 9pm 180 tablet 0     aspirin 81 MG EC tablet Take 81 mg by mouth daily 930am       bumetanide (BUMEX) 0.5 MG tablet Take 0.5 mg by mouth daily Administer with 1 mg tablet for total of 1.5 mg daily. If weight is over 164 lbs then notify nurse before administering medication.       bumetanide (BUMEX) 2 MG tablet Take 2 mg by mouth See Admin Instructions Take as directed if weight is over 164 lbs, notify nurse - one tablet for 3 days then resume 1.5 mg daily       calcitRIOL (ROCALTROL) 0.25 MCG capsule Take 0.25 mcg by mouth At Bedtime 9pm       camphor-menthol (DERMASARRA) 0.5-0.5 % external lotion Apply topically 2 times daily Apply cream to back area twice daily 930am and 9pm       carbidopa-levodopa (SINEMET)  MG per tablet Take 2 tablets by mouth 3 times daily 930am, 3pm, 9pm       clobetasol (TEMOVATE) 0.05 % external cream Apply topically 2 times daily Apply to rash on arms and back twice daily 930am and 9pm       diltiazem ER (DILT-XR) 120 MG 24 hr capsule Take 1 capsule (120 mg) by mouth daily 30 capsule 0     docusate sodium  (COLACE) 100 MG tablet Take 100 mg by mouth 2 times daily 930am and 9pm       dorzolamide-timolol (COSOPT) 2-0.5 % ophthalmic solution Place 1 drop into both eyes 2 times daily 930am and 9pm       latanoprost (XALATAN) 0.005 % ophthalmic solution Place 1 drop into both eyes At Bedtime Give 15 minutes before all other drops at 9pm       Lidocaine (LIDOCARE) 4 % Patch Place 1 patch onto the skin every 24 hours 30 patch 0     magnesium hydroxide (MILK OF MAGNESIA) 400 MG/5ML suspension Take 30 mLs by mouth every other day At bedtime every other day at 9pm       Multiple Vitamins-Minerals (PRESERVISION AREDS 2+MULTI VIT PO) Take 1 tablet by mouth At Bedtime 9pm       multivitamin, therapeutic with minerals (MULTI-VITAMIN) TABS tablet Take 1 tablet by mouth daily Cerovite Senior at 9pm       omeprazole (PRILOSEC) 20 MG DR capsule Take 20 mg by mouth 2 times daily 920am and 9m       senna-docusate (SENOKOT-S;PERICOLACE) 8.6-50 MG per tablet Take 2 tablets by mouth 2 times daily as needed for constipation 100 tablet      traMADol (ULTRAM) 50 MG tablet Take 0.5 tablets (25 mg) by mouth every 6 hours as needed for moderate pain (Patient taking differently: Take 25 mg by mouth At Bedtime ) 10 tablet 0     traMADol (ULTRAM) 50 MG tablet Take 0.5 tablets (25 mg) by mouth every 6 hours as needed for severe pain 1 tablet 0     triamcinolone acetonide 0.05 % OINT Externally apply topically 2 times daily (Patient taking differently: Externally apply topically 2 times daily Apply one applicator of ointment to both arms and back twice daily. Put on after the clobetasol cream for rash) 430 g 0     [DISCONTINUED] traMADol (ULTRAM) tablet 50 mg        No facility-administered encounter medications on file as of 2/13/2019.        Again, thank you for allowing me to participate in the care of your patient.      Sincerely,    Corwin Saavedra MD     [facility]     3

## 2023-09-02 NOTE — PHARMACY-ADMISSION MEDICATION HISTORY
Admission medication history interview status for the 1/26/2019  admission is complete. See EPIC admission navigator for prior to admission medications     Medication history source reliability:Good    Actions taken by pharmacist (provider contacted, etc): Interviewed family and [facility]     Additional medication history information not noted on PTA med list : Last doses were 930am    Medication reconciliation/reorder completed by provider prior to medication history? No    Time spent in this activity: 30 minutes    Prior to Admission medications    Medication Sig Last Dose Taking? Auth Provider   acetaminophen (TYLENOL) 500 MG tablet Take 500 mg by mouth 3 times daily 930am, 3pm and 9pm 1/26/2019 at 930am Yes Unknown, Entered By History   aspirin 81 MG EC tablet Take 81 mg by mouth daily 930am 1/26/2019 at 930am Yes Unknown, Entered By History   bumetanide (BUMEX) 0.5 MG tablet Take 0.5 mg by mouth daily Administer with 1 mg tablet for total of 1.5 mg daily. If weight is over 164 lbs then notify nurse before administering medication. 1/26/2019 at 0930 Yes Unknown, Entered By History   bumetanide (BUMEX) 1 MG tablet Take 1 mg by mouth daily Administer with the 0.5 mg for a total of 1.5 mg daily. If weight is over 164 lbs, notify nurse before administering medication. 1/26/2019 at 930am Yes Unknown, Entered By History   calcitRIOL (ROCALTROL) 0.25 MCG capsule Take 0.25 mcg by mouth At Bedtime 9pm 1/25/2019 at 9pm Yes Unknown, Entered By History   camphor-menthol (DERMASARRA) 0.5-0.5 % external lotion Apply topically 2 times daily Apply cream to back area twice daily 930am and 9pm 1/26/2019 at 930am Yes Unknown, Entered By History   carbidopa-levodopa (SINEMET)  MG per tablet Take 2 tablets by mouth 3 times daily 930am, 3pm, 9pm 1/26/2019 at 930am Yes Unknown, Entered By History   clobetasol (TEMOVATE) 0.05 % external cream Apply topically 2 times daily  Apply to rash on arms and back twice daily 930am and 9pm 1/26/2019 at 930am Yes Unknown, Entered By History   digoxin (LANOXIN) 125 MCG tablet Take 125 mcg by mouth five times a week Monday-Friday only, check pulse before giving, hold if pulse <50bpm. Notify MD if pulse is greater than 100 bpm 1/26/2019 at 930am Yes Unknown, Entered By History   diltiazem (CARTIA XT) 180 MG 24 hr capsule Take 180 mg by mouth daily 1/26/2019 at 930am Yes Unknown, Entered By History   docusate sodium (COLACE) 100 MG tablet Take 100 mg by mouth 2 times daily 930am and 9pm 1/26/2019 at 930am Yes Unknown, Entered By History   dorzolamide-timolol (COSOPT) 2-0.5 % ophthalmic solution Place 1 drop into both eyes 2 times daily 930am and 9pm 1/26/2019 at 930am Yes Unknown, Entered By History   latanoprost (XALATAN) 0.005 % ophthalmic solution Place 1 drop into both eyes At Bedtime Give 15 minutes before all other drops at 9pm 1/25/2019 at 9pm Yes Unknown, Entered By History   magnesium hydroxide (MILK OF MAGNESIA) 400 MG/5ML suspension Take 30 mLs by mouth every other day At bedtime every other day at 9pm 1/25/2019 at 9pm Yes Unknown, Entered By History   Multiple Vitamins-Minerals (PRESERVISION AREDS 2+MULTI VIT PO) Take 1 tablet by mouth At Bedtime 9pm 1/25/2019 at 9pm Yes Unknown, Entered By History   multivitamin, therapeutic with minerals (MULTI-VITAMIN) TABS tablet Take 1 tablet by mouth daily Cerovite Senior at 9pm 1/25/2019 at 9pm Yes Unknown, Entered By History   omeprazole (PRILOSEC) 20 MG DR capsule Take 20 mg by mouth 2 times daily 920am and 9m 1/26/2019 at 920am Yes Unknown, Entered By History   senna-docusate (SENOKOT-S;PERICOLACE) 8.6-50 MG per tablet Take 2 tablets by mouth 2 times daily as needed for constipation 1/26/2019 at 930am Yes Stef Pineda,    triamcinolone acetonide 0.05 % OINT Externally apply topically 2 times daily  Patient taking differently: Externally apply topically 2 times daily Apply one  applicator of ointment to both arms and back twice daily. Put on after the clobetasol cream for rash 1/26/2019 at 930am Yes Declan Robles MD   bumetanide (BUMEX) 2 MG tablet Take 2 mg by mouth See Admin Instructions Take as directed if weight is over 164 lbs, notify nurse - one tablet for 3 days then resume 1.5 mg daily Unknown at Unknown time  Unknown, Entered By History      Luma Reyes, PharmD   [phone]     Oriented - self; Oriented - place; Oriented - time